# Patient Record
Sex: FEMALE | Race: WHITE | NOT HISPANIC OR LATINO | Employment: OTHER | URBAN - METROPOLITAN AREA
[De-identification: names, ages, dates, MRNs, and addresses within clinical notes are randomized per-mention and may not be internally consistent; named-entity substitution may affect disease eponyms.]

---

## 2018-12-17 ENCOUNTER — OFFICE VISIT (OUTPATIENT)
Dept: FAMILY MEDICINE | Facility: CLINIC | Age: 78
End: 2018-12-17
Payer: MEDICARE

## 2018-12-17 VITALS
DIASTOLIC BLOOD PRESSURE: 74 MMHG | HEART RATE: 74 BPM | BODY MASS INDEX: 23.16 KG/M2 | OXYGEN SATURATION: 99 % | TEMPERATURE: 99 F | WEIGHT: 118 LBS | SYSTOLIC BLOOD PRESSURE: 143 MMHG | HEIGHT: 60 IN

## 2018-12-17 DIAGNOSIS — E11.9 DIABETES MELLITUS WITHOUT COMPLICATION: Primary | ICD-10-CM

## 2018-12-17 DIAGNOSIS — E03.9 HYPOTHYROIDISM (ACQUIRED): ICD-10-CM

## 2018-12-17 DIAGNOSIS — F32.A DEPRESSION, UNSPECIFIED DEPRESSION TYPE: ICD-10-CM

## 2018-12-17 PROCEDURE — 99214 OFFICE O/P EST MOD 30 MIN: CPT | Mod: S$GLB,,, | Performed by: NURSE PRACTITIONER

## 2018-12-17 RX ORDER — METFORMIN HYDROCHLORIDE 500 MG/1
500 TABLET ORAL 2 TIMES DAILY WITH MEALS
COMMUNITY
End: 2018-12-17

## 2018-12-17 RX ORDER — ALPRAZOLAM 0.5 MG/1
0.5 TABLET ORAL 3 TIMES DAILY
COMMUNITY
End: 2018-12-17 | Stop reason: SDUPTHER

## 2018-12-17 RX ORDER — ESTRADIOL 0.1 MG/G
1 CREAM VAGINAL DAILY
Qty: 46 G | Refills: 3 | Status: SHIPPED | OUTPATIENT
Start: 2018-12-17 | End: 2019-03-18 | Stop reason: SDUPTHER

## 2018-12-17 RX ORDER — INSULIN GLARGINE 100 [IU]/ML
10 INJECTION, SOLUTION SUBCUTANEOUS NIGHTLY
Qty: 3 SYRINGE | Refills: 5 | Status: SHIPPED | OUTPATIENT
Start: 2018-12-17 | End: 2019-03-22

## 2018-12-17 RX ORDER — LEVOTHYROXINE SODIUM 75 UG/1
75 TABLET ORAL DAILY
Qty: 30 TABLET | Refills: 5 | Status: SHIPPED | OUTPATIENT
Start: 2018-12-17 | End: 2019-03-19

## 2018-12-17 RX ORDER — GLIMEPIRIDE 2 MG/1
2 TABLET ORAL
COMMUNITY
End: 2018-12-17

## 2018-12-17 RX ORDER — ESTRADIOL 0.1 MG/G
CREAM VAGINAL DAILY
COMMUNITY
End: 2018-12-17 | Stop reason: SDUPTHER

## 2018-12-17 RX ORDER — IMIPRAMINE HYDROCHLORIDE 50 MG/1
50 TABLET, FILM COATED ORAL NIGHTLY
Qty: 30 TABLET | Refills: 5 | Status: SHIPPED | OUTPATIENT
Start: 2018-12-17 | End: 2019-03-18 | Stop reason: SDUPTHER

## 2018-12-17 RX ORDER — IMIPRAMINE HYDROCHLORIDE 50 MG/1
50 TABLET, FILM COATED ORAL NIGHTLY
COMMUNITY
End: 2018-12-17 | Stop reason: SDUPTHER

## 2018-12-17 RX ORDER — ASPIRIN 325 MG
325 TABLET ORAL DAILY
COMMUNITY
End: 2020-11-13

## 2018-12-17 RX ORDER — ATENOLOL 50 MG/1
50 TABLET ORAL DAILY
COMMUNITY
End: 2018-12-17 | Stop reason: SDUPTHER

## 2018-12-17 RX ORDER — ATENOLOL 50 MG/1
50 TABLET ORAL DAILY
Qty: 30 TABLET | Refills: 5 | Status: SHIPPED | OUTPATIENT
Start: 2018-12-17 | End: 2019-03-18

## 2018-12-17 RX ORDER — ALPRAZOLAM 0.5 MG/1
0.5 TABLET ORAL 3 TIMES DAILY
Qty: 90 TABLET | Refills: 2 | Status: SHIPPED | OUTPATIENT
Start: 2018-12-17 | End: 2019-03-18 | Stop reason: SDUPTHER

## 2018-12-17 RX ORDER — LEVOTHYROXINE SODIUM 75 UG/1
75 TABLET ORAL DAILY
COMMUNITY
End: 2018-12-17 | Stop reason: SDUPTHER

## 2018-12-17 NOTE — PROGRESS NOTES
Subjective:       Patient ID: Maliha Jaramillo is a 77 y.o. female.    Chief Complaint: Medication Problem (pt c/o severe symptoms after medication change from Januvia to Glimepiride); Joint Swelling; and Diabetes (blood sugar 271 at 1:54 p.m.)    76 y/o female known to me presents for refills. She was seen by Cardiology for near syncopal events, it appears she may just be orthostatic.  She takes 1/2 atenolol in the morning and the other 1/2 in the afternoon if needed and reports feeling better. She will follow up with Dr. Valdivia in May.  She looks good today. She reports having Hgb A1c 2 weeks ago with a result of 8%. She is intolerant to a lot of medications other than Januvia yet the cost of the medication is too high thus will try Lantus. She has been on xanax for 20 years for anxiety, she reports anxiety still controlled with current regimen.        Review of Systems   Constitutional: Negative for chills, diaphoresis, fever and unexpected weight change.   HENT: Negative for dental problem and trouble swallowing.    Eyes: Negative for visual disturbance.   Respiratory: Negative for shortness of breath and wheezing.    Cardiovascular: Negative for chest pain and palpitations.   Gastrointestinal: Negative for abdominal pain, constipation, diarrhea, nausea and vomiting.   Endocrine: Negative for polydipsia and polyuria.   Genitourinary: Negative for dysuria.   Musculoskeletal: Negative for joint swelling.   Skin: Negative for rash.   Neurological: Negative for syncope and headaches.   Psychiatric/Behavioral: Negative for agitation and confusion.       Objective:      Physical Exam   Constitutional: She is oriented to person, place, and time. She appears well-developed and well-nourished.   HENT:   Head: Normocephalic.   Eyes: Pupils are equal, round, and reactive to light.   Neck: Normal range of motion. Neck supple.   Cardiovascular: Normal rate, regular rhythm and normal heart sounds.   Pulmonary/Chest: Effort  normal and breath sounds normal.   Abdominal: Soft. Bowel sounds are normal.   Musculoskeletal: Normal range of motion.   Neurological: She is alert and oriented to person, place, and time.   Skin: Skin is warm and dry. Capillary refill takes less than 2 seconds.   Psychiatric: She has a normal mood and affect. Judgment and thought content normal.   Vitals reviewed.      Assessment:       1. Diabetes mellitus without complication    2. Depression, unspecified depression type        Plan:       1- xanax filled for 3 months  2- all other medications refilled for 30 days with refills.  3- RTC in 3 months.  4- [facility] for records from Dr. Tellez office signed.  5- start lantus at 10 units every night

## 2018-12-20 ENCOUNTER — NURSE TRIAGE (OUTPATIENT)
Dept: ADMINISTRATIVE | Facility: CLINIC | Age: 78
End: 2018-12-20

## 2018-12-20 NOTE — TELEPHONE ENCOUNTER
Reason for Disposition   Systolic BP < 90 and NOT dizzy, lightheaded or weak    Protocols used: ST LOW BLOOD PRESSURE-A-OH    Pt and Son calling with concerns of low blood pressure reading of 92/79 and Heart Rate of 99.  Pt states as long as she is lying down, no dizziness or lightheadedness.  Blood glucose was 275 and now 153.  Care advice given.

## 2019-01-13 ENCOUNTER — PATIENT MESSAGE (OUTPATIENT)
Dept: FAMILY MEDICINE | Facility: CLINIC | Age: 79
End: 2019-01-13

## 2019-01-14 RX ORDER — AZITHROMYCIN 250 MG/1
TABLET, FILM COATED ORAL
Qty: 6 TABLET | Refills: 0 | Status: SHIPPED | OUTPATIENT
Start: 2019-01-14 | End: 2019-01-19

## 2019-01-16 ENCOUNTER — PATIENT MESSAGE (OUTPATIENT)
Dept: FAMILY MEDICINE | Facility: CLINIC | Age: 79
End: 2019-01-16

## 2019-02-22 ENCOUNTER — TELEPHONE (OUTPATIENT)
Dept: FAMILY MEDICINE | Facility: CLINIC | Age: 79
End: 2019-02-22

## 2019-02-22 NOTE — TELEPHONE ENCOUNTER
Pt scheduled with NP on Monday.      ----- Message from Sussy Fontanez sent at 2/22/2019  3:51 PM CST -----  Contact: patient  Type:  Sooner Apoointment Request    Caller is requesting a sooner appointment.  Caller declined first available appointment listed below.  Caller will not accept being placed on the waitlist and is requesting a message be sent to doctor.    Name of Caller:  patient  When is the first available appointment?  03/12/19  Symptoms:  Mucus cough  Best Call Back Number:  [phone]  Additional Information:  Requesting a call back would like be seen on 02/25/19,patient stated that she just got over pneumonia still have a wheezing cough

## 2019-02-25 ENCOUNTER — OFFICE VISIT (OUTPATIENT)
Dept: FAMILY MEDICINE | Facility: CLINIC | Age: 79
End: 2019-02-25
Payer: MEDICARE

## 2019-02-25 VITALS
WEIGHT: 111.75 LBS | SYSTOLIC BLOOD PRESSURE: 158 MMHG | HEIGHT: 60 IN | HEART RATE: 76 BPM | DIASTOLIC BLOOD PRESSURE: 81 MMHG | RESPIRATION RATE: 18 BRPM | BODY MASS INDEX: 21.94 KG/M2 | OXYGEN SATURATION: 99 % | TEMPERATURE: 98 F

## 2019-02-25 DIAGNOSIS — J18.9 PNEUMONIA OF RIGHT LOWER LOBE DUE TO INFECTIOUS ORGANISM: Primary | ICD-10-CM

## 2019-02-25 DIAGNOSIS — Z12.31 ENCOUNTER FOR SCREENING MAMMOGRAM FOR MALIGNANT NEOPLASM OF BREAST: ICD-10-CM

## 2019-02-25 PROCEDURE — 99213 PR OFFICE/OUTPT VISIT, EST, LEVL III, 20-29 MIN: ICD-10-PCS | Mod: S$GLB,,, | Performed by: NURSE PRACTITIONER

## 2019-02-25 PROCEDURE — 99213 OFFICE O/P EST LOW 20 MIN: CPT | Mod: S$GLB,,, | Performed by: NURSE PRACTITIONER

## 2019-02-25 RX ORDER — DOXYCYCLINE HYCLATE 100 MG
100 TABLET ORAL EVERY 12 HOURS
Qty: 20 TABLET | Refills: 0 | Status: SHIPPED | OUTPATIENT
Start: 2019-02-25 | End: 2019-03-07

## 2019-02-26 NOTE — PROGRESS NOTES
"Chief Complaint  Chief Complaint   Patient presents with    Chest Congestion    Cough       HPI:  Maliha Jaramillo is a 78 y.o. female with medical diagnoses as listed and reviewed within the medical history and problem list that presents for complaints of cough and chest congestion. She denies fever or chills. She denies SOB or chest pain. She states, "I just feel like everything is more work than it needs to be and making me exhausted". Pt is reporting increased fatigue.     PAST MEDICAL HISTORY:  Past Medical History:   Diagnosis Date    Anxiety     Arthritis     Hyperlipidemia     Thyroid disease        PAST SURGICAL HISTORY:  Past Surgical History:   Procedure Laterality Date    APPENDECTOMY      HYSTERECTOMY      TONSILLECTOMY         SOCIAL HISTORY:  Social History     Socioeconomic History    Marital status:      Spouse name: Not on file    Number of children: Not on file    Years of education: Not on file    Highest education level: Not on file   Social Needs    Financial resource strain: Not on file    Food insecurity - worry: Not on file    Food insecurity - inability: Not on file    Transportation needs - medical: Not on file    Transportation needs - non-medical: Not on file   Occupational History    Not on file   Tobacco Use    Smoking status: Former Smoker    Smokeless tobacco: Never Used   Substance and Sexual Activity    Alcohol use: Yes    Drug use: No    Sexual activity: No   Other Topics Concern    Not on file   Social History Narrative    Not on file       FAMILY HISTORY:  History reviewed. No pertinent family history.    ALLERGIES AND MEDICATIONS: updated and reviewed.  Review of patient's allergies indicates:   Allergen Reactions    Ciprofloxacin Rash     Current Outpatient Medications   Medication Sig Dispense Refill    ALPRAZolam (XANAX) 0.5 MG tablet Take 1 tablet (0.5 mg total) by mouth 3 (three) times daily. 90 tablet 2    aspirin 325 MG tablet Take " 325 mg by mouth once daily.      atenolol (TENORMIN) 50 MG tablet Take 1 tablet (50 mg total) by mouth once daily. 30 tablet 5    doxycycline (VIBRA-TABS) 100 MG tablet Take 1 tablet (100 mg total) by mouth every 12 (twelve) hours. for 10 days 20 tablet 0    estradiol (ESTRACE) 0.01 % (0.1 mg/gram) vaginal cream Place 1 g vaginally once daily. 46 g 3    imipramine (TOFRANIL) 50 MG tablet Take 1 tablet (50 mg total) by mouth every evening. 30 tablet 5    insulin (LANTUS SOLOSTAR U-100 INSULIN) glargine 100 units/mL (3mL) SubQ pen Inject 10 Units into the skin every evening. 3 Syringe 5    levothyroxine (SYNTHROID) 75 MCG tablet Take 1 tablet (75 mcg total) by mouth once daily. 30 tablet 5    SITagliptin (JANUVIA) 100 MG Tab Take 100 mg by mouth once daily.       No current facility-administered medications for this visit.          ROS  Review of Systems   Constitutional: Positive for fatigue. Negative for appetite change, chills and fever.   HENT: Positive for congestion, rhinorrhea and sore throat. Negative for postnasal drip.    Respiratory: Positive for cough and wheezing. Negative for chest tightness and shortness of breath.    Cardiovascular: Negative for chest pain and palpitations.   Gastrointestinal: Negative for abdominal distention, abdominal pain, constipation, diarrhea, nausea and vomiting.   Genitourinary: Negative for dysuria.   Musculoskeletal: Negative for myalgias.   Skin: Negative for color change and rash.   Neurological: Positive for weakness. Negative for dizziness and headaches.   Psychiatric/Behavioral: Negative for confusion and sleep disturbance. The patient is not nervous/anxious.            PHYSICAL EXAM  Vitals:    02/25/19 1503   BP: (!) 158/81   BP Location: Right arm   Patient Position: Sitting   Pulse: 76   Resp: 18   Temp: 97.9 °F (36.6 °C)   SpO2: 99%   Weight: 50.7 kg (111 lb 12.4 oz)   Height: 5' (1.524 m)    Body mass index is 21.83 kg/m².  Weight: 50.7 kg (111 lb 12.4 oz)    Height: 5' (152.4 cm)       Physical Exam   Constitutional: She is oriented to person, place, and time. She appears well-developed and well-nourished.   HENT:   Head: Normocephalic.   Right Ear: No drainage, swelling or tenderness. Tympanic membrane is not erythematous and not bulging.   Left Ear: No drainage, swelling or tenderness. Tympanic membrane is not erythematous and not bulging.   Nose: Rhinorrhea present. No mucosal edema. Right sinus exhibits no maxillary sinus tenderness and no frontal sinus tenderness. Left sinus exhibits no maxillary sinus tenderness and no frontal sinus tenderness.   Mouth/Throat: Mucous membranes are normal. No oropharyngeal exudate, posterior oropharyngeal edema or posterior oropharyngeal erythema.   Eyes: Pupils are equal, round, and reactive to light.   Neck: Normal range of motion. Neck supple.   Cardiovascular: Normal rate, regular rhythm, S1 normal and S2 normal.   No murmur heard.  Pulmonary/Chest: Effort normal. No respiratory distress. She has no wheezes. She has rhonchi in the right lower field.   Abdominal: Soft. Normal appearance and bowel sounds are normal. She exhibits no distension. There is no tenderness.   Musculoskeletal: Normal range of motion.   Pt walks with a walker.    Neurological: She is alert and oriented to person, place, and time.   Skin: Skin is warm and dry. Capillary refill takes less than 2 seconds.   Psychiatric: She has a normal mood and affect. Her speech is normal and behavior is normal. Thought content normal. Cognition and memory are normal.   Vitals reviewed.        Health Maintenance       Date Due Completion Date    Lipid Panel 1938 ---    Hemoglobin A1c 1938 ---    Foot Exam 12/27/1948 ---    Eye Exam 12/27/1948 ---    Urine Microalbumin 12/27/1948 ---    TETANUS VACCINE 12/27/1956 ---    DEXA SCAN 12/27/1978 ---    Zoster Vaccine 12/27/1998 ---    Pneumococcal Vaccine (65+ Low/Medium Risk) (1 of 2 - PCV13) 12/27/2003 ---     Influenza Vaccine 08/01/2018 ---               Assessment & Plan    [place] was seen today for chest congestion and cough.    Diagnoses and all orders for this visit:    Pneumonia of right lower lobe due to infectious organism  -     doxycycline (VIBRA-TABS) 100 MG tablet; Take 1 tablet (100 mg total) by mouth every 12 (twelve) hours. for 10 days    Encounter for screening mammogram for malignant neoplasm of breast   -     Mammo Digital Screening Bilateral With CAD; Future    - Increase PO fluid intake.  - Tylenol and Motrin as needed for fever or body aches.   -     Follow-up: Follow-up in about 4 weeks (around 3/25/2019).      Risks, benefits, and side effects were discussed with the patient. All questions were answered to the fullest satisfaction of the patient, and pt verbalized understanding and agreement to treatment plan. Pt was to call with any new or worsening symptoms, or present to the ER.

## 2019-03-08 ENCOUNTER — TELEPHONE (OUTPATIENT)
Dept: FAMILY MEDICINE | Facility: CLINIC | Age: 79
End: 2019-03-08

## 2019-03-08 NOTE — TELEPHONE ENCOUNTER
Attempted to return call to pt, no answer, left message to call office.          ----- Message from Klarissa May sent at 3/8/2019  1:09 PM CST -----  Contact: self  Type:  Patient Returning Call    Who Called:  Patient   Who Left Message for Patient:nurse   Does the patient know what this is regarding?:    Best Call Back Number:[phone] (home)     Additional Information:

## 2019-03-11 ENCOUNTER — HOSPITAL ENCOUNTER (OUTPATIENT)
Dept: RADIOLOGY | Facility: HOSPITAL | Age: 79
Discharge: HOME | End: 2019-03-11
Attending: NURSE PRACTITIONER
Payer: MEDICARE

## 2019-03-11 VITALS — BODY MASS INDEX: 21.4 KG/M2 | HEIGHT: 60 IN | WEIGHT: 109 LBS

## 2019-03-11 DIAGNOSIS — Z12.31 ENCOUNTER FOR SCREENING MAMMOGRAM FOR MALIGNANT NEOPLASM OF BREAST: ICD-10-CM

## 2019-03-11 PROCEDURE — 77067 SCR MAMMO BI INCL CAD: CPT | Mod: TC

## 2019-03-11 PROCEDURE — 77067 MAMMO DIGITAL SCREENING BILAT WITH CAD: ICD-10-PCS | Mod: 26,,, | Performed by: RADIOLOGY

## 2019-03-11 PROCEDURE — 77067 SCR MAMMO BI INCL CAD: CPT | Mod: 26,,, | Performed by: RADIOLOGY

## 2019-03-18 ENCOUNTER — LAB VISIT (OUTPATIENT)
Dept: LAB | Facility: HOSPITAL | Age: 79
End: 2019-03-18
Attending: NURSE PRACTITIONER
Payer: MEDICARE

## 2019-03-18 ENCOUNTER — OFFICE VISIT (OUTPATIENT)
Dept: FAMILY MEDICINE | Facility: CLINIC | Age: 79
End: 2019-03-18
Payer: MEDICARE

## 2019-03-18 VITALS
HEART RATE: 74 BPM | BODY MASS INDEX: 22.38 KG/M2 | DIASTOLIC BLOOD PRESSURE: 76 MMHG | HEIGHT: 60 IN | WEIGHT: 114 LBS | TEMPERATURE: 98 F | SYSTOLIC BLOOD PRESSURE: 155 MMHG

## 2019-03-18 DIAGNOSIS — R53.1 WEAKNESS: ICD-10-CM

## 2019-03-18 DIAGNOSIS — E11.9 DIABETES MELLITUS WITHOUT COMPLICATION: ICD-10-CM

## 2019-03-18 DIAGNOSIS — Z79.899 HIGH RISK MEDICATION USE: ICD-10-CM

## 2019-03-18 DIAGNOSIS — F41.9 ANXIETY: ICD-10-CM

## 2019-03-18 DIAGNOSIS — I10 HYPERTENSION, UNSPECIFIED TYPE: ICD-10-CM

## 2019-03-18 DIAGNOSIS — E03.9 HYPOTHYROIDISM (ACQUIRED): Primary | ICD-10-CM

## 2019-03-18 DIAGNOSIS — E03.9 HYPOTHYROIDISM (ACQUIRED): ICD-10-CM

## 2019-03-18 LAB
ALBUMIN SERPL BCP-MCNC: 3.6 G/DL
ALP SERPL-CCNC: 70 U/L
ALT SERPL W/O P-5'-P-CCNC: 27 U/L
ANION GAP SERPL CALC-SCNC: 12 MMOL/L
AST SERPL-CCNC: 41 U/L
BASOPHILS # BLD AUTO: 0.09 K/UL
BASOPHILS NFR BLD: 1 %
BILIRUB SERPL-MCNC: 0.4 MG/DL
BUN SERPL-MCNC: 18 MG/DL
CALCIUM SERPL-MCNC: 8.8 MG/DL
CHLORIDE SERPL-SCNC: 102 MMOL/L
CHOLEST SERPL-MCNC: 216 MG/DL
CHOLEST/HDLC SERPL: 6.2 {RATIO}
CO2 SERPL-SCNC: 23 MMOL/L
CREAT SERPL-MCNC: 1.2 MG/DL
DIFFERENTIAL METHOD: ABNORMAL
EOSINOPHIL # BLD AUTO: 0.6 K/UL
EOSINOPHIL NFR BLD: 5.9 %
ERYTHROCYTE [DISTWIDTH] IN BLOOD BY AUTOMATED COUNT: 12.3 %
EST. GFR  (AFRICAN AMERICAN): 49.3 ML/MIN/1.73 M^2
EST. GFR  (NON AFRICAN AMERICAN): 42.8 ML/MIN/1.73 M^2
ESTIMATED AVG GLUCOSE: 169 MG/DL
GLUCOSE SERPL-MCNC: 150 MG/DL
HBA1C MFR BLD HPLC: 7.5 %
HCT VFR BLD AUTO: 32.8 %
HDLC SERPL-MCNC: 35 MG/DL
HDLC SERPL: 16.2 %
HGB BLD-MCNC: 10.8 G/DL
IMM GRANULOCYTES # BLD AUTO: 0.03 K/UL
IMM GRANULOCYTES NFR BLD AUTO: 0.3 %
LDLC SERPL CALC-MCNC: 140.8 MG/DL
LYMPHOCYTES # BLD AUTO: 4.6 K/UL
LYMPHOCYTES NFR BLD: 49.6 %
MCH RBC QN AUTO: 32 PG
MCHC RBC AUTO-ENTMCNC: 32.9 G/DL
MCV RBC AUTO: 97 FL
MONOCYTES # BLD AUTO: 0.7 K/UL
MONOCYTES NFR BLD: 7.3 %
NEUTROPHILS # BLD AUTO: 3.3 K/UL
NEUTROPHILS NFR BLD: 35.9 %
NONHDLC SERPL-MCNC: 181 MG/DL
NRBC BLD-RTO: 0 /100 WBC
PLATELET # BLD AUTO: 255 K/UL
PMV BLD AUTO: 10.1 FL
POTASSIUM SERPL-SCNC: 4 MMOL/L
PROT SERPL-MCNC: 7.2 G/DL
RBC # BLD AUTO: 3.38 M/UL
SODIUM SERPL-SCNC: 137 MMOL/L
T4 FREE SERPL-MCNC: 1.27 NG/DL
TRIGL SERPL-MCNC: 201 MG/DL
TSH SERPL DL<=0.005 MIU/L-ACNC: 0.22 UIU/ML
WBC # BLD AUTO: 9.28 K/UL

## 2019-03-18 PROCEDURE — 80061 LIPID PANEL: CPT

## 2019-03-18 PROCEDURE — 36415 COLL VENOUS BLD VENIPUNCTURE: CPT

## 2019-03-18 PROCEDURE — 99214 PR OFFICE/OUTPT VISIT, EST, LEVL IV, 30-39 MIN: ICD-10-PCS | Mod: S$GLB,,, | Performed by: NURSE PRACTITIONER

## 2019-03-18 PROCEDURE — 84439 ASSAY OF FREE THYROXINE: CPT

## 2019-03-18 PROCEDURE — 84443 ASSAY THYROID STIM HORMONE: CPT

## 2019-03-18 PROCEDURE — 99214 OFFICE O/P EST MOD 30 MIN: CPT | Mod: S$GLB,,, | Performed by: NURSE PRACTITIONER

## 2019-03-18 PROCEDURE — 83036 HEMOGLOBIN GLYCOSYLATED A1C: CPT

## 2019-03-18 PROCEDURE — 85025 COMPLETE CBC W/AUTO DIFF WBC: CPT

## 2019-03-18 PROCEDURE — 80053 COMPREHEN METABOLIC PANEL: CPT

## 2019-03-18 RX ORDER — ESTRADIOL 0.1 MG/G
1 CREAM VAGINAL DAILY
Qty: 46 G | Refills: 3 | Status: SHIPPED | OUTPATIENT
Start: 2019-03-18 | End: 2019-11-26

## 2019-03-18 RX ORDER — IMIPRAMINE HYDROCHLORIDE 50 MG/1
50 TABLET, FILM COATED ORAL NIGHTLY
Qty: 30 TABLET | Refills: 5 | Status: SHIPPED | OUTPATIENT
Start: 2019-03-18 | End: 2019-07-03 | Stop reason: SDUPTHER

## 2019-03-18 RX ORDER — ATENOLOL 25 MG/1
25 TABLET ORAL DAILY
Qty: 180 TABLET | Refills: 1 | Status: SHIPPED | OUTPATIENT
Start: 2019-03-18 | End: 2020-03-17

## 2019-03-18 RX ORDER — ALPRAZOLAM 0.5 MG/1
0.5 TABLET ORAL 2 TIMES DAILY PRN
Qty: 60 TABLET | Refills: 2 | Status: SHIPPED | OUTPATIENT
Start: 2019-03-18 | End: 2019-07-03 | Stop reason: SDUPTHER

## 2019-03-18 NOTE — PROGRESS NOTES
Subjective:       Patient ID: Maliha Jaramillo is a 78 y.o. female.    Chief Complaint: Follow-up    77 y/o female with PMH: DM, HTN and hypothyroidism presents for 3 mo follow up.  She reports doing essentially well. She has a taking Januvia paying out of pocket $300.00 or more, intolerant to metformin started on Lantus 12/2018 d/t cost and Hgb A1c > 8.5%, januvia stopped. She is under the care of Dr. Valdivia orthostatic hypotension, significantly improved from 2 years ago with next appt 5/7/19. Although she does express recurrent chest heaviness, near syncopal and period of weakness.  She is somewhat frail walking with a rollator. She lives with her son reporting she stopped driving ot driving 6 months and has accepted it.  She continues to maciel with anxiety reporting essentially controlled with 0.5 mg xanax taken twice daily on most days. It was discussed it is not recommend to take xanax to sleep, it is intended for PRN Anxiety. She is due for labs.     Review of Systems   Constitutional: Negative for chills, diaphoresis, fever and unexpected weight change.   HENT: Negative for dental problem and trouble swallowing.    Eyes: Negative for visual disturbance.   Respiratory: Positive for chest tightness. Negative for shortness of breath and wheezing.         Chest heaviness    Cardiovascular: Negative for chest pain and palpitations.   Gastrointestinal: Negative for abdominal pain, constipation, diarrhea, nausea and vomiting.   Endocrine: Negative for polydipsia and polyuria.   Genitourinary: Negative for dysuria.   Musculoskeletal: Negative for joint swelling.   Skin: Negative for rash.   Neurological: Positive for weakness and light-headedness. Negative for syncope and headaches.        Near syncope couple times a month   Psychiatric/Behavioral: Negative for agitation and confusion.       Objective:      Physical Exam   Constitutional: She is oriented to person, place, and time. She appears well-developed and  well-nourished.   HENT:   Head: Normocephalic.   Eyes: Conjunctivae are normal. Pupils are equal, round, and reactive to light.   Neck: Normal range of motion. Neck supple. No thyromegaly present.   Cardiovascular: Normal rate, regular rhythm and normal heart sounds.   No murmur heard.  Pulmonary/Chest: Effort normal and breath sounds normal. She has no wheezes. She has no rales.   Abdominal: Soft. Bowel sounds are normal. She exhibits no distension. There is no tenderness.   Musculoskeletal: Normal range of motion. She exhibits no edema.   Neurological: She is alert and oriented to person, place, and time.   Skin: Skin is warm and dry. Capillary refill takes less than 2 seconds. No rash noted.   Psychiatric: She has a normal mood and affect. Judgment and thought content normal.   Vitals reviewed.      Assessment:       1. Hypothyroidism (acquired)    2. Diabetes mellitus without complication    3. Weakness    4. Hypertension, unspecified type    5. High risk medication use    6. Anxiety        Plan:       1- fasting labs today  2- xanax #60 printed with 3RF  3- refills to be sent after labs result.  4- note and labs to be faxed to Dr. Valdivia  5- encouraged to call Dr. Valdivia for sooner appt and go to ER as needed.

## 2019-03-19 ENCOUNTER — TELEPHONE (OUTPATIENT)
Dept: FAMILY MEDICINE | Facility: CLINIC | Age: 79
End: 2019-03-19

## 2019-03-19 DIAGNOSIS — E11.69 HYPERLIPIDEMIA ASSOCIATED WITH TYPE 2 DIABETES MELLITUS: Primary | ICD-10-CM

## 2019-03-19 DIAGNOSIS — E78.5 HYPERLIPIDEMIA ASSOCIATED WITH TYPE 2 DIABETES MELLITUS: Primary | ICD-10-CM

## 2019-03-19 DIAGNOSIS — E03.9 HYPOTHYROIDISM (ACQUIRED): ICD-10-CM

## 2019-03-19 RX ORDER — PRAVASTATIN SODIUM 10 MG/1
10 TABLET ORAL DAILY
Qty: 90 TABLET | Refills: 0 | Status: SHIPPED | OUTPATIENT
Start: 2019-03-19 | End: 2019-06-30 | Stop reason: SDUPTHER

## 2019-03-19 RX ORDER — LEVOTHYROXINE SODIUM 50 UG/1
50 TABLET ORAL DAILY
Qty: 90 TABLET | Refills: 0 | Status: SHIPPED | OUTPATIENT
Start: 2019-03-19 | End: 2019-07-09

## 2019-03-22 ENCOUNTER — TELEPHONE (OUTPATIENT)
Dept: FAMILY MEDICINE | Facility: CLINIC | Age: 79
End: 2019-03-22

## 2019-03-22 DIAGNOSIS — E11.9 DIABETES MELLITUS WITHOUT COMPLICATION: Primary | ICD-10-CM

## 2019-03-22 RX ORDER — INSULIN GLARGINE 100 [IU]/ML
15 INJECTION, SOLUTION SUBCUTANEOUS NIGHTLY
Qty: 13.5 ML | Refills: 1 | Status: SHIPPED | OUTPATIENT
Start: 2019-03-22 | End: 2019-07-03

## 2019-03-22 NOTE — TELEPHONE ENCOUNTER
Attempted to contact patient. Patient did not answer, I was unable to leave a voicemail at this time. Patient scheduled on 6/19/19 at 1320.

## 2019-03-22 NOTE — TELEPHONE ENCOUNTER
----- Message from Adela Cortez sent at 3/22/2019  7:12 AM CDT -----  Contact: Fangxinmeigisselle  Message from [facility], System Message sent at 3/20/2019  5:33 PM CDT -----    Appointment Request From: Maliha Jaramillo    With Provider: Gladis Mirza NP [[facility]]    Preferred Date Range: 6/18/2019 - 6/19/2019    Preferred Times: Tuesday Afternoon, Wednesday Afternoon    Reason for visit: 3 month follow up    Comments:  three month follow up

## 2019-03-22 NOTE — TELEPHONE ENCOUNTER
----- Message from Alberto Nice sent at 3/22/2019  8:19 AM CDT -----  Type:  Patient Returning Call    Who Called:  Patient  Who Left Message for Patient:  Yaima  Does the patient know what this is regarding?:  Test results  Best Call Back Number: [phone]   Additional Information:

## 2019-04-26 ENCOUNTER — PATIENT MESSAGE (OUTPATIENT)
Dept: FAMILY MEDICINE | Facility: CLINIC | Age: 79
End: 2019-04-26

## 2019-04-29 ENCOUNTER — TELEPHONE (OUTPATIENT)
Dept: FAMILY MEDICINE | Facility: CLINIC | Age: 79
End: 2019-04-29

## 2019-04-29 DIAGNOSIS — E11.9 DIABETES MELLITUS WITHOUT COMPLICATION: Primary | ICD-10-CM

## 2019-04-29 RX ORDER — GLIPIZIDE 10 MG/1
10 TABLET ORAL
Qty: 60 TABLET | Refills: 2 | Status: SHIPPED | OUTPATIENT
Start: 2019-04-29 | End: 2019-08-03 | Stop reason: SDUPTHER

## 2019-05-06 DIAGNOSIS — F41.9 ANXIETY: ICD-10-CM

## 2019-05-06 RX ORDER — ALPRAZOLAM 0.5 MG/1
0.5 TABLET ORAL 2 TIMES DAILY PRN
Qty: 60 TABLET | Refills: 2 | Status: CANCELLED | OUTPATIENT
Start: 2019-05-06

## 2019-05-06 NOTE — TELEPHONE ENCOUNTER
Please notify Ms. Diaz that on 3/18/19 Janine wrote for #60 and gave 2 refills. She can check with the pharmacy.

## 2019-05-07 ENCOUNTER — TELEPHONE (OUTPATIENT)
Dept: FAMILY MEDICINE | Facility: CLINIC | Age: 79
End: 2019-05-07

## 2019-05-07 NOTE — TELEPHONE ENCOUNTER
++@[phone] Spoke with pt. Informed of NP communication. Verbalized an understanding.     + @[phone] Attempted to call pt. NURYS x1

## 2019-05-07 NOTE — TELEPHONE ENCOUNTER
----- Message from Mary Lopez sent at 3/13/2019  1:35 PM CDT -----  Contact: PT Portal Request  Appointment Request From: Maliha Jaramillo    With Provider: [facility]    Preferred Date Range: 3/12/2019 - 3/15/2019    Preferred Times: Any time    Reason for visit: mammogram    Comments:  I have an order for a mammogram from [facility] for a mammogram.

## 2019-05-07 NOTE — TELEPHONE ENCOUNTER
See other encounter regarding this message.     ----- Message from Denisse Ferguson sent at 5/7/2019 10:51 AM CDT -----  Contact: [phone]  Patient is returning nurse's phone call.  Please call patient back at [phone].

## 2019-06-19 ENCOUNTER — PATIENT OUTREACH (OUTPATIENT)
Dept: ADMINISTRATIVE | Facility: HOSPITAL | Age: 79
End: 2019-06-19

## 2019-06-30 DIAGNOSIS — E78.5 HYPERLIPIDEMIA ASSOCIATED WITH TYPE 2 DIABETES MELLITUS: ICD-10-CM

## 2019-06-30 DIAGNOSIS — E11.69 HYPERLIPIDEMIA ASSOCIATED WITH TYPE 2 DIABETES MELLITUS: ICD-10-CM

## 2019-07-01 RX ORDER — PRAVASTATIN SODIUM 10 MG/1
TABLET ORAL
Qty: 90 TABLET | Refills: 1 | Status: SHIPPED | OUTPATIENT
Start: 2019-07-01 | End: 2020-01-20

## 2019-07-03 ENCOUNTER — LAB VISIT (OUTPATIENT)
Dept: LAB | Facility: HOSPITAL | Age: 79
End: 2019-07-03
Attending: NURSE PRACTITIONER
Payer: MEDICARE

## 2019-07-03 ENCOUNTER — OFFICE VISIT (OUTPATIENT)
Dept: FAMILY MEDICINE | Facility: CLINIC | Age: 79
End: 2019-07-03
Payer: MEDICARE

## 2019-07-03 VITALS
HEIGHT: 60 IN | DIASTOLIC BLOOD PRESSURE: 76 MMHG | WEIGHT: 118 LBS | TEMPERATURE: 98 F | SYSTOLIC BLOOD PRESSURE: 155 MMHG | BODY MASS INDEX: 23.16 KG/M2 | HEART RATE: 71 BPM

## 2019-07-03 DIAGNOSIS — E11.40 NEUROPATHY DUE TO TYPE 2 DIABETES MELLITUS: ICD-10-CM

## 2019-07-03 DIAGNOSIS — I10 HYPERTENSION, UNSPECIFIED TYPE: ICD-10-CM

## 2019-07-03 DIAGNOSIS — E11.9 TYPE 2 DIABETES MELLITUS WITHOUT COMPLICATION, WITHOUT LONG-TERM CURRENT USE OF INSULIN: ICD-10-CM

## 2019-07-03 DIAGNOSIS — E03.9 HYPOTHYROIDISM (ACQUIRED): ICD-10-CM

## 2019-07-03 DIAGNOSIS — Z79.899 HIGH RISK MEDICATION USE: ICD-10-CM

## 2019-07-03 DIAGNOSIS — F41.9 ANXIETY: ICD-10-CM

## 2019-07-03 DIAGNOSIS — Z11.59 ENCOUNTER FOR HEPATITIS C SCREENING TEST FOR LOW RISK PATIENT: ICD-10-CM

## 2019-07-03 DIAGNOSIS — Z78.0 ASYMPTOMATIC MENOPAUSAL STATE: ICD-10-CM

## 2019-07-03 DIAGNOSIS — F32.89 OTHER DEPRESSION: ICD-10-CM

## 2019-07-03 DIAGNOSIS — E11.9 COMPREHENSIVE DIABETIC FOOT EXAMINATION, TYPE 2 DM, ENCOUNTER FOR: ICD-10-CM

## 2019-07-03 DIAGNOSIS — Z78.0 POSTMENOPAUSAL STATE: ICD-10-CM

## 2019-07-03 DIAGNOSIS — E11.9 TYPE 2 DIABETES MELLITUS WITHOUT COMPLICATION, WITHOUT LONG-TERM CURRENT USE OF INSULIN: Primary | ICD-10-CM

## 2019-07-03 LAB
25(OH)D3+25(OH)D2 SERPL-MCNC: 30 NG/ML (ref 30–96)
ANION GAP SERPL CALC-SCNC: 9 MMOL/L (ref 8–16)
BUN SERPL-MCNC: 21 MG/DL (ref 8–23)
CALCIUM SERPL-MCNC: 8.9 MG/DL (ref 8.7–10.5)
CHLORIDE SERPL-SCNC: 104 MMOL/L (ref 95–110)
CO2 SERPL-SCNC: 24 MMOL/L (ref 23–29)
CREAT SERPL-MCNC: 1.3 MG/DL (ref 0.5–1.4)
EST. GFR  (AFRICAN AMERICAN): 44.8 ML/MIN/1.73 M^2
EST. GFR  (NON AFRICAN AMERICAN): 38.8 ML/MIN/1.73 M^2
ESTIMATED AVG GLUCOSE: 169 MG/DL (ref 68–131)
GLUCOSE SERPL-MCNC: 147 MG/DL (ref 70–110)
HBA1C MFR BLD HPLC: 7.5 % (ref 4.5–6.2)
POTASSIUM SERPL-SCNC: 3.8 MMOL/L (ref 3.5–5.1)
SODIUM SERPL-SCNC: 137 MMOL/L (ref 136–145)
T4 FREE SERPL-MCNC: 0.87 NG/DL (ref 0.71–1.51)
TSH SERPL DL<=0.005 MIU/L-ACNC: 24.85 UIU/ML (ref 0.34–5.6)

## 2019-07-03 PROCEDURE — 82306 VITAMIN D 25 HYDROXY: CPT

## 2019-07-03 PROCEDURE — 36415 COLL VENOUS BLD VENIPUNCTURE: CPT

## 2019-07-03 PROCEDURE — 84439 ASSAY OF FREE THYROXINE: CPT

## 2019-07-03 PROCEDURE — 84443 ASSAY THYROID STIM HORMONE: CPT

## 2019-07-03 PROCEDURE — 99213 PR OFFICE/OUTPT VISIT, EST, LEVL III, 20-29 MIN: ICD-10-PCS | Mod: S$GLB,,, | Performed by: NURSE PRACTITIONER

## 2019-07-03 PROCEDURE — 99213 OFFICE O/P EST LOW 20 MIN: CPT | Mod: S$GLB,,, | Performed by: NURSE PRACTITIONER

## 2019-07-03 PROCEDURE — 86803 HEPATITIS C AB TEST: CPT

## 2019-07-03 PROCEDURE — 83036 HEMOGLOBIN GLYCOSYLATED A1C: CPT

## 2019-07-03 PROCEDURE — 80048 BASIC METABOLIC PNL TOTAL CA: CPT

## 2019-07-03 RX ORDER — IMIPRAMINE HYDROCHLORIDE 50 MG/1
50 TABLET, FILM COATED ORAL NIGHTLY
Qty: 90 TABLET | Refills: 1 | Status: SHIPPED | OUTPATIENT
Start: 2019-07-03 | End: 2020-05-21

## 2019-07-03 RX ORDER — PREGABALIN 25 MG/1
25 CAPSULE ORAL 2 TIMES DAILY
Qty: 60 CAPSULE | Refills: 1 | Status: SHIPPED | OUTPATIENT
Start: 2019-07-03 | End: 2019-09-10 | Stop reason: SDUPTHER

## 2019-07-03 RX ORDER — LISINOPRIL 10 MG/1
10 TABLET ORAL DAILY
Qty: 90 TABLET | Refills: 1 | Status: SHIPPED | OUTPATIENT
Start: 2019-07-03 | End: 2019-07-03

## 2019-07-03 RX ORDER — LOSARTAN POTASSIUM 25 MG/1
25 TABLET ORAL DAILY
Qty: 90 TABLET | Refills: 1 | Status: SHIPPED | OUTPATIENT
Start: 2019-07-03 | End: 2019-11-26

## 2019-07-03 RX ORDER — ALPRAZOLAM 0.5 MG/1
0.5 TABLET ORAL 2 TIMES DAILY PRN
Qty: 60 TABLET | Refills: 2 | Status: SHIPPED | OUTPATIENT
Start: 2019-07-03 | End: 2019-10-03 | Stop reason: SDUPTHER

## 2019-07-03 NOTE — PATIENT INSTRUCTIONS
1- start losartan for DM and uncontrolled HTN  2- non fasting labs  3- dexa ordered  4- request vaccine records from obtained about 5 yrs ago from [facility] in [place],  Mary Beth lamb MD  5- start lyrica at low dose for neuropathy-NEW  6- allergies updated cough with ACE  7- Xanax printed for 1 mo 2 RF            Right Bundle Branch Block    Right bundle branch block is a problem in the hearts electrical system. Your heart uses electrical signals to keep pumping normally. Normal heartbeats are generated in the upper right heart chamber called the right atrium. The electrical signals reach the ventricles, the main heart pumping chambers. They travel over specialized wires called bundle branches. There are 2 main bundle branches with one in the right ventricle and the other in the left ventricle. In right bundle branch block, the right bundle branch fails to conduct electricity. The signals from the atria reach the ventricles only through the left bundle branch. Instead of the left and right ventricles squeezing at the same time, the right ventricle squeezes just a little later.  What causes right bundle branch block?  Right bundle branch block can result from a number of conditions, such as:  · Heart disease from high blood pressure  · Chronic obstructive lung disease (COPD)  · Pulmonary embolism  · Cardiomyopathy  · Myocarditis  · Heart attack  · Congenital heart disease  · Surgery or other procedures on the heart  In some case, the cause of right bundle branch block is not known. The heart otherwise appears normal.  What are the symptoms of right bundle branch block?  Right bundle branch block does not cause symptoms on its own. It may make symptoms of other heart conditions worse, such as heart failure.  How is right bundle branch block diagnosed?  Right bundle branch block is diagnosed with an electrocardiogram (ECG). This test looks at the hearts rhythm. The condition is often found during an ECG for  another reason. Your doctor may want to check you for other health conditions. He or she may ask about your medical history and give you a physical exam. You may also have other tests, such as:  · Echocardiogram, to look at your hearts motion and blood flow through the heart  · Testing to check the health and function of your lungs  · Blood tests to look at your overall health  How is right bundle branch block treated?  If you have no symptoms and no other heart conditions, no treatment is recommended. If you have or may have heart disease, your healthcare provider will want to keep track of your heart health. In particular, if you develop right bundle branch block after a heart attack, it increases the risk of death and therefore requires more intensive treatment.  Living with right bundle branch block  Your doctor may give you more instructions about how to manage your overall heart health. You might need to make lifestyle changes. These may include losing weight, quitting smoking, or improving your diet.  Keep track of any heart symptoms you have. See your doctor regularly, even if you dont have any symptoms. Make sure all your healthcare providers know about your right bundle branch block.     When should I call my healthcare provider?  Call your healthcare provider right away if you have any of these:  · New symptoms  · Chest pain  · Fainting  · Shortness of breath   Date Last Reviewed: 5/1/2016  © 6774-3829 [employer]. [address] [place] [address], [place] [address]. All rights reserved. This information is not intended as a substitute for professional medical care. Always follow your healthcare professional's instructions.

## 2019-07-03 NOTE — PROGRESS NOTES
Subjective:       Patient ID: Maliha Jaramillo is a 78 y.o. female.    Chief Complaint: Follow-up (3 mo f/u ) and Rash  presents for routine follow up, son is present reports pt with back pain in bed 4 of 7 days last week.She presents with an EKG with incomplete RBBB requesting education thus provided, denies CP or SOB.   Taking Glipizide 3 mo tolerating well. Loves [employer] yet cost was always an issue while in the [facility] hole has paid up to $600.00 monthly.Screening discussed with pneumovac and shingle vaccines obtained about 5 yrs ago from [facility] in [place],  Mary Beth lamb MD. DM type II with Eye exam- due-Dr. Barbosa recommended. Neuropathy using OTC gtt (unknown name/brand) good pain relief when used but not consistent with using.  Requesting signature while at the  base shopping thus assistance paper signed. She is no longer driving.   Son present    Past Medical History:   Diagnosis Date    Anxiety     Arthritis     Hyperlipidemia     Thyroid disease        Past Surgical History:   Procedure Laterality Date    APPENDECTOMY      BREAST BIOPSY Right 1980's    benign    HYSTERECTOMY  1978    TONSILLECTOMY      TOTAL REDUCTION MAMMOPLASTY Bilateral         Social History     Socioeconomic History    Marital status:      Spouse name: Not on file    Number of children: Not on file    Years of education: Not on file    Highest education level: Not on file   Occupational History    Not on file   Social Needs    Financial resource strain: Not on file    Food insecurity:     Worry: Not on file     Inability: Not on file    Transportation needs:     Medical: Not on file     Non-medical: Not on file   Tobacco Use    Smoking status: Former Smoker    Smokeless tobacco: Never Used   Substance and Sexual Activity    Alcohol use: Yes    Drug use: No    Sexual activity: Never   Lifestyle    Physical activity:     Days per week: Not on file     Minutes per session: Not on file     Stress: Not on file   Relationships    Social connections:     Talks on phone: Not on file     Gets together: Not on file     Attends Yazidi service: Not on file     Active member of club or organization: Not on file     Attends meetings of clubs or organizations: Not on file     Relationship status: Not on file   Other Topics Concern    Not on file   Social History Narrative    Not on file       Family History   Problem Relation Age of Onset    Breast cancer Sister     Stroke Father        Review of patient's allergies indicates:   Allergen Reactions    Ace inhibitors Other (See Comments)     cough    Ciprofloxacin Rash          Current Outpatient Medications:     ALPRAZolam (XANAX) 0.5 MG tablet, Take 1 tablet (0.5 mg total) by mouth 2 (two) times daily as needed for Anxiety., Disp: 60 tablet, Rfl: 2    aspirin 325 MG tablet, Take 325 mg by mouth once daily., Disp: , Rfl:     atenolol (TENORMIN) 25 MG tablet, Take 1 tablet (25 mg total) by mouth once daily., Disp: 180 tablet, Rfl: 1    estradiol (ESTRACE) 0.01 % (0.1 mg/gram) vaginal cream, Place 1 g vaginally once daily., Disp: 46 g, Rfl: 3    glipiZIDE (GLUCOTROL) 10 MG tablet, Take 1 tablet (10 mg total) by mouth 2 (two) times daily before meals., Disp: 60 tablet, Rfl: 2    imipramine (TOFRANIL) 50 MG tablet, Take 1 tablet (50 mg total) by mouth every evening., Disp: 90 tablet, Rfl: 1    levothyroxine (SYNTHROID) 50 MCG tablet, Take 1 tablet (50 mcg total) by mouth once daily., Disp: 90 tablet, Rfl: 0    pravastatin (PRAVACHOL) 10 MG tablet, TAKE 1 TABLET BY MOUTH ONCE DAILY, Disp: 90 tablet, Rfl: 1    losartan (COZAAR) 25 MG tablet, Take 1 tablet (25 mg total) by mouth once daily., Disp: 90 tablet, Rfl: 1    pregabalin (LYRICA) 25 MG capsule, Take 1 capsule (25 mg total) by mouth 2 (two) times daily., Disp: 60 capsule, Rfl: 1    HPI  Review of Systems   Constitutional: Negative for chills, diaphoresis, fever and unexpected weight change.    HENT: Negative for dental problem and trouble swallowing.    Eyes: Negative for visual disturbance.   Respiratory: Negative for shortness of breath and wheezing.    Cardiovascular: Negative for chest pain and palpitations.   Gastrointestinal: Negative for abdominal pain, constipation, diarrhea, nausea and vomiting.   Endocrine: Negative for polydipsia and polyuria.   Genitourinary: Negative for dysuria.   Musculoskeletal: Negative for joint swelling.        Decrease endurance and ability to walk extended lengths.    Skin: Negative for rash.   Neurological: Negative for syncope and headaches.   Psychiatric/Behavioral: Negative for agitation and confusion.       Objective:      Physical Exam   Constitutional: She is oriented to person, place, and time. She appears well-developed and well-nourished.   HENT:   Head: Normocephalic.   Eyes: Pupils are equal, round, and reactive to light. Conjunctivae are normal.   Neck: Normal range of motion. Neck supple. No thyromegaly present.   Cardiovascular: Normal rate, regular rhythm and normal heart sounds.   Pulses:       Dorsalis pedis pulses are 1+ on the right side, and 1+ on the left side.        Posterior tibial pulses are 3+ on the right side, and 3+ on the left side.   Pulmonary/Chest: Effort normal and breath sounds normal. She has no wheezes. She has no rales.   Abdominal: Soft. Bowel sounds are normal. She exhibits no distension. There is no tenderness.   Musculoskeletal: Normal range of motion. She exhibits no edema.        Right foot: There is normal range of motion and no deformity.        Left foot: There is normal range of motion and no deformity.   Feet:   Right Foot:   Protective Sensation: 5 sites tested. 5 sites sensed.   Skin Integrity: Negative for ulcer, blister, skin breakdown, erythema, warmth, callus or dry skin.   Left Foot:   Protective Sensation: 5 sites tested. 5 sites sensed.   Skin Integrity: Negative for ulcer, blister, skin breakdown, erythema,  warmth, callus or dry skin.   Neurological: She is alert and oriented to person, place, and time.   Skin: Skin is warm and dry. Capillary refill takes less than 2 seconds.   Psychiatric: She has a normal mood and affect. Her behavior is normal. Judgment and thought content normal.   Vitals reviewed.      Assessment:       1. Type 2 diabetes mellitus without complication, without long-term current use of insulin    2. Asymptomatic menopausal state    3. Anxiety    4. Postmenopausal state    5. Encounter for hepatitis C screening test for low risk patient    6. Hypothyroidism (acquired)    7. High risk medication use    8. Hypertension, unspecified type    9. Other depression    10. Neuropathy due to type 2 diabetes mellitus    11. Comprehensive diabetic foot examination, type 2 DM, encounter for        Plan:    1- start losartan for DM and uncontrolled HTN  2- non fasting labs  3- dexa ordered  4- request vaccine records from obtained about 5 yrs ago from [facility] in [place],  Mary Beth lamb MD  5- start lyrica at low dose for neuropathy-NEW  6- allergies updated cough with ACE  7- Xanax printed for 1 mo 2 RF      Type 2 diabetes mellitus without complication, without long-term current use of insulin  -     Microalbumin/creatinine urine ratio; Future; Expected date: 12/19/2019  -     Hemoglobin A1c; Future; Expected date: 07/03/2019  -     Basic metabolic panel; Future; Expected date: 07/03/2019  -     Discontinue: lisinopril 10 MG tablet; Take 1 tablet (10 mg total) by mouth once daily.  Dispense: 90 tablet; Refill: 1    Asymptomatic menopausal state    Anxiety  -     ALPRAZolam (XANAX) 0.5 MG tablet; Take 1 tablet (0.5 mg total) by mouth 2 (two) times daily as needed for Anxiety.  Dispense: 60 tablet; Refill: 2  -     imipramine (TOFRANIL) 50 MG tablet; Take 1 tablet (50 mg total) by mouth every evening.  Dispense: 90 tablet; Refill: 1    Postmenopausal state  -     DXA Bone Density Spine And Hip; Future;  Expected date: 12/19/2019    Encounter for hepatitis C screening test for low risk patient  -     Hepatitis C antibody; Future; Expected date: 07/03/2019    Hypothyroidism (acquired)  -     TSH; Future; Expected date: 07/03/2019  -     T4, free; Future; Expected date: 07/03/2019    High risk medication use  -     Vitamin D; Future; Expected date: 07/03/2019  -     Basic metabolic panel; Future; Expected date: 07/03/2019    Hypertension, unspecified type  -     Basic metabolic panel; Future; Expected date: 07/03/2019  -     Discontinue: lisinopril 10 MG tablet; Take 1 tablet (10 mg total) by mouth once daily.  Dispense: 90 tablet; Refill: 1    Other depression  -     imipramine (TOFRANIL) 50 MG tablet; Take 1 tablet (50 mg total) by mouth every evening.  Dispense: 90 tablet; Refill: 1    Neuropathy due to type 2 diabetes mellitus  -     pregabalin (LYRICA) 25 MG capsule; Take 1 capsule (25 mg total) by mouth 2 (two) times daily.  Dispense: 60 capsule; Refill: 1    Comprehensive diabetic foot examination, type 2 DM, encounter for    Other orders  -     losartan (COZAAR) 25 MG tablet; Take 1 tablet (25 mg total) by mouth once daily.  Dispense: 90 tablet; Refill: 1        Risks, benefits, and side effects were discussed with the patient. All questions were answered to the fullest satisfaction of the patient, and pt verbalized understanding and agreement to treatment plan. Pt was to call with any new or worsening symptoms, or present to the ER.

## 2019-07-05 LAB — HCV AB SERPL QL IA: NEGATIVE

## 2019-07-06 ENCOUNTER — PATIENT MESSAGE (OUTPATIENT)
Dept: FAMILY MEDICINE | Facility: CLINIC | Age: 79
End: 2019-07-06

## 2019-07-08 ENCOUNTER — HOSPITAL ENCOUNTER (OUTPATIENT)
Dept: RADIOLOGY | Facility: HOSPITAL | Age: 79
Discharge: HOME | End: 2019-07-08
Attending: NURSE PRACTITIONER
Payer: MEDICARE

## 2019-07-08 ENCOUNTER — TELEPHONE (OUTPATIENT)
Dept: FAMILY MEDICINE | Facility: CLINIC | Age: 79
End: 2019-07-08

## 2019-07-08 ENCOUNTER — PATIENT MESSAGE (OUTPATIENT)
Dept: FAMILY MEDICINE | Facility: CLINIC | Age: 79
End: 2019-07-08

## 2019-07-08 DIAGNOSIS — Z78.0 POSTMENOPAUSAL STATE: ICD-10-CM

## 2019-07-08 DIAGNOSIS — E03.9 HYPOTHYROIDISM (ACQUIRED): Primary | ICD-10-CM

## 2019-07-08 DIAGNOSIS — M81.0 AGE-RELATED OSTEOPOROSIS WITHOUT CURRENT PATHOLOGICAL FRACTURE: ICD-10-CM

## 2019-07-08 PROCEDURE — 77080 DEXA BONE DENSITY SPINE HIP: ICD-10-PCS | Mod: 26,,, | Performed by: RADIOLOGY

## 2019-07-08 PROCEDURE — 77080 DXA BONE DENSITY AXIAL: CPT | Mod: TC

## 2019-07-08 PROCEDURE — 77080 DXA BONE DENSITY AXIAL: CPT | Mod: 26,,, | Performed by: RADIOLOGY

## 2019-07-08 NOTE — TELEPHONE ENCOUNTER
Gpt pt-Please call Ms. Jett as NP noticed after pt left she c/o rash to MA yet we did not discuss. Please ask about it.

## 2019-07-09 RX ORDER — LEVOTHYROXINE SODIUM 75 UG/1
75 TABLET ORAL DAILY
Qty: 90 TABLET | Refills: 0 | Status: SHIPPED | OUTPATIENT
Start: 2019-07-09 | End: 2020-01-02

## 2019-07-09 RX ORDER — ALENDRONATE SODIUM 70 MG/1
70 TABLET ORAL
Qty: 4 TABLET | Refills: 11 | Status: SHIPPED | OUTPATIENT
Start: 2019-07-09 | End: 2021-02-22 | Stop reason: SDUPTHER

## 2019-07-09 NOTE — TELEPHONE ENCOUNTER
Please notify pt her bone scan showed osteoporosis and I have sent fosamax to the pharmacy to be taken weekly, early am, empty stomach with full glass of water, sitting upright for 30 min.    Secondly her thyroid was very low. We did decrease her synthroid 3 mo ago yet please ask if she has been taking it.   Sent higher dose 75 mcg to pharmacy.     Hep c negative     Increase insulin again by 5 units a night.     All other labs normal.

## 2019-07-10 NOTE — TELEPHONE ENCOUNTER
I e-mailed her about the rash, and she is asking about her bone density and if she has a hip fracture?    Thank you

## 2019-08-03 DIAGNOSIS — E11.9 DIABETES MELLITUS WITHOUT COMPLICATION: ICD-10-CM

## 2019-08-04 RX ORDER — GLIPIZIDE 10 MG/1
TABLET ORAL
Qty: 60 TABLET | Refills: 2 | Status: SHIPPED | OUTPATIENT
Start: 2019-08-04 | End: 2019-10-30 | Stop reason: SDUPTHER

## 2019-09-10 ENCOUNTER — PATIENT MESSAGE (OUTPATIENT)
Dept: FAMILY MEDICINE | Facility: CLINIC | Age: 79
End: 2019-09-10

## 2019-09-10 DIAGNOSIS — E11.40 NEUROPATHY DUE TO TYPE 2 DIABETES MELLITUS: ICD-10-CM

## 2019-09-10 RX ORDER — PREGABALIN 25 MG/1
25 CAPSULE ORAL 2 TIMES DAILY
Qty: 60 CAPSULE | Refills: 1 | Status: SHIPPED | OUTPATIENT
Start: 2019-09-10 | End: 2019-12-03 | Stop reason: SDUPTHER

## 2019-09-10 NOTE — TELEPHONE ENCOUNTER
----- Message from Dinorah Yu sent at 9/10/2019  2:50 PM CDT -----    Type:  RX Refill Request    Who Called:  pt  Refill RX Name and Strength:   lyrica   How is the patient currently taking it? (ex. 1XDay):   Twice   A day  Is this a 30 day or 90 day RX:  30  day  Preferred Pharmacy with phone number:    [facility] [id number] [address]  Phone: [phone] Fax: [phone]  Best Call Back Number:[phone]  Additional Information:  Pt is  Out  Of  Her med and  Calling to  Speak to the office about  Filling  Med asa; // pt  Stated    A  Nurse  Was  Suppose  To  Get  back to pt  About    Dose // please call  For details

## 2019-09-19 ENCOUNTER — PATIENT OUTREACH (OUTPATIENT)
Dept: ADMINISTRATIVE | Facility: HOSPITAL | Age: 79
End: 2019-09-19

## 2019-09-19 NOTE — LETTER
September 30, 2019    Maliha Jaramillo  [address] [facility] [place] [address]             [facility]  [address] [facility] [address]  Phone: [phone] Dear [facility] is committed to your overall health and would like to ensure that you are up to date on your recommended test and/or procedures.   Gladis Mirza NP  has found that your chart shows you may be due for the following:     YEARLY DIABETIC EYE EXAM   TETANUS VACCINE   Shingles Vaccine(1 of 2)   Pneumococcal Vaccine (65+ Low/Medium Risk)(1 of 2 - PCV13)   Influenza Vaccine     If you have had any of the above done at another facility, please let us know so that we may obtain copies from that facility.  If you have a copy of these records, please provide a copy for us to scan into your chart.  You are welcome to request that the report be faxed to us at  ([phone]).     Otherwise, please schedule these appointments at your earliest convenience by calling [phone] or going to [email or web address].     If you have an upcoming scheduled appointment for the item above, please disregard this letter.     Sincerely,   Your [facility]   FAVIOLA Loyd L.P.N. Clinical Care Coordinator   [address] [place] [address], [place] [address] [phone]

## 2019-09-30 NOTE — PROGRESS NOTES
"Attempted to outreach patient for pre-visit via "[employer]", no answer after a week. Sending outreach via Mail Out Letter now.    "

## 2019-10-03 ENCOUNTER — OFFICE VISIT (OUTPATIENT)
Dept: FAMILY MEDICINE | Facility: CLINIC | Age: 79
End: 2019-10-03
Payer: MEDICARE

## 2019-10-03 ENCOUNTER — LAB VISIT (OUTPATIENT)
Dept: LAB | Facility: HOSPITAL | Age: 79
End: 2019-10-03
Attending: NURSE PRACTITIONER
Payer: MEDICARE

## 2019-10-03 VITALS
RESPIRATION RATE: 12 BRPM | TEMPERATURE: 98 F | HEART RATE: 77 BPM | SYSTOLIC BLOOD PRESSURE: 143 MMHG | OXYGEN SATURATION: 99 % | HEIGHT: 60 IN | DIASTOLIC BLOOD PRESSURE: 73 MMHG | BODY MASS INDEX: 23.4 KG/M2 | WEIGHT: 119.19 LBS

## 2019-10-03 DIAGNOSIS — E11.9 DIABETES MELLITUS WITHOUT COMPLICATION: ICD-10-CM

## 2019-10-03 DIAGNOSIS — H81.13 BENIGN PAROXYSMAL POSITIONAL VERTIGO DUE TO BILATERAL VESTIBULAR DISORDER: ICD-10-CM

## 2019-10-03 DIAGNOSIS — E11.42 TYPE 2 DIABETES MELLITUS WITH DIABETIC POLYNEUROPATHY, WITHOUT LONG-TERM CURRENT USE OF INSULIN: ICD-10-CM

## 2019-10-03 DIAGNOSIS — Z23 NEED FOR PNEUMOCOCCAL VACCINATION: ICD-10-CM

## 2019-10-03 DIAGNOSIS — E03.9 HYPOTHYROIDISM (ACQUIRED): ICD-10-CM

## 2019-10-03 DIAGNOSIS — E03.9 ACQUIRED HYPOTHYROIDISM: ICD-10-CM

## 2019-10-03 DIAGNOSIS — F41.1 GAD (GENERALIZED ANXIETY DISORDER): ICD-10-CM

## 2019-10-03 DIAGNOSIS — R26.89 NEED FOR ASSISTANCE DUE TO UNSTEADY GAIT: ICD-10-CM

## 2019-10-03 DIAGNOSIS — I10 ESSENTIAL HYPERTENSION: ICD-10-CM

## 2019-10-03 DIAGNOSIS — R53.1 WEAKNESS: ICD-10-CM

## 2019-10-03 DIAGNOSIS — M81.0 AGE-RELATED OSTEOPOROSIS WITHOUT CURRENT PATHOLOGICAL FRACTURE: ICD-10-CM

## 2019-10-03 DIAGNOSIS — Z23 NEED FOR VACCINATION FOR H FLU TYPE B: Primary | ICD-10-CM

## 2019-10-03 DIAGNOSIS — D64.9 ANEMIA, UNSPECIFIED TYPE: ICD-10-CM

## 2019-10-03 DIAGNOSIS — F41.9 ANXIETY: ICD-10-CM

## 2019-10-03 PROBLEM — H81.10 BPPV (BENIGN PAROXYSMAL POSITIONAL VERTIGO): Status: ACTIVE | Noted: 2019-10-03

## 2019-10-03 PROBLEM — E11.49 TYPE 2 DIABETES MELLITUS WITH NEUROLOGIC COMPLICATION, WITHOUT LONG-TERM CURRENT USE OF INSULIN: Status: ACTIVE | Noted: 2019-10-03

## 2019-10-03 LAB
ANION GAP SERPL CALC-SCNC: 7 MMOL/L (ref 8–16)
BASOPHILS # BLD AUTO: 0.12 K/UL (ref 0–0.2)
BASOPHILS NFR BLD: 1 % (ref 0–1.9)
BUN SERPL-MCNC: 19 MG/DL (ref 8–23)
CALCIUM SERPL-MCNC: 8.9 MG/DL (ref 8.7–10.5)
CHLORIDE SERPL-SCNC: 102 MMOL/L (ref 95–110)
CO2 SERPL-SCNC: 25 MMOL/L (ref 23–29)
CREAT SERPL-MCNC: 1.4 MG/DL (ref 0.5–1.4)
DIFFERENTIAL METHOD: ABNORMAL
EOSINOPHIL # BLD AUTO: 0.6 K/UL (ref 0–0.5)
EOSINOPHIL NFR BLD: 4.6 % (ref 0–8)
ERYTHROCYTE [DISTWIDTH] IN BLOOD BY AUTOMATED COUNT: 12.4 % (ref 11.5–14.5)
EST. GFR  (AFRICAN AMERICAN): 40.9 ML/MIN/1.73 M^2
EST. GFR  (NON AFRICAN AMERICAN): 35.5 ML/MIN/1.73 M^2
ESTIMATED AVG GLUCOSE: 180 MG/DL (ref 68–131)
GLUCOSE SERPL-MCNC: 119 MG/DL (ref 70–110)
HBA1C MFR BLD HPLC: 7.9 % (ref 4.5–6.2)
HCT VFR BLD AUTO: 34.7 % (ref 37–48.5)
HGB BLD-MCNC: 11.3 G/DL (ref 12–16)
IMM GRANULOCYTES # BLD AUTO: 0.04 K/UL (ref 0–0.04)
IMM GRANULOCYTES NFR BLD AUTO: 0.3 % (ref 0–0.5)
LYMPHOCYTES # BLD AUTO: 4.9 K/UL (ref 1–4.8)
LYMPHOCYTES NFR BLD: 40.9 % (ref 18–48)
MCH RBC QN AUTO: 31.7 PG (ref 27–31)
MCHC RBC AUTO-ENTMCNC: 32.6 G/DL (ref 32–36)
MCV RBC AUTO: 97 FL (ref 82–98)
MONOCYTES # BLD AUTO: 0.9 K/UL (ref 0.3–1)
MONOCYTES NFR BLD: 7.8 % (ref 4–15)
NEUTROPHILS # BLD AUTO: 5.4 K/UL (ref 1.8–7.7)
NEUTROPHILS NFR BLD: 45.4 % (ref 38–73)
NRBC BLD-RTO: 0 /100 WBC
PLATELET # BLD AUTO: 255 K/UL (ref 150–350)
PMV BLD AUTO: 9.8 FL (ref 9.2–12.9)
POTASSIUM SERPL-SCNC: 4 MMOL/L (ref 3.5–5.1)
RBC # BLD AUTO: 3.57 M/UL (ref 4–5.4)
SODIUM SERPL-SCNC: 134 MMOL/L (ref 136–145)
T4 FREE SERPL-MCNC: 1.33 NG/DL (ref 0.71–1.51)
TSH SERPL DL<=0.005 MIU/L-ACNC: 2.81 UIU/ML (ref 0.34–5.6)
WBC # BLD AUTO: 11.87 K/UL (ref 3.9–12.7)

## 2019-10-03 PROCEDURE — 84439 ASSAY OF FREE THYROXINE: CPT

## 2019-10-03 PROCEDURE — G0008 FLU VACCINE - HIGH DOSE (65+) PRESERVATIVE FREE IM: ICD-10-PCS | Mod: S$GLB,,, | Performed by: NURSE PRACTITIONER

## 2019-10-03 PROCEDURE — 90662 FLU VACCINE - HIGH DOSE (65+) PRESERVATIVE FREE IM: ICD-10-PCS | Mod: S$GLB,,, | Performed by: NURSE PRACTITIONER

## 2019-10-03 PROCEDURE — 90662 IIV NO PRSV INCREASED AG IM: CPT | Mod: S$GLB,,, | Performed by: NURSE PRACTITIONER

## 2019-10-03 PROCEDURE — 99214 OFFICE O/P EST MOD 30 MIN: CPT | Mod: 25,S$GLB,, | Performed by: NURSE PRACTITIONER

## 2019-10-03 PROCEDURE — G0009 ADMIN PNEUMOCOCCAL VACCINE: HCPCS | Mod: S$GLB,,, | Performed by: NURSE PRACTITIONER

## 2019-10-03 PROCEDURE — 90670 PNEUMOCOCCAL CONJUGATE VACCINE 13-VALENT LESS THAN 5YO & GREATER THAN: ICD-10-PCS | Mod: S$GLB,,, | Performed by: NURSE PRACTITIONER

## 2019-10-03 PROCEDURE — G0008 ADMIN INFLUENZA VIRUS VAC: HCPCS | Mod: S$GLB,,, | Performed by: NURSE PRACTITIONER

## 2019-10-03 PROCEDURE — 90670 PCV13 VACCINE IM: CPT | Mod: S$GLB,,, | Performed by: NURSE PRACTITIONER

## 2019-10-03 PROCEDURE — 84443 ASSAY THYROID STIM HORMONE: CPT

## 2019-10-03 PROCEDURE — 83036 HEMOGLOBIN GLYCOSYLATED A1C: CPT

## 2019-10-03 PROCEDURE — 36415 COLL VENOUS BLD VENIPUNCTURE: CPT

## 2019-10-03 PROCEDURE — 85025 COMPLETE CBC W/AUTO DIFF WBC: CPT

## 2019-10-03 PROCEDURE — 99214 PR OFFICE/OUTPT VISIT, EST, LEVL IV, 30-39 MIN: ICD-10-PCS | Mod: 25,S$GLB,, | Performed by: NURSE PRACTITIONER

## 2019-10-03 PROCEDURE — G0009 PNEUMOCOCCAL CONJUGATE VACCINE 13-VALENT LESS THAN 5YO & GREATER THAN: ICD-10-PCS | Mod: S$GLB,,, | Performed by: NURSE PRACTITIONER

## 2019-10-03 PROCEDURE — 80048 BASIC METABOLIC PNL TOTAL CA: CPT

## 2019-10-03 RX ORDER — ALPRAZOLAM 0.5 MG/1
0.5 TABLET ORAL 2 TIMES DAILY PRN
Qty: 60 TABLET | Refills: 2 | Status: SHIPPED | OUTPATIENT
Start: 2019-10-03 | End: 2020-01-02 | Stop reason: SDUPTHER

## 2019-10-03 NOTE — PROGRESS NOTES
Subjective:       Patient ID: Maliha Jaramillo is a 78 y.o. female.    Chief Complaint: Medication Refill (3 month f/u); Other (Patient going to eye dr in [place]; Having issues with coordination); and Immunizations (Flu and Pneumonia in office; Shingles and Td at pharmacy)  doing well except weakness and unsteady gait, son request PT/OT lives in [place]. Reports having to tell her feet what to do. vertgio mostly supine and roll over in bed yet does have some orthostatic thus educated. Admits to taking xanax early am qd yet prn in the afternoon, reports ROSIBEL controlled with current dose. Quantity decreased 12/2018 and doing well.  consistent.     Past Medical History:   Diagnosis Date    Anxiety 1954    Arthritis 2013    Hyperlipidemia 2012    Hypothyroidism (acquired) 2004    Osteoporosis 07/2019       Past Surgical History:   Procedure Laterality Date    APPENDECTOMY  1948    BREAST BIOPSY Right 1980's    benign    HYSTERECTOMY  1978    TONSILLECTOMY  1945    TOTAL REDUCTION MAMMOPLASTY Bilateral 1987        Social History     Socioeconomic History    Marital status:      Spouse name: Not on file    Number of children: Not on file    Years of education: Not on file    Highest education level: Not on file   Occupational History    Not on file   Social Needs    Financial resource strain: Not on file    Food insecurity:     Worry: Not on file     Inability: Not on file    Transportation needs:     Medical: Not on file     Non-medical: Not on file   Tobacco Use    Smoking status: Former Smoker    Smokeless tobacco: Never Used   Substance and Sexual Activity    Alcohol use: Yes     Comment: Couple of drinks per month    Drug use: No    Sexual activity: Not Currently   Lifestyle    Physical activity:     Days per week: Not on file     Minutes per session: Not on file    Stress: Not on file   Relationships    Social connections:     Talks on phone: Not on file     Gets together: Not  on file     Attends Christian service: Not on file     Active member of club or organization: Not on file     Attends meetings of clubs or organizations: Not on file     Relationship status: Not on file   Other Topics Concern    Not on file   Social History Narrative    Not on file       Family History   Problem Relation Age of Onset    Cancer Sister         Adenocarcinoma    Stroke Father        Review of patient's allergies indicates:   Allergen Reactions    Ace inhibitors Other (See Comments)     cough    Ciprofloxacin Rash          Current Outpatient Medications:     alendronate (FOSAMAX) 70 MG tablet, Take 1 tablet (70 mg total) by mouth every 7 days., Disp: 4 tablet, Rfl: 11    ALPRAZolam (XANAX) 0.5 MG tablet, Take 1 tablet (0.5 mg total) by mouth 2 (two) times daily as needed for Anxiety., Disp: 60 tablet, Rfl: 2    aspirin 325 MG tablet, Take 325 mg by mouth once daily., Disp: , Rfl:     atenolol (TENORMIN) 25 MG tablet, Take 1 tablet (25 mg total) by mouth once daily., Disp: 180 tablet, Rfl: 1    estradiol (ESTRACE) 0.01 % (0.1 mg/gram) vaginal cream, Place 1 g vaginally once daily., Disp: 46 g, Rfl: 3    glipiZIDE (GLUCOTROL) 10 MG tablet, TAKE 1 TABLET BY MOUTH TWICE DAILY BEFORE MEAL(S), Disp: 60 tablet, Rfl: 2    imipramine (TOFRANIL) 50 MG tablet, Take 1 tablet (50 mg total) by mouth every evening., Disp: 90 tablet, Rfl: 1    levothyroxine (SYNTHROID) 75 MCG tablet, Take 1 tablet (75 mcg total) by mouth once daily., Disp: 90 tablet, Rfl: 0    losartan (COZAAR) 25 MG tablet, Take 1 tablet (25 mg total) by mouth once daily., Disp: 90 tablet, Rfl: 1    pravastatin (PRAVACHOL) 10 MG tablet, TAKE 1 TABLET BY MOUTH ONCE DAILY, Disp: 90 tablet, Rfl: 1    pregabalin (LYRICA) 25 MG capsule, Take 1 capsule (25 mg total) by mouth 2 (two) times daily., Disp: 60 capsule, Rfl: 1    HPI  Review of Systems   Constitutional: Negative.         Uncoordinated since a child   HENT: Negative.    Eyes:  Negative.    Respiratory: Negative.    Cardiovascular: Negative.    Gastrointestinal: Negative.    Endocrine: Negative.    Genitourinary: Negative.    Musculoskeletal: Negative.    Skin: Negative.    Allergic/Immunologic: Negative.    Neurological: Negative.    Hematological: Negative.    Psychiatric/Behavioral: Negative.        Objective:      Physical Exam   Constitutional: She is oriented to person, place, and time. She appears well-developed and well-nourished.   HENT:   Head: Normocephalic and atraumatic.   Mouth/Throat: Oropharynx is clear and moist.   Eyes: Pupils are equal, round, and reactive to light. Conjunctivae are normal. No scleral icterus.   Neck: Normal range of motion. Neck supple. No thyromegaly present.   Cardiovascular: Normal rate and regular rhythm.   No murmur heard.  Pulmonary/Chest: Effort normal. No respiratory distress.   Abdominal: Soft. Bowel sounds are normal. She exhibits no distension. There is no tenderness.   Musculoskeletal: Normal range of motion. She exhibits no edema.   Neurological: She is alert and oriented to person, place, and time. No sensory deficit. She exhibits normal muscle tone.   Skin: Skin is warm. Capillary refill takes less than 2 seconds. No rash noted.   Psychiatric: She has a normal mood and affect. Her behavior is normal. Judgment and thought content normal.   Nursing note and vitals reviewed.      Assessment:       1. Need for vaccination for H flu type B    2. Anxiety    3. Need for pneumococcal vaccination    4. Hypothyroidism (acquired)    5. Diabetes mellitus without complication    6. Essential hypertension    7. Acquired hypothyroidism    8. Type 2 diabetes mellitus with diabetic polyneuropathy, without long-term current use of insulin    9. ROSIBEL (generalized anxiety disorder)    10. Anemia, unspecified type    11. Age-related osteoporosis without current pathological fracture    12. Weakness    13. Need for assistance due to unsteady gait    14. Benign  paroxysmal positional vertigo due to bilateral vestibular disorder        Plan:     1-  Xanax sent to pharmacy for ROSIBEL  2- labs today  3- flu today   4- pneumonia today   5- refer to PT/OT [facility] [address]  6- pt to have vision exam  7- RTC 6 weeks    Need for vaccination for H flu type B  -     Influenza - High Dose (65+) (PF) (IM)    Anxiety  -     ALPRAZolam (XANAX) 0.5 MG tablet; Take 1 tablet (0.5 mg total) by mouth 2 (two) times daily as needed for Anxiety.  Dispense: 60 tablet; Refill: 2    Need for pneumococcal vaccination  -     (In Office Administered) Pneumococcal Conjugate Vaccine (13 Valent) (IM)    Hypothyroidism (acquired)    Diabetes mellitus without complication    Essential hypertension  -     Hemoglobin A1c; Future; Expected date: 10/03/2019  -     Basic metabolic panel; Future; Expected date: 10/03/2019  -     TSH; Future; Expected date: 10/03/2019  -     T4, free; Future; Expected date: 10/03/2019  -     CBC auto differential; Future; Expected date: 10/03/2019    Acquired hypothyroidism  -     Hemoglobin A1c; Future; Expected date: 10/03/2019  -     Basic metabolic panel; Future; Expected date: 10/03/2019  -     TSH; Future; Expected date: 10/03/2019  -     T4, free; Future; Expected date: 10/03/2019  -     CBC auto differential; Future; Expected date: 10/03/2019    Type 2 diabetes mellitus with diabetic polyneuropathy, without long-term current use of insulin  -     Hemoglobin A1c; Future; Expected date: 10/03/2019  -     Basic metabolic panel; Future; Expected date: 10/03/2019  -     TSH; Future; Expected date: 10/03/2019  -     T4, free; Future; Expected date: 10/03/2019  -     CBC auto differential; Future; Expected date: 10/03/2019    ROSIBEL (generalized anxiety disorder)    Anemia, unspecified type  -     Hemoglobin A1c; Future; Expected date: 10/03/2019  -     Basic metabolic panel; Future; Expected date: 10/03/2019  -     TSH; Future; Expected date: 10/03/2019  -     T4, free; Future;  Expected date: 10/03/2019  -     CBC auto differential; Future; Expected date: 10/03/2019    Age-related osteoporosis without current pathological fracture    Weakness  -     Cancel: Ambulatory Referral to Physical/Occupational Therapy  -     Ambulatory Referral to Physical/Occupational Therapy    Need for assistance due to unsteady gait  -     Cancel: Ambulatory Referral to Physical/Occupational Therapy  -     Ambulatory Referral to Physical/Occupational Therapy    Benign paroxysmal positional vertigo due to bilateral vestibular disorder  -     Ambulatory Referral to Physical/Occupational Therapy        Risks, benefits, and side effects were discussed with the patient. All questions were answered to the fullest satisfaction of the patient, and pt verbalized understanding and agreement to treatment plan. Pt was to call with any new or worsening symptoms, or present to the ER.

## 2019-10-04 ENCOUNTER — NURSE TRIAGE (OUTPATIENT)
Dept: ADMINISTRATIVE | Facility: CLINIC | Age: 79
End: 2019-10-04

## 2019-10-04 NOTE — TELEPHONE ENCOUNTER
Reason for Disposition   Unresponsive, passed out or very weak    Additional Information   Negative: [1] Life-threatening reaction (anaphylaxis) in the past to similar substance (e.g., food, insect bite/sting, chemical, etc.) AND [2] < 2 hours since exposure    Protocols used: FACE SWELLING-A-AH    Patient's left side of the face is severely swollen on the left side. She had the flu shot and pneumonia shot yesterday. Advised her to call 911 because she states she feels really weak and the swelling hurts. She verbalized understanding.

## 2019-10-08 ENCOUNTER — PATIENT MESSAGE (OUTPATIENT)
Dept: CARDIOLOGY | Facility: CLINIC | Age: 79
End: 2019-10-08

## 2019-10-08 NOTE — TELEPHONE ENCOUNTER
Attempted to call patient, left voice mail to return call.  Attempted to call her son, his number is no longer in service.

## 2019-10-09 ENCOUNTER — PATIENT MESSAGE (OUTPATIENT)
Dept: FAMILY MEDICINE | Facility: CLINIC | Age: 79
End: 2019-10-09

## 2019-10-10 NOTE — TELEPHONE ENCOUNTER
Attempted to contact patient to ask how she is doing. It went straight to voicemail. LVM to call back.

## 2019-10-24 ENCOUNTER — DOCUMENTATION ONLY (OUTPATIENT)
Dept: FAMILY MEDICINE | Facility: CLINIC | Age: 79
End: 2019-10-24

## 2019-10-24 NOTE — PROGRESS NOTES
"Referral for PT/OT faxed to [facility].  Confirmation received.    Your fax has been successfully sent to [phone] at [id number].  ------------------------------------------------------------  From: [id number]  ------------------------------------------------------------  10/24/2019 10:40:04 AM Transmission Record          Sent to [id number] with remote ID "[email or web address]"          Result: (0/339;0/0) Success          Page record: 1 - 16          Elapsed time: 04:59 on channel 27  "

## 2019-10-29 ENCOUNTER — PATIENT OUTREACH (OUTPATIENT)
Dept: ADMINISTRATIVE | Facility: HOSPITAL | Age: 79
End: 2019-10-29

## 2019-10-30 ENCOUNTER — DOCUMENTATION ONLY (OUTPATIENT)
Dept: FAMILY MEDICINE | Facility: CLINIC | Age: 79
End: 2019-10-30

## 2019-10-30 DIAGNOSIS — E11.9 DIABETES MELLITUS WITHOUT COMPLICATION: ICD-10-CM

## 2019-10-30 NOTE — PROGRESS NOTES
Called [facility] in [place] to check status of referral.  They stated the patient is scheduled for initial consult on 11/6/19 at 2pm.

## 2019-11-01 RX ORDER — GLIPIZIDE 10 MG/1
TABLET ORAL
Qty: 180 TABLET | Refills: 0 | Status: SHIPPED | OUTPATIENT
Start: 2019-11-01 | End: 2020-02-03

## 2019-11-26 ENCOUNTER — OFFICE VISIT (OUTPATIENT)
Dept: FAMILY MEDICINE | Facility: CLINIC | Age: 79
End: 2019-11-26
Payer: MEDICARE

## 2019-11-26 VITALS
TEMPERATURE: 98 F | RESPIRATION RATE: 15 BRPM | OXYGEN SATURATION: 93 % | DIASTOLIC BLOOD PRESSURE: 66 MMHG | WEIGHT: 116 LBS | HEART RATE: 69 BPM | SYSTOLIC BLOOD PRESSURE: 138 MMHG | BODY MASS INDEX: 22.78 KG/M2 | HEIGHT: 60 IN

## 2019-11-26 DIAGNOSIS — I10 ESSENTIAL HYPERTENSION: Primary | ICD-10-CM

## 2019-11-26 DIAGNOSIS — F41.1 GAD (GENERALIZED ANXIETY DISORDER): ICD-10-CM

## 2019-11-26 DIAGNOSIS — E03.9 ACQUIRED HYPOTHYROIDISM: ICD-10-CM

## 2019-11-26 DIAGNOSIS — E11.42 TYPE 2 DIABETES MELLITUS WITH DIABETIC POLYNEUROPATHY, WITHOUT LONG-TERM CURRENT USE OF INSULIN: ICD-10-CM

## 2019-11-26 PROCEDURE — 1159F MED LIST DOCD IN RCRD: CPT | Mod: S$GLB,,, | Performed by: NURSE PRACTITIONER

## 2019-11-26 PROCEDURE — 1159F PR MEDICATION LIST DOCUMENTED IN MEDICAL RECORD: ICD-10-PCS | Mod: S$GLB,,, | Performed by: NURSE PRACTITIONER

## 2019-11-26 PROCEDURE — 1125F AMNT PAIN NOTED PAIN PRSNT: CPT | Mod: S$GLB,,, | Performed by: NURSE PRACTITIONER

## 2019-11-26 PROCEDURE — 99213 OFFICE O/P EST LOW 20 MIN: CPT | Mod: S$GLB,,, | Performed by: NURSE PRACTITIONER

## 2019-11-26 PROCEDURE — 99213 PR OFFICE/OUTPT VISIT, EST, LEVL III, 20-29 MIN: ICD-10-PCS | Mod: S$GLB,,, | Performed by: NURSE PRACTITIONER

## 2019-11-26 PROCEDURE — 1125F PR PAIN SEVERITY QUANTIFIED, PAIN PRESENT: ICD-10-PCS | Mod: S$GLB,,, | Performed by: NURSE PRACTITIONER

## 2019-11-26 RX ORDER — VALSARTAN 40 MG/1
40 TABLET ORAL DAILY
Qty: 90 TABLET | Refills: 3 | Status: SHIPPED | OUTPATIENT
Start: 2019-11-26 | End: 2021-04-12

## 2019-11-26 RX ORDER — ESTRADIOL 0.1 MG/G
1 CREAM VAGINAL
Qty: 46 G | Refills: 3
Start: 2019-11-28 | End: 2020-08-05

## 2019-11-26 NOTE — PROGRESS NOTES
Subjective:       Patient ID: Maliha Jaramillo is a 78 y.o. female.    Chief Complaint: Follow-up; Diabetic Eye Photo (have an eye appointment ); and Immunizations (shingles and tetanus- pt not sure if she should take them due to having an allergic reaction to the flu and pneumoccoccal shot)        Past Medical History:   Diagnosis Date    Anxiety 1954    Arthritis 2013    Hyperlipidemia 2012    Hypothyroidism (acquired) 2004    Osteoporosis 07/2019       Past Surgical History:   Procedure Laterality Date    APPENDECTOMY  1948    BREAST BIOPSY Right 1980's    benign    HYSTERECTOMY  1978    TONSILLECTOMY  1945    TOTAL REDUCTION MAMMOPLASTY Bilateral 1987        Social History     Socioeconomic History    Marital status:      Spouse name: Not on file    Number of children: Not on file    Years of education: Not on file    Highest education level: Not on file   Occupational History    Not on file   Social Needs    Financial resource strain: Not on file    Food insecurity:     Worry: Not on file     Inability: Not on file    Transportation needs:     Medical: Not on file     Non-medical: Not on file   Tobacco Use    Smoking status: Former Smoker    Smokeless tobacco: Never Used   Substance and Sexual Activity    Alcohol use: Yes     Comment: Couple of drinks per month    Drug use: No    Sexual activity: Not Currently   Lifestyle    Physical activity:     Days per week: Not on file     Minutes per session: Not on file    Stress: Not on file   Relationships    Social connections:     Talks on phone: Not on file     Gets together: Not on file     Attends [employer] service: Not on file     Active member of club or organization: Not on file     Attends meetings of clubs or organizations: Not on file     Relationship status: Not on file   Other Topics Concern    Not on file   Social History Narrative    Not on file       Family History   Problem Relation Age of Onset    Cancer Sister          Adenocarcinoma    Stroke Father        Review of patient's allergies indicates:   Allergen Reactions    Flu vaccine [id number](5 yr up) Anaphylaxis     Patient's left side of the face is severely swollen on the left side then became unresponsive.     Pneumococcal vaccine Anaphylaxis     Patient's left side of the face is severely swollen on the left side then became unresponsive. Had flu shot same day.     Ace inhibitors Other (See Comments)     cough    Ciprofloxacin Rash          Current Outpatient Medications:     alendronate (FOSAMAX) 70 MG tablet, Take 1 tablet (70 mg total) by mouth every 7 days., Disp: 4 tablet, Rfl: 11    ALPRAZolam (XANAX) 0.5 MG tablet, Take 1 tablet (0.5 mg total) by mouth 2 (two) times daily as needed for Anxiety., Disp: 60 tablet, Rfl: 2    aspirin 325 MG tablet, Take 325 mg by mouth once daily., Disp: , Rfl:     atenolol (TENORMIN) 25 MG tablet, Take 1 tablet (25 mg total) by mouth once daily., Disp: 180 tablet, Rfl: 1    [START ON 11/28/2019] estradiol (ESTRACE) 0.01 % (0.1 mg/gram) vaginal cream, Place 1 g vaginally twice a week., Disp: 46 g, Rfl: 3    glipiZIDE (GLUCOTROL) 10 MG tablet, TAKE 1 TABLET BY MOUTH TWICE DAILY BEFORE MEAL(S), Disp: 180 tablet, Rfl: 0    imipramine (TOFRANIL) 50 MG tablet, Take 1 tablet (50 mg total) by mouth every evening., Disp: 90 tablet, Rfl: 1    levothyroxine (SYNTHROID) 75 MCG tablet, Take 1 tablet (75 mcg total) by mouth once daily., Disp: 90 tablet, Rfl: 0    liraglutide 0.6 mg/0.1 mL, 18 mg/3 mL, subq PNIJ (VICTOZA 2-TESFAYE) 0.6 mg/0.1 mL (18 mg/3 mL) PnIj, 0.6 mg sq qd for 1 week then increase weekly by 0.6 mg to max dose of 1.8 mg daily. Call for refill, Disp: 6 mL, Rfl: 0    pravastatin (PRAVACHOL) 10 MG tablet, TAKE 1 TABLET BY MOUTH ONCE DAILY, Disp: 90 tablet, Rfl: 1    pregabalin (LYRICA) 25 MG capsule, Take 1 capsule (25 mg total) by mouth 2 (two) times daily., Disp: 60 capsule, Rfl: 1    valsartan (DIOVAN) 40 MG  tablet, Take 1 tablet (40 mg total) by mouth once daily., Disp: 90 tablet, Rfl: 3    HPI  Review of Systems    Objective:      Physical Exam    Assessment:       1. Essential hypertension    2. Type 2 diabetes mellitus with diabetic polyneuropathy, without long-term current use of insulin    3. Acquired hypothyroidism    4. ROSIBEL (generalized anxiety disorder)        Plan:     1- xanax sent 10/3/19 with 2 RF  2- continue with physical therapy  3- allergies updated by NP  4- stop losartan and start valsartan  5- start victoza if paid by ins.     Essential hypertension  -     valsartan (DIOVAN) 40 MG tablet; Take 1 tablet (40 mg total) by mouth once daily.  Dispense: 90 tablet; Refill: 3    Type 2 diabetes mellitus with diabetic polyneuropathy, without long-term current use of insulin  -     liraglutide 0.6 mg/0.1 mL, 18 mg/3 mL, subq PNIJ (VICTOZA 2-TESFAYE) 0.6 mg/0.1 mL (18 mg/3 mL) PnIj; 0.6 mg sq qd for 1 week then increase weekly by 0.6 mg to max dose of 1.8 mg daily. Call for refill  Dispense: 6 mL; Refill: 0    Acquired hypothyroidism    ROSIBEL (generalized anxiety disorder)    Other orders  -     estradiol (ESTRACE) 0.01 % (0.1 mg/gram) vaginal cream; Place 1 g vaginally twice a week.  Dispense: 46 g; Refill: 3        Risks, benefits, and side effects were discussed with the patient. All questions were answered to the fullest satisfaction of the patient, and pt verbalized understanding and agreement to treatment plan. Pt was to call with any new or worsening symptoms, or present to the ER.

## 2019-12-03 DIAGNOSIS — E11.40 NEUROPATHY DUE TO TYPE 2 DIABETES MELLITUS: ICD-10-CM

## 2019-12-04 RX ORDER — PREGABALIN 25 MG/1
CAPSULE ORAL
Qty: 60 CAPSULE | Refills: 2 | Status: SHIPPED | OUTPATIENT
Start: 2019-12-04 | End: 2019-12-05 | Stop reason: SDUPTHER

## 2019-12-05 ENCOUNTER — TELEPHONE (OUTPATIENT)
Dept: FAMILY MEDICINE | Facility: CLINIC | Age: 79
End: 2019-12-05

## 2019-12-05 DIAGNOSIS — E11.40 NEUROPATHY DUE TO TYPE 2 DIABETES MELLITUS: ICD-10-CM

## 2019-12-05 RX ORDER — PREGABALIN 25 MG/1
25 CAPSULE ORAL 2 TIMES DAILY
Qty: 60 CAPSULE | Refills: 5 | Status: SHIPPED | OUTPATIENT
Start: 2019-12-05 | End: 2020-07-07

## 2019-12-09 ENCOUNTER — PATIENT MESSAGE (OUTPATIENT)
Dept: FAMILY MEDICINE | Facility: CLINIC | Age: 79
End: 2019-12-09

## 2019-12-27 ENCOUNTER — PATIENT MESSAGE (OUTPATIENT)
Dept: FAMILY MEDICINE | Facility: CLINIC | Age: 79
End: 2019-12-27

## 2020-01-02 DIAGNOSIS — E03.9 HYPOTHYROIDISM (ACQUIRED): ICD-10-CM

## 2020-01-02 DIAGNOSIS — F41.9 ANXIETY: ICD-10-CM

## 2020-01-02 RX ORDER — ALPRAZOLAM 0.5 MG/1
0.5 TABLET ORAL 2 TIMES DAILY PRN
Qty: 60 TABLET | Refills: 2 | Status: SHIPPED | OUTPATIENT
Start: 2020-01-02 | End: 2020-05-04

## 2020-01-02 RX ORDER — LEVOTHYROXINE SODIUM 75 UG/1
75 TABLET ORAL
Qty: 90 TABLET | Refills: 1 | Status: SHIPPED | OUTPATIENT
Start: 2020-01-02 | End: 2020-03-17

## 2020-01-16 DIAGNOSIS — E78.5 HYPERLIPIDEMIA ASSOCIATED WITH TYPE 2 DIABETES MELLITUS: ICD-10-CM

## 2020-01-16 DIAGNOSIS — E11.69 HYPERLIPIDEMIA ASSOCIATED WITH TYPE 2 DIABETES MELLITUS: ICD-10-CM

## 2020-01-20 RX ORDER — PRAVASTATIN SODIUM 10 MG/1
TABLET ORAL
Qty: 90 TABLET | Refills: 3 | Status: SHIPPED | OUTPATIENT
Start: 2020-01-20 | End: 2020-05-06

## 2020-02-01 DIAGNOSIS — E11.9 DIABETES MELLITUS WITHOUT COMPLICATION: ICD-10-CM

## 2020-02-03 RX ORDER — GLIPIZIDE 10 MG/1
TABLET ORAL
Qty: 180 TABLET | Refills: 3 | Status: SHIPPED | OUTPATIENT
Start: 2020-02-03 | End: 2021-02-12

## 2020-02-17 ENCOUNTER — PATIENT OUTREACH (OUTPATIENT)
Dept: ADMINISTRATIVE | Facility: HOSPITAL | Age: 80
End: 2020-02-17

## 2020-02-18 ENCOUNTER — PATIENT OUTREACH (OUTPATIENT)
Dept: ADMINISTRATIVE | Facility: HOSPITAL | Age: 80
End: 2020-02-18

## 2020-02-18 NOTE — LETTER
FAX      AUTHORIZATION FOR RELEASE OF   CONFIDENTIAL INFORMATION            Dr. ADDIE DUNAWAY      We are seeing Maliha Jaramillo, date of birth 1940, in the clinic at [facility]. Gladis Mirza NP is the patient's PCP. Maliha Jaramillo has an outstanding lab/procedure at the time we reviewed her chart. In order to help keep her health information updated, she has authorized us to request the following medical record(s):        (  )  MAMMOGRAM                                      (  )  COLONOSCOPY      (  )  PAP SMEAR                                          (  )  OUTSIDE LAB RESULTS     (  )  DEXA SCAN                                          ( X )  DIABETIC EYE EXAM            (  )  DIABETIC FOOT EXAM                        (  )  ENTIRE RECORD     (  )  OUTSIDE IMMUNIZATIONS                 (  )  _______________        Please fax records to [facility]  [phone]     If you have any questions, please contact Gi at [phone].      Gi Mcgrath L.P.N. Clinical Care Coordinator  [address] [place] [address], [place] [address] [phone]

## 2020-03-11 ENCOUNTER — OFFICE VISIT (OUTPATIENT)
Dept: FAMILY MEDICINE | Facility: CLINIC | Age: 80
End: 2020-03-11
Payer: MEDICARE

## 2020-03-11 ENCOUNTER — HOSPITAL ENCOUNTER (EMERGENCY)
Facility: HOSPITAL | Age: 80
Discharge: HOME | End: 2020-03-11
Attending: INTERNAL MEDICINE
Payer: MEDICARE

## 2020-03-11 VITALS
TEMPERATURE: 98 F | SYSTOLIC BLOOD PRESSURE: 113 MMHG | WEIGHT: 115 LBS | DIASTOLIC BLOOD PRESSURE: 76 MMHG | HEIGHT: 60 IN | OXYGEN SATURATION: 98 % | RESPIRATION RATE: 15 BRPM | HEART RATE: 171 BPM | BODY MASS INDEX: 22.58 KG/M2

## 2020-03-11 VITALS
OXYGEN SATURATION: 98 % | HEART RATE: 94 BPM | TEMPERATURE: 99 F | WEIGHT: 111 LBS | HEIGHT: 60 IN | SYSTOLIC BLOOD PRESSURE: 120 MMHG | DIASTOLIC BLOOD PRESSURE: 84 MMHG | RESPIRATION RATE: 11 BRPM | BODY MASS INDEX: 21.79 KG/M2

## 2020-03-11 DIAGNOSIS — I47.10 SUPRAVENTRICULAR TACHYCARDIA: Primary | ICD-10-CM

## 2020-03-11 DIAGNOSIS — R00.0 TACHYCARDIA: Primary | ICD-10-CM

## 2020-03-11 DIAGNOSIS — R00.0 TACHYARRHYTHMIA: ICD-10-CM

## 2020-03-11 LAB
ALBUMIN SERPL BCP-MCNC: 3.3 G/DL (ref 3.5–5.2)
ALP SERPL-CCNC: 81 U/L (ref 55–135)
ALT SERPL W/O P-5'-P-CCNC: 38 U/L (ref 10–44)
ANION GAP SERPL CALC-SCNC: 8 MMOL/L (ref 8–16)
AST SERPL-CCNC: 58 U/L (ref 10–40)
BASOPHILS # BLD AUTO: 0.09 K/UL (ref 0–0.2)
BASOPHILS NFR BLD: 0.8 % (ref 0–1.9)
BILIRUB SERPL-MCNC: 0.4 MG/DL (ref 0.1–1)
BNP SERPL-MCNC: 539 PG/ML (ref 0–99)
BUN SERPL-MCNC: 14 MG/DL (ref 8–23)
CALCIUM SERPL-MCNC: 8.7 MG/DL (ref 8.7–10.5)
CHLORIDE SERPL-SCNC: 102 MMOL/L (ref 95–110)
CO2 SERPL-SCNC: 25 MMOL/L (ref 23–29)
CREAT SERPL-MCNC: 1.4 MG/DL (ref 0.5–1.4)
DIFFERENTIAL METHOD: ABNORMAL
EOSINOPHIL # BLD AUTO: 0.2 K/UL (ref 0–0.5)
EOSINOPHIL NFR BLD: 1.6 % (ref 0–8)
ERYTHROCYTE [DISTWIDTH] IN BLOOD BY AUTOMATED COUNT: 14 % (ref 11.5–14.5)
EST. GFR  (AFRICAN AMERICAN): 40.6 ML/MIN/1.73 M^2
EST. GFR  (NON AFRICAN AMERICAN): 35.3 ML/MIN/1.73 M^2
GLUCOSE SERPL-MCNC: 151 MG/DL (ref 70–110)
HCT VFR BLD AUTO: 32.9 % (ref 37–48.5)
HGB BLD-MCNC: 10.8 G/DL (ref 12–16)
IMM GRANULOCYTES # BLD AUTO: 0.04 K/UL (ref 0–0.04)
IMM GRANULOCYTES NFR BLD AUTO: 0.4 % (ref 0–0.5)
LYMPHOCYTES # BLD AUTO: 3.9 K/UL (ref 1–4.8)
LYMPHOCYTES NFR BLD: 35.5 % (ref 18–48)
MCH RBC QN AUTO: 30.6 PG (ref 27–31)
MCHC RBC AUTO-ENTMCNC: 32.8 G/DL (ref 32–36)
MCV RBC AUTO: 93 FL (ref 82–98)
MONOCYTES # BLD AUTO: 1 K/UL (ref 0.3–1)
MONOCYTES NFR BLD: 9.6 % (ref 4–15)
NEUTROPHILS # BLD AUTO: 5.7 K/UL (ref 1.8–7.7)
NEUTROPHILS NFR BLD: 52.1 % (ref 38–73)
NRBC BLD-RTO: 0 /100 WBC
PLATELET # BLD AUTO: 263 K/UL (ref 150–350)
PMV BLD AUTO: 10.2 FL (ref 9.2–12.9)
POTASSIUM SERPL-SCNC: 4.4 MMOL/L (ref 3.5–5.1)
PROT SERPL-MCNC: 7.1 G/DL (ref 6–8.4)
RBC # BLD AUTO: 3.53 M/UL (ref 4–5.4)
SODIUM SERPL-SCNC: 135 MMOL/L (ref 136–145)
WBC # BLD AUTO: 10.84 K/UL (ref 3.9–12.7)

## 2020-03-11 PROCEDURE — 80053 COMPREHEN METABOLIC PANEL: CPT

## 2020-03-11 PROCEDURE — 63600175 PHARM REV CODE 636 W HCPCS: Performed by: INTERNAL MEDICINE

## 2020-03-11 PROCEDURE — 99284 EMERGENCY DEPT VISIT MOD MDM: CPT | Mod: 25

## 2020-03-11 PROCEDURE — 25000003 PHARM REV CODE 250: Performed by: INTERNAL MEDICINE

## 2020-03-11 PROCEDURE — 96374 THER/PROPH/DIAG INJ IV PUSH: CPT

## 2020-03-11 PROCEDURE — 25000003 PHARM REV CODE 250

## 2020-03-11 PROCEDURE — 63600175 PHARM REV CODE 636 W HCPCS

## 2020-03-11 PROCEDURE — 85025 COMPLETE CBC W/AUTO DIFF WBC: CPT

## 2020-03-11 PROCEDURE — 83880 ASSAY OF NATRIURETIC PEPTIDE: CPT

## 2020-03-11 PROCEDURE — 99214 OFFICE O/P EST MOD 30 MIN: CPT | Mod: S$GLB,,, | Performed by: FAMILY MEDICINE

## 2020-03-11 PROCEDURE — 36415 COLL VENOUS BLD VENIPUNCTURE: CPT

## 2020-03-11 PROCEDURE — 99214 PR OFFICE/OUTPT VISIT, EST, LEVL IV, 30-39 MIN: ICD-10-PCS | Mod: S$GLB,,, | Performed by: FAMILY MEDICINE

## 2020-03-11 PROCEDURE — 93005 ELECTROCARDIOGRAM TRACING: CPT

## 2020-03-11 RX ORDER — ADENOSINE 3 MG/ML
INJECTION, SOLUTION INTRAVENOUS
Status: COMPLETED
Start: 2020-03-11 | End: 2020-03-11

## 2020-03-11 RX ORDER — DILTIAZEM HYDROCHLORIDE 120 MG/1
300 TABLET, FILM COATED ORAL DAILY
COMMUNITY
End: 2020-03-17

## 2020-03-11 RX ORDER — NEBIVOLOL 5 MG/1
10 TABLET ORAL DAILY
Qty: 60 TABLET | Refills: 11 | Status: SHIPPED | OUTPATIENT
Start: 2020-03-11 | End: 2020-04-15

## 2020-03-11 RX ORDER — LABETALOL 100 MG/1
TABLET, FILM COATED ORAL
Status: COMPLETED
Start: 2020-03-11 | End: 2020-03-11

## 2020-03-11 RX ORDER — LABETALOL 100 MG/1
100 TABLET, FILM COATED ORAL
Status: COMPLETED | OUTPATIENT
Start: 2020-03-11 | End: 2020-03-11

## 2020-03-11 RX ORDER — ADENOSINE 3 MG/ML
3 INJECTION, SOLUTION INTRAVENOUS
Status: COMPLETED | OUTPATIENT
Start: 2020-03-11 | End: 2020-03-11

## 2020-03-11 RX ORDER — DILTIAZEM HYDROCHLORIDE 30 MG/1
90 TABLET, FILM COATED ORAL
Status: DISCONTINUED | OUTPATIENT
Start: 2020-03-11 | End: 2020-03-11 | Stop reason: HOSPADM

## 2020-03-11 RX ADMIN — LABETALOL HYDROCHLORIDE 100 MG: 100 TABLET, FILM COATED ORAL at 05:03

## 2020-03-11 RX ADMIN — ADENOSINE 3 MG: 3 INJECTION, SOLUTION INTRAVENOUS at 05:03

## 2020-03-11 RX ADMIN — LABETALOL HCL 100 MG: 100 TABLET, FILM COATED ORAL at 05:03

## 2020-03-11 NOTE — ED NOTES
[place] Ellapresents to ED with afib pt was up stairs at her general dr when this started  Mucous membranes are pink and moist. Skin is warm, dry and intact. Lungs are clear bilaterally, respirations are regular and unlabored. Denies cough, congestion or rhinorrhea. BS active x4, no tenderness with palpitation, abd is soft and not distended. Denies any appetite or activity change. S1S2, capillary refill is < 2 secs.Denies dysuria, difficulty urinating, frequency, numbness, tingling or weakness. Upon arrival heart rate 170's

## 2020-03-11 NOTE — ED PROVIDER NOTES
Encounter Date: 3/11/2020       History     Chief Complaint   Patient presents with    Shortness of Breath     Patient comes to the ED with supraventricular tachycardia.  She was seen by primary care physician noted that she was tachycardic at 1:50 a.m. or above and somewhat tachypneic.  Patient states she is not terribly short of breath and no chest pain.  Arrived in the ED heart rate was 150 and regular.  EKG suggests supraventricular tachycardia.  Atrial flutter was not noted.    Exam was otherwise negative.    She was given 3 mg of adenosine with no response.  She was given 6 mg of adenosine with a significant pause and then resumption of normal sinus rhythm.  She remained in sinus rhythm after this.  She was given labetalol 100 mg since she is already on diltiazem 240.  She was on the diltiazem and did take her dose today        Review of patient's allergies indicates:   Allergen Reactions    Flu vaccine [id number](5 yr up) Anaphylaxis     Patient's left side of the face is severely swollen on the left side then became unresponsive.     Pneumococcal vaccine Anaphylaxis     Patient's left side of the face is severely swollen on the left side then became unresponsive. Had flu shot same day.     Ace inhibitors Other (See Comments)     cough    Ciprofloxacin Rash     Past Medical History:   Diagnosis Date    Anxiety 1954    Arthritis 2013    Hyperlipidemia 2012    Hypothyroidism (acquired) 2004    Osteoporosis 07/2019     Past Surgical History:   Procedure Laterality Date    APPENDECTOMY  1948    BREAST BIOPSY Right 1980's    benign    HYSTERECTOMY  1978    TONSILLECTOMY  1945    TOTAL REDUCTION MAMMOPLASTY Bilateral 1987     Family History   Problem Relation Age of Onset    Cancer Sister         Adenocarcinoma    Stroke Father      Social History     Tobacco Use    Smoking status: Former Smoker    Smokeless tobacco: Never Used   Substance Use Topics    Alcohol use: Yes     Comment: Couple of  drinks per month    Drug use: No     Review of Systems   Constitutional: Negative for fever.   HENT: Negative for sore throat.    Respiratory: Negative for shortness of breath.    Cardiovascular: Negative for chest pain.   Gastrointestinal: Negative for nausea.   Genitourinary: Negative for dysuria.   Musculoskeletal: Negative for back pain.   Skin: Negative for rash.   Neurological: Negative for weakness.   Hematological: Does not bruise/bleed easily.   All other systems reviewed and are negative.      Physical Exam     Initial Vitals [03/11/20 1648]   BP Pulse Resp Temp SpO2   (P) 120/84 (!) 160 20 -- --      MAP       --         Physical Exam    Nursing note and vitals reviewed.  Constitutional: Vital signs are normal. She appears well-developed and well-nourished. She is active and cooperative.   HENT:   Head: Normocephalic and atraumatic.   Eyes: Conjunctivae and lids are normal. Lids are everted and swept, no foreign bodies found.   Neck: Trachea normal, normal range of motion and full passive range of motion without pain. Neck supple.   Cardiovascular: Normal rate, regular rhythm, S1 normal, S2 normal, normal heart sounds, intact distal pulses and normal pulses.  No extrasystoles are present.    Abdominal: Soft. Normal appearance and bowel sounds are normal.   Musculoskeletal: Normal range of motion.   Neurological: She is alert. She has normal reflexes. GCS eye subscore is 4. GCS verbal subscore is 5. GCS motor subscore is 6.   Skin: Skin is warm, dry and intact. Capillary refill takes less than 2 seconds.   Psychiatric: She has a normal mood and affect. Her speech is normal and behavior is normal. Cognition and memory are normal.         ED Course   Procedures  Labs Reviewed   CBC W/ AUTO DIFFERENTIAL - Abnormal; Notable for the following components:       Result Value    RBC 3.53 (*)     Hemoglobin 10.8 (*)     Hematocrit 32.9 (*)     All other components within normal limits   COMPREHENSIVE METABOLIC  PANEL   B-TYPE NATRIURETIC PEPTIDE     EKG Readings: (Independently Interpreted)   Rhythm: Supraventricular Tachycardia (SVT). Ectopy: No Ectopy. Conduction: Normal. ST Segments: Normal ST Segments. T Waves: Normal. Axis: Normal. Clinical Impression: Supraventricular Tachycardia (SVT) Other Impression: Post conversion EKG shows normal sinus rhythm with right bundle-branch block and low voltage but normal otherwise       Imaging Results    None          Medical Decision Making:   Clinical Tests:   Lab Tests: Ordered and Reviewed  Medical Tests: Ordered and Reviewed  ED Management:  Patient comes in with supraventricular tachycardia with a heart rate of 150.  Patient was given initially 3 mg of adenosine with no response and then 6 mg of adenosine with conversion to normal sinus rhythm.  Patient felt the difference and felt more comfortable.  Laboratory studies were otherwise unremarkable.  She is started on Bystolic 5 mg to continue with the diltiazem 240.  Follow-up with both her cardiologist and primary care physician                                 Clinical Impression:       ICD-10-CM ICD-9-CM   1. Supraventricular tachycardia I47.1 427.89         Disposition:   Disposition: Discharged  Condition: Stable     ED Disposition Condition    Discharge Stable        ED Prescriptions     Medication Sig Dispense Start Date End Date Auth. Provider    nebivoloL (BYSTOLIC) 5 MG Tab Take 2 tablets (10 mg total) by mouth once daily. 60 tablet 3/11/2020 3/11/2021 Mitch Wing MD        Follow-up Information     Follow up With Specialties Details Why Contact Info    Ike Valdivia MD Cardiovascular Disease, Cardiology   [address] [place] [id number]  [phone]                                       Mitch Wing MD  03/11/20 0558

## 2020-03-11 NOTE — PROGRESS NOTES
[facility] - Clinic Note    Subjective      Ms. Jaramillo is a 79 y.o. female who presents to clinic for follow up.    She presents today with the following symptoms  Weak  Lethargic  Symptoms have been going on for one week  Knee pain  Is on Diltiazem for A-fib? Patient is not sure.   Shortness of breath  Fever subjective for the last three days.     PM [place] has a past medical history of Anxiety (1954), Arthritis (2013), Hyperlipidemia (2012), Hypothyroidism (acquired) (2004), and Osteoporosis (07/2019).   PSXH [place] has a past surgical history that includes Hysterectomy (1978); Breast biopsy (Right, 1980's); Total Reduction Mammoplasty (Bilateral, 1987); Appendectomy (1948); and Tonsillectomy (1945).    Virginia's family history includes Cancer in her sister; Stroke in her father.    [place]ia reports that she has quit smoking. She has never used smokeless tobacco. She reports that she drinks alcohol. She reports that she does not use drugs.   [facility] [place] is allergic to flu vaccine [id number](5 yr up); pneumococcal vaccine; ace inhibitors; and ciprofloxacin.   [facility] [place] has a current medication list which includes the following prescription(s): alendronate, alprazolam, aspirin, estradiol, glipizide, imipramine, liraglutide 0.6 mg/0.1 ml (18 mg/3 ml) subq pnij, pravastatin, pregabalin, valsartan, carvedilol, diltiazem, levothyroxine, nebivolol, and sulfamethoxazole-trimethoprim 800-160mg.     Review of Systems   Constitutional: Positive for fatigue and fever.   Respiratory: Positive for shortness of breath.    Cardiovascular: Positive for palpitations. Negative for chest pain.     ROS otherwise negative  Objective     /76 (BP Location: Right arm, Patient Position: Sitting, BP Method: Medium (Automatic))   Pulse (!) 171   Temp 97.5 °F (36.4 °C) (Oral)   Resp 15   Ht 5' (1.524 m)   Wt 52.2 kg (115 lb)   SpO2 98%   BMI 22.46 kg/m²     Physical Exam   Constitutional: She appears  well-developed and well-nourished. No distress.   Cardiovascular: Intact distal pulses. Exam reveals no friction rub.   Tachycardia, irregular rhythm, no murmur   Pulmonary/Chest: Effort normal and breath sounds normal. She has no wheezes. She has no rales.   Neurological: She is alert.   Skin: Skin is warm and dry. She is not diaphoretic.   Vitals reviewed.     Assessment/Plan     1. Tachycardia         Discussed tachycardia with patient. Given uncertain history and significance of tachycardia, will send to the ER for further work up and evaluation.   Discussed with ER attending.         Future Appointments   Date Time Provider Department Center   4/28/2020 10:00 AM [facility], LABORATORY [facility] LAB [facility]   5/1/2020  3:00 PM Gladis Mirza NP [facility]       Cassy Carballo MD  Family Medicine  [facility]  [phone]

## 2020-03-11 NOTE — ED NOTES
Pt came into the ER today from the clinic upstairs having tachycardia . When pt arrived to room six she was running in the 170s.. Pt was put on monitor pt was given adenosine and PO labetolol. Pt is Normal sinus rhythm at this time 1748. Son at her bedside went over dc paperwork and new RX . Son and pt verbalized understanding of medication and following up with DR. Valdivia. They have a visit planned for tomorrow.

## 2020-03-11 NOTE — ED NOTES
Bed: Exam 06  Expected date: 3/11/20  Expected time:   Means of arrival:   Comments:  Clinic patient

## 2020-03-17 ENCOUNTER — OFFICE VISIT (OUTPATIENT)
Dept: FAMILY MEDICINE | Facility: CLINIC | Age: 80
End: 2020-03-17
Payer: MEDICARE

## 2020-03-17 VITALS
WEIGHT: 112 LBS | BODY MASS INDEX: 21.99 KG/M2 | DIASTOLIC BLOOD PRESSURE: 57 MMHG | OXYGEN SATURATION: 93 % | TEMPERATURE: 98 F | RESPIRATION RATE: 14 BRPM | HEART RATE: 64 BPM | SYSTOLIC BLOOD PRESSURE: 115 MMHG | HEIGHT: 60 IN

## 2020-03-17 DIAGNOSIS — E03.9 HYPOTHYROIDISM (ACQUIRED): ICD-10-CM

## 2020-03-17 DIAGNOSIS — F41.1 GAD (GENERALIZED ANXIETY DISORDER): ICD-10-CM

## 2020-03-17 DIAGNOSIS — E03.9 ACQUIRED HYPOTHYROIDISM: ICD-10-CM

## 2020-03-17 DIAGNOSIS — E11.42 TYPE 2 DIABETES MELLITUS WITH DIABETIC POLYNEUROPATHY, WITHOUT LONG-TERM CURRENT USE OF INSULIN: ICD-10-CM

## 2020-03-17 DIAGNOSIS — Z79.899 HIGH RISK MEDICATION USE: ICD-10-CM

## 2020-03-17 DIAGNOSIS — I47.10 PAROXYSMAL SVT (SUPRAVENTRICULAR TACHYCARDIA): ICD-10-CM

## 2020-03-17 DIAGNOSIS — R30.0 DYSURIA: ICD-10-CM

## 2020-03-17 DIAGNOSIS — I10 ESSENTIAL HYPERTENSION: Primary | ICD-10-CM

## 2020-03-17 PROCEDURE — 99214 OFFICE O/P EST MOD 30 MIN: CPT | Mod: 25,S$GLB,, | Performed by: NURSE PRACTITIONER

## 2020-03-17 PROCEDURE — 81002 URINALYSIS NONAUTO W/O SCOPE: CPT | Mod: S$GLB,,, | Performed by: NURSE PRACTITIONER

## 2020-03-17 PROCEDURE — 81002 POCT URINE DIPSTICK WITHOUT MICROSCOPE: ICD-10-PCS | Mod: S$GLB,,, | Performed by: NURSE PRACTITIONER

## 2020-03-17 PROCEDURE — 99214 PR OFFICE/OUTPT VISIT, EST, LEVL IV, 30-39 MIN: ICD-10-PCS | Mod: 25,S$GLB,, | Performed by: NURSE PRACTITIONER

## 2020-03-17 RX ORDER — SULFAMETHOXAZOLE AND TRIMETHOPRIM 800; 160 MG/1; MG/1
1 TABLET ORAL 2 TIMES DAILY
Qty: 6 TABLET | Refills: 0 | Status: SHIPPED | OUTPATIENT
Start: 2020-03-17 | End: 2020-05-06

## 2020-03-17 RX ORDER — DILTIAZEM HYDROCHLORIDE 300 MG/1
300 CAPSULE, COATED, EXTENDED RELEASE ORAL DAILY
COMMUNITY
End: 2021-08-02 | Stop reason: SDUPTHER

## 2020-03-17 RX ORDER — CARVEDILOL 6.25 MG/1
6.25 TABLET ORAL 2 TIMES DAILY WITH MEALS
Qty: 60 TABLET | Refills: 0 | Status: SHIPPED | OUTPATIENT
Start: 2020-03-17 | End: 2020-04-15 | Stop reason: SDUPTHER

## 2020-03-17 RX ORDER — LEVOTHYROXINE SODIUM 100 UG/1
100 TABLET ORAL
COMMUNITY
End: 2020-05-06

## 2020-03-17 NOTE — PROGRESS NOTES
Subjective:       Patient ID: Maliha Jaramillo is a 79 y.o. female.    Chief Complaint: Follow-up (Pt c/o bladder infection, swollen knees, and numbness to feet.)    Hospital follow up for SVT thus cardizem increased to 300 mg and bistolic to 10 mg by ER MD. She can not afford bystolic, bought only 1 weeks worth of medication thus this NP chagned to coreg 6.125mg and will send this note to Dr. Valdivia.  She was encouraged to monitor her home blood pressure and heart rate and her report any side effects of lethargy or vertigo.    She continues to take Xanax twice daily as needed for her generalized anxiety she reports dose effective,  reviewed with no suspicion for abuse or misuse.    She complains of dysuria x2 weeks, burning with urination and frequency denies fever or chills    Past Medical History:   Diagnosis Date    Anxiety 1954    Arthritis 2013    Hyperlipidemia 2012    Hypothyroidism (acquired) 2004    Osteoporosis 07/2019       Past Surgical History:   Procedure Laterality Date    APPENDECTOMY  1948    BREAST BIOPSY Right 1980's    benign    HYSTERECTOMY  1978    TONSILLECTOMY  1945    TOTAL REDUCTION MAMMOPLASTY Bilateral 1987        Social History     Socioeconomic History    Marital status:      Spouse name: Not on file    Number of children: Not on file    Years of education: Not on file    Highest education level: Not on file   Occupational History    Not on file   Social Needs    Financial resource strain: Not on file    Food insecurity:     Worry: Not on file     Inability: Not on file    Transportation needs:     Medical: Not on file     Non-medical: Not on file   Tobacco Use    Smoking status: Former Smoker    Smokeless tobacco: Never Used   Substance and Sexual Activity    Alcohol use: Yes     Comment: Couple of drinks per month    Drug use: No    Sexual activity: Not Currently   Lifestyle    Physical activity:     Days per week: Not on file     Minutes per  session: Not on file    Stress: Not on file   Relationships    Social connections:     Talks on phone: Not on file     Gets together: Not on file     Attends Congregational service: Not on file     Active member of club or organization: Not on file     Attends meetings of clubs or organizations: Not on file     Relationship status: Not on file   Other Topics Concern    Not on file   Social History Narrative    Not on file       Family History   Problem Relation Age of Onset    Cancer Sister         Adenocarcinoma    Stroke Father        Review of patient's allergies indicates:   Allergen Reactions    Flu vaccine [id number](5 yr up) Anaphylaxis     Patient's left side of the face is severely swollen on the left side then became unresponsive.     Pneumococcal vaccine Anaphylaxis     Patient's left side of the face is severely swollen on the left side then became unresponsive. Had flu shot same day.     Ace inhibitors Other (See Comments)     cough    Ciprofloxacin Rash          Current Outpatient Medications:     alendronate (FOSAMAX) 70 MG tablet, Take 1 tablet (70 mg total) by mouth every 7 days., Disp: 4 tablet, Rfl: 11    ALPRAZolam (XANAX) 0.5 MG tablet, Take 1 tablet (0.5 mg total) by mouth 2 (two) times daily as needed for Anxiety., Disp: 60 tablet, Rfl: 2    aspirin 325 MG tablet, Take 325 mg by mouth once daily., Disp: , Rfl:     diltiaZEM (CARDIZEM CD) 300 MG 24 hr capsule, Take 300 mg by mouth once daily., Disp: , Rfl:     estradiol (ESTRACE) 0.01 % (0.1 mg/gram) vaginal cream, Place 1 g vaginally twice a week., Disp: 46 g, Rfl: 3    glipiZIDE (GLUCOTROL) 10 MG tablet, TAKE 1 TABLET BY MOUTH TWICE DAILY BEFORE MEALS, Disp: 180 tablet, Rfl: 3    imipramine (TOFRANIL) 50 MG tablet, Take 1 tablet (50 mg total) by mouth every evening., Disp: 90 tablet, Rfl: 1    levothyroxine (SYNTHROID) 100 MCG tablet, Take 100 mcg by mouth before breakfast., Disp: , Rfl:     liraglutide 0.6 mg/0.1 mL, 18 mg/3 mL,  subq PNIJ (VICTOZA 2-TESFAYE) 0.6 mg/0.1 mL (18 mg/3 mL) PnIj, 0.6 mg sq qd for 1 week then increase weekly by 0.6 mg to max dose of 1.8 mg daily. Call for refill, Disp: 6 mL, Rfl: 0    nebivoloL (BYSTOLIC) 5 MG Tab, Take 2 tablets (10 mg total) by mouth once daily., Disp: 60 tablet, Rfl: 11    pravastatin (PRAVACHOL) 10 MG tablet, TAKE 1 TABLET BY MOUTH ONCE DAILY, Disp: 90 tablet, Rfl: 3    pregabalin (LYRICA) 25 MG capsule, Take 1 capsule (25 mg total) by mouth 2 (two) times daily., Disp: 60 capsule, Rfl: 5    valsartan (DIOVAN) 40 MG tablet, Take 1 tablet (40 mg total) by mouth once daily., Disp: 90 tablet, Rfl: 3    carvediloL (COREG) 6.25 MG tablet, Take 1 tablet (6.25 mg total) by mouth 2 (two) times daily with meals. Can not afford bystolic, Disp: 60 tablet, Rfl: 0    sulfamethoxazole-trimethoprim 800-160mg (BACTRIM DS) 800-160 mg Tab, Take 1 tablet by mouth 2 (two) times daily., Disp: 6 tablet, Rfl: 0    HPI  Review of Systems   Constitutional: Negative.    HENT: Negative.    Eyes: Negative.    Respiratory: Negative.    Cardiovascular: Negative.    Gastrointestinal: Negative.    Endocrine: Negative.    Genitourinary: Positive for dysuria.   Musculoskeletal: Negative.    Skin: Negative.    Allergic/Immunologic: Negative.    Neurological: Negative.    Hematological: Negative.    Psychiatric/Behavioral: Negative.        Objective:      Physical Exam   Constitutional: She is oriented to person, place, and time. She appears well-developed and well-nourished.   HENT:   Head: Normocephalic and atraumatic.   Mouth/Throat: Oropharynx is clear and moist.   Eyes: Pupils are equal, round, and reactive to light. Conjunctivae are normal. No scleral icterus.   Neck: Normal range of motion. Neck supple. No thyromegaly present.   Cardiovascular: Normal rate, regular rhythm and normal heart sounds.   No murmur heard.  Pulmonary/Chest: Effort normal and breath sounds normal. No respiratory distress. She has no wheezes. She  has no rales.   Abdominal: Soft. Bowel sounds are normal. She exhibits no distension. There is no tenderness.   Musculoskeletal: Normal range of motion. She exhibits no edema.   Neurological: She is alert and oriented to person, place, and time. No sensory deficit. She exhibits normal muscle tone. Coordination abnormal.   slow cautious gait   Skin: Skin is warm and dry. Capillary refill takes less than 2 seconds. No rash noted.   Psychiatric: She has a normal mood and affect. Her behavior is normal. Judgment and thought content normal.   Nursing note and vitals reviewed.      Assessment:       1. Essential hypertension    2. Paroxysmal SVT (supraventricular tachycardia)    3. Dysuria    4. Type 2 diabetes mellitus with diabetic polyneuropathy, without long-term current use of insulin    5. Acquired hypothyroidism    6. ROSIBEL (generalized anxiety disorder)    7. High risk medication use    8. Hypothyroidism (acquired)        Plan:     1.  Fax this note to Dr. mcneil  2.  Fasting labs prior to next appointment   3.  Bactrim for 3 days until urine culture results     ----------------------------------------------  It was brought to my attention the urine dipstick was done yet their was not enough urine for a culture.    Essential hypertension  -     Hemoglobin A1c; Future; Expected date: 03/17/2020  -     Lipid panel; Future; Expected date: 03/17/2020  -     CBC auto differential; Future; Expected date: 03/17/2020  -     Comprehensive metabolic panel; Future; Expected date: 03/17/2020  -     TSH; Future; Expected date: 03/17/2020  -     T4, free; Future; Expected date: 03/17/2020  -     Vitamin D; Future; Expected date: 03/17/2020    Paroxysmal SVT (supraventricular tachycardia)  -     Hemoglobin A1c; Future; Expected date: 03/17/2020  -     Lipid panel; Future; Expected date: 03/17/2020  -     CBC auto differential; Future; Expected date: 03/17/2020  -     Comprehensive metabolic panel; Future; Expected date:  03/17/2020  -     TSH; Future; Expected date: 03/17/2020  -     T4, free; Future; Expected date: 03/17/2020  -     Vitamin D; Future; Expected date: 03/17/2020    Dysuria  -     POCT urine dipstick without microscope  -     Urine culture  -     sulfamethoxazole-trimethoprim 800-160mg (BACTRIM DS) 800-160 mg Tab; Take 1 tablet by mouth 2 (two) times daily.  Dispense: 6 tablet; Refill: 0    Type 2 diabetes mellitus with diabetic polyneuropathy, without long-term current use of insulin  -     Hemoglobin A1c; Future; Expected date: 03/17/2020  -     Lipid panel; Future; Expected date: 03/17/2020  -     CBC auto differential; Future; Expected date: 03/17/2020  -     Comprehensive metabolic panel; Future; Expected date: 03/17/2020  -     TSH; Future; Expected date: 03/17/2020  -     T4, free; Future; Expected date: 03/17/2020  -     Vitamin D; Future; Expected date: 03/17/2020    Acquired hypothyroidism  -     Hemoglobin A1c; Future; Expected date: 03/17/2020  -     Lipid panel; Future; Expected date: 03/17/2020  -     CBC auto differential; Future; Expected date: 03/17/2020  -     Comprehensive metabolic panel; Future; Expected date: 03/17/2020  -     TSH; Future; Expected date: 03/17/2020  -     T4, free; Future; Expected date: 03/17/2020  -     Vitamin D; Future; Expected date: 03/17/2020    ROSIBEL (generalized anxiety disorder)    High risk medication use  -     Hemoglobin A1c; Future; Expected date: 03/17/2020  -     Lipid panel; Future; Expected date: 03/17/2020  -     CBC auto differential; Future; Expected date: 03/17/2020  -     Comprehensive metabolic panel; Future; Expected date: 03/17/2020  -     TSH; Future; Expected date: 03/17/2020  -     T4, free; Future; Expected date: 03/17/2020  -     Vitamin D; Future; Expected date: 03/17/2020    Hypothyroidism (acquired)    Other orders  -     carvediloL (COREG) 6.25 MG tablet; Take 1 tablet (6.25 mg total) by mouth 2 (two) times daily with meals. Can not afford  bystolic  Dispense: 60 tablet; Refill: 0        Risks, benefits, and side effects were discussed with the patient. All questions were answered to the fullest satisfaction of the patient, and pt verbalized understanding and agreement to treatment plan. Pt was to call with any new or worsening symptoms, or present to the ER.

## 2020-03-19 LAB
BILIRUB SERPL-MCNC: NEGATIVE MG/DL
BLOOD URINE, POC: ABNORMAL
COLOR, POC UA: ABNORMAL
GLUCOSE UR QL STRIP: NEGATIVE
KETONES UR QL STRIP: NEGATIVE
LEUKOCYTE ESTERASE URINE, POC: POSITIVE
NITRITE, POC UA: ABNORMAL
PH, POC UA: ABNORMAL
PROTEIN, POC: NEGATIVE
SPECIFIC GRAVITY, POC UA: 6
UROBILINOGEN, POC UA: NORMAL

## 2020-04-15 RX ORDER — CARVEDILOL 6.25 MG/1
6.25 TABLET ORAL 2 TIMES DAILY WITH MEALS
Qty: 180 TABLET | Refills: 1 | Status: SHIPPED | OUTPATIENT
Start: 2020-04-15 | End: 2021-07-12

## 2020-04-15 RX ORDER — CARVEDILOL 6.25 MG/1
6.25 TABLET ORAL 2 TIMES DAILY WITH MEALS
Qty: 60 TABLET | Refills: 0 | Status: SHIPPED | OUTPATIENT
Start: 2020-04-15 | End: 2020-04-15 | Stop reason: SDUPTHER

## 2020-04-15 RX ORDER — CARVEDILOL 6.25 MG/1
6.25 TABLET ORAL 2 TIMES DAILY WITH MEALS
Qty: 180 TABLET | Refills: 3 | OUTPATIENT
Start: 2020-04-15

## 2020-04-15 NOTE — TELEPHONE ENCOUNTER
Request received for Coreg refill. Our MAR indicates patient is also taking Bystolic, these are both beta blockers. Please telephone patient and ask which she is taking? ty

## 2020-04-23 ENCOUNTER — PATIENT MESSAGE (OUTPATIENT)
Dept: FAMILY MEDICINE | Facility: CLINIC | Age: 80
End: 2020-04-23

## 2020-04-30 ENCOUNTER — LAB VISIT (OUTPATIENT)
Dept: LAB | Facility: HOSPITAL | Age: 80
End: 2020-04-30
Attending: NURSE PRACTITIONER
Payer: MEDICARE

## 2020-04-30 DIAGNOSIS — E03.9 ACQUIRED HYPOTHYROIDISM: ICD-10-CM

## 2020-04-30 DIAGNOSIS — I10 ESSENTIAL HYPERTENSION: ICD-10-CM

## 2020-04-30 DIAGNOSIS — E55.9 VITAMIN D DEFICIENCY: Primary | ICD-10-CM

## 2020-04-30 DIAGNOSIS — E11.42 TYPE 2 DIABETES MELLITUS WITH DIABETIC POLYNEUROPATHY, WITHOUT LONG-TERM CURRENT USE OF INSULIN: ICD-10-CM

## 2020-04-30 DIAGNOSIS — I47.10 PAROXYSMAL SVT (SUPRAVENTRICULAR TACHYCARDIA): ICD-10-CM

## 2020-04-30 DIAGNOSIS — Z79.899 HIGH RISK MEDICATION USE: ICD-10-CM

## 2020-04-30 LAB
25(OH)D3+25(OH)D2 SERPL-MCNC: 26 NG/ML (ref 30–96)
ALBUMIN SERPL BCP-MCNC: 3.8 G/DL (ref 3.5–5.2)
ALP SERPL-CCNC: 120 U/L (ref 55–135)
ALT SERPL W/O P-5'-P-CCNC: 38 U/L (ref 10–44)
ANION GAP SERPL CALC-SCNC: 10 MMOL/L (ref 8–16)
AST SERPL-CCNC: 51 U/L (ref 10–40)
BASOPHILS # BLD AUTO: 0.08 K/UL (ref 0–0.2)
BASOPHILS NFR BLD: 0.9 % (ref 0–1.9)
BILIRUB SERPL-MCNC: 0.7 MG/DL (ref 0.1–1)
BUN SERPL-MCNC: 16 MG/DL (ref 8–23)
CALCIUM SERPL-MCNC: 8.7 MG/DL (ref 8.7–10.5)
CHLORIDE SERPL-SCNC: 102 MMOL/L (ref 95–110)
CHOLEST SERPL-MCNC: 232 MG/DL (ref 120–199)
CHOLEST/HDLC SERPL: 6.1 {RATIO} (ref 2–5)
CO2 SERPL-SCNC: 22 MMOL/L (ref 23–29)
CREAT SERPL-MCNC: 1.3 MG/DL (ref 0.5–1.4)
DIFFERENTIAL METHOD: ABNORMAL
EOSINOPHIL # BLD AUTO: 0.3 K/UL (ref 0–0.5)
EOSINOPHIL NFR BLD: 3 % (ref 0–8)
ERYTHROCYTE [DISTWIDTH] IN BLOOD BY AUTOMATED COUNT: 12.6 % (ref 11.5–14.5)
EST. GFR  (AFRICAN AMERICAN): 44.5 ML/MIN/1.73 M^2
EST. GFR  (NON AFRICAN AMERICAN): 38.6 ML/MIN/1.73 M^2
ESTIMATED AVG GLUCOSE: 200 MG/DL (ref 68–131)
GLUCOSE SERPL-MCNC: 160 MG/DL (ref 70–110)
HBA1C MFR BLD HPLC: 8.6 % (ref 4.5–6.2)
HCT VFR BLD AUTO: 32.7 % (ref 37–48.5)
HDLC SERPL-MCNC: 38 MG/DL (ref 40–75)
HDLC SERPL: 16.4 % (ref 20–50)
HGB BLD-MCNC: 10.6 G/DL (ref 12–16)
IMM GRANULOCYTES # BLD AUTO: 0.03 K/UL (ref 0–0.04)
IMM GRANULOCYTES NFR BLD AUTO: 0.4 % (ref 0–0.5)
LDLC SERPL CALC-MCNC: 160.4 MG/DL (ref 63–159)
LYMPHOCYTES # BLD AUTO: 3.5 K/UL (ref 1–4.8)
LYMPHOCYTES NFR BLD: 41.3 % (ref 18–48)
MCH RBC QN AUTO: 31.1 PG (ref 27–31)
MCHC RBC AUTO-ENTMCNC: 32.4 G/DL (ref 32–36)
MCV RBC AUTO: 96 FL (ref 82–98)
MONOCYTES # BLD AUTO: 0.6 K/UL (ref 0.3–1)
MONOCYTES NFR BLD: 7.5 % (ref 4–15)
NEUTROPHILS # BLD AUTO: 4 K/UL (ref 1.8–7.7)
NEUTROPHILS NFR BLD: 46.9 % (ref 38–73)
NONHDLC SERPL-MCNC: 194 MG/DL
NRBC BLD-RTO: 0 /100 WBC
PLATELET # BLD AUTO: 232 K/UL (ref 150–350)
PMV BLD AUTO: 10.5 FL (ref 9.2–12.9)
POTASSIUM SERPL-SCNC: 4.1 MMOL/L (ref 3.5–5.1)
PROT SERPL-MCNC: 7.6 G/DL (ref 6–8.4)
RBC # BLD AUTO: 3.41 M/UL (ref 4–5.4)
SODIUM SERPL-SCNC: 134 MMOL/L (ref 136–145)
T4 FREE SERPL-MCNC: 1.76 NG/DL (ref 0.71–1.51)
TRIGL SERPL-MCNC: 168 MG/DL (ref 30–150)
TSH SERPL DL<=0.005 MIU/L-ACNC: 0.15 UIU/ML (ref 0.34–5.6)
WBC # BLD AUTO: 8.55 K/UL (ref 3.9–12.7)

## 2020-04-30 PROCEDURE — 84443 ASSAY THYROID STIM HORMONE: CPT

## 2020-04-30 PROCEDURE — 80061 LIPID PANEL: CPT

## 2020-04-30 PROCEDURE — 84439 ASSAY OF FREE THYROXINE: CPT

## 2020-04-30 PROCEDURE — 85025 COMPLETE CBC W/AUTO DIFF WBC: CPT

## 2020-04-30 PROCEDURE — 82306 VITAMIN D 25 HYDROXY: CPT

## 2020-04-30 PROCEDURE — 36415 COLL VENOUS BLD VENIPUNCTURE: CPT

## 2020-04-30 PROCEDURE — 80053 COMPREHEN METABOLIC PANEL: CPT

## 2020-04-30 PROCEDURE — 83036 HEMOGLOBIN GLYCOSYLATED A1C: CPT

## 2020-05-02 DIAGNOSIS — F41.9 ANXIETY: ICD-10-CM

## 2020-05-04 RX ORDER — ALPRAZOLAM 0.5 MG/1
TABLET ORAL
Qty: 60 TABLET | Refills: 0 | Status: SHIPPED | OUTPATIENT
Start: 2020-05-04 | End: 2020-05-21

## 2020-05-05 ENCOUNTER — PATIENT MESSAGE (OUTPATIENT)
Dept: ADMINISTRATIVE | Facility: HOSPITAL | Age: 80
End: 2020-05-05

## 2020-05-06 ENCOUNTER — TELEPHONE (OUTPATIENT)
Dept: FAMILY MEDICINE | Facility: CLINIC | Age: 80
End: 2020-05-06

## 2020-05-06 DIAGNOSIS — E78.2 MIXED HYPERLIPIDEMIA: ICD-10-CM

## 2020-05-06 DIAGNOSIS — E03.9 ACQUIRED HYPOTHYROIDISM: Primary | ICD-10-CM

## 2020-05-06 RX ORDER — LEVOTHYROXINE SODIUM 88 UG/1
88 TABLET ORAL
Qty: 90 TABLET | Refills: 0 | Status: SHIPPED | OUTPATIENT
Start: 2020-05-06 | End: 2020-11-13

## 2020-05-06 RX ORDER — ASPIRIN 325 MG
50000 TABLET, DELAYED RELEASE (ENTERIC COATED) ORAL WEEKLY
Qty: 12 CAPSULE | Refills: 1 | COMMUNITY
Start: 2020-05-06 | End: 2020-05-14 | Stop reason: SDUPTHER

## 2020-05-06 RX ORDER — ATORVASTATIN CALCIUM 40 MG/1
40 TABLET, FILM COATED ORAL DAILY
Qty: 90 TABLET | Refills: 3 | Status: SHIPPED | OUTPATIENT
Start: 2020-05-06 | End: 2021-07-12 | Stop reason: SDUPTHER

## 2020-05-13 ENCOUNTER — OFFICE VISIT (OUTPATIENT)
Dept: FAMILY MEDICINE | Facility: CLINIC | Age: 80
End: 2020-05-13
Payer: MEDICARE

## 2020-05-13 VITALS
HEIGHT: 60 IN | TEMPERATURE: 98 F | RESPIRATION RATE: 18 BRPM | BODY MASS INDEX: 22.31 KG/M2 | HEART RATE: 83 BPM | SYSTOLIC BLOOD PRESSURE: 136 MMHG | DIASTOLIC BLOOD PRESSURE: 79 MMHG | OXYGEN SATURATION: 98 % | WEIGHT: 113.63 LBS

## 2020-05-13 DIAGNOSIS — E03.9 ACQUIRED HYPOTHYROIDISM: ICD-10-CM

## 2020-05-13 DIAGNOSIS — R13.10 DYSPHAGIA, UNSPECIFIED TYPE: ICD-10-CM

## 2020-05-13 DIAGNOSIS — E55.9 VITAMIN D DEFICIENCY: ICD-10-CM

## 2020-05-13 DIAGNOSIS — I47.10 PAROXYSMAL SVT (SUPRAVENTRICULAR TACHYCARDIA): ICD-10-CM

## 2020-05-13 DIAGNOSIS — R39.9 UTI SYMPTOMS: Primary | ICD-10-CM

## 2020-05-13 DIAGNOSIS — F41.1 GAD (GENERALIZED ANXIETY DISORDER): ICD-10-CM

## 2020-05-13 DIAGNOSIS — R53.1 GENERALIZED WEAKNESS: ICD-10-CM

## 2020-05-13 LAB
BILIRUB SERPL-MCNC: ABNORMAL MG/DL
BLOOD URINE, POC: ABNORMAL
COLOR, POC UA: ABNORMAL
GLUCOSE UR QL STRIP: 100
KETONES UR QL STRIP: ABNORMAL
LEUKOCYTE ESTERASE URINE, POC: ABNORMAL
NITRITE, POC UA: POSITIVE
PH, POC UA: 5
PROTEIN, POC: ABNORMAL
SPECIFIC GRAVITY, POC UA: 1.02
UROBILINOGEN, POC UA: NORMAL

## 2020-05-13 PROCEDURE — 99214 PR OFFICE/OUTPT VISIT, EST, LEVL IV, 30-39 MIN: ICD-10-PCS | Mod: 25,S$GLB,, | Performed by: NURSE PRACTITIONER

## 2020-05-13 PROCEDURE — 87186 SC STD MICRODIL/AGAR DIL: CPT

## 2020-05-13 PROCEDURE — 87086 URINE CULTURE/COLONY COUNT: CPT

## 2020-05-13 PROCEDURE — 81002 URINALYSIS NONAUTO W/O SCOPE: CPT | Mod: S$GLB,,, | Performed by: NURSE PRACTITIONER

## 2020-05-13 PROCEDURE — 87088 URINE BACTERIA CULTURE: CPT

## 2020-05-13 PROCEDURE — 81002 POCT URINE DIPSTICK WITHOUT MICROSCOPE: ICD-10-PCS | Mod: S$GLB,,, | Performed by: NURSE PRACTITIONER

## 2020-05-13 PROCEDURE — 87077 CULTURE AEROBIC IDENTIFY: CPT

## 2020-05-13 PROCEDURE — 99214 OFFICE O/P EST MOD 30 MIN: CPT | Mod: 25,S$GLB,, | Performed by: NURSE PRACTITIONER

## 2020-05-13 RX ORDER — ASPIRIN 325 MG
50000 TABLET, DELAYED RELEASE (ENTERIC COATED) ORAL WEEKLY
Qty: 12 CAPSULE | Refills: 1 | Status: SHIPPED | OUTPATIENT
Start: 2020-05-13 | End: 2020-05-14

## 2020-05-13 RX ORDER — LEVOFLOXACIN 500 MG/1
500 TABLET, FILM COATED ORAL DAILY
Qty: 7 TABLET | Refills: 0 | Status: ON HOLD | OUTPATIENT
Start: 2020-05-13 | End: 2020-06-25

## 2020-05-13 RX ORDER — HYDRALAZINE HYDROCHLORIDE 25 MG/1
TABLET, FILM COATED ORAL
COMMUNITY
Start: 2020-04-20 | End: 2021-07-12

## 2020-05-13 NOTE — PROGRESS NOTES
Subjective:       Patient ID: Maliha Jaramillo is a 79 y.o. female.    Chief Complaint: Follow-up and Cystitis    The patient is an 79-year-old female that presents with her son with complaints of weakness requesting [facility] to start in June.  She has an upcoming appt with Joby ESCALANTE- 5/28/2020 and Dr. Valdivia 5/27/2020. She c/o UTI symptoms -burning, frequency, urgency wearing adult briefs x 3 weeks.  On 03/17 patient was treated for 3 days for UTI, culture to be done today. C/o itchy red rash on back  Right knee pain worsened after gardening. Has a home monitor with 150 HR x 1 event yet- in general 85 BPM. On 3/11/2020 she was seen at [facility] ER for SVT.     Dysphagia, throwing up medications and esophageal spasms x 6 weeks-refer to [facility] for evaluation for dilation. Labs discussed with multiple abnormalities. A1c worsened with pt agreeable to weekly GLP-1 yet dietary changes discussed. She admits to not taking her statin, values have all increased. Kidney function is consistent and her anemia is stable. Synthroid was lowered to 88 mcg and Vit D3 was ordered. NP corrected MAR. Weight is consistent and VSS.       She continues to take Xanax twice daily as needed for her generalized anxiety she reports dose effective,  reviewed with no suspicion for abuse or misuse.    Past Medical History:   Diagnosis Date    Anxiety 1954    Arthritis 2013    Hyperlipidemia 2012    Hypothyroidism (acquired) 2004    Osteoporosis 07/2019       Past Surgical History:   Procedure Laterality Date    APPENDECTOMY  1948    BREAST BIOPSY Right 1980's    benign    HYSTERECTOMY  1978    TONSILLECTOMY  1945    TOTAL REDUCTION MAMMOPLASTY Bilateral 1987        Social History     Socioeconomic History    Marital status:      Spouse name: Not on file    Number of children: Not on file    Years of education: Not on file    Highest education level: Not on file   Occupational History    Not on file   Social  Needs    Financial resource strain: Not on file    Food insecurity:     Worry: Not on file     Inability: Not on file    Transportation needs:     Medical: Not on file     Non-medical: Not on file   Tobacco Use    Smoking status: Former Smoker    Smokeless tobacco: Never Used   Substance and Sexual Activity    Alcohol use: Yes     Comment: Couple of drinks per month    Drug use: No    Sexual activity: Not Currently   Lifestyle    Physical activity:     Days per week: Not on file     Minutes per session: Not on file    Stress: Not on file   Relationships    Social connections:     Talks on phone: Not on file     Gets together: Not on file     Attends Muslim service: Not on file     Active member of club or organization: Not on file     Attends meetings of clubs or organizations: Not on file     Relationship status: Not on file   Other Topics Concern    Not on file   Social History Narrative    Not on file       Family History   Problem Relation Age of Onset    Cancer Sister         Adenocarcinoma    Stroke Father        Review of patient's allergies indicates:   Allergen Reactions    Flu vaccine [id number](5 yr up) Anaphylaxis     Patient's left side of the face is severely swollen on the left side then became unresponsive.     Pneumococcal vaccine Anaphylaxis     Patient's left side of the face is severely swollen on the left side then became unresponsive. Had flu shot same day.     Ace inhibitors Other (See Comments)     cough          Current Outpatient Medications:     alendronate (FOSAMAX) 70 MG tablet, Take 1 tablet (70 mg total) by mouth every 7 days., Disp: 4 tablet, Rfl: 11    ALPRAZolam (XANAX) 0.5 MG tablet, Take 1 tablet by mouth twice daily as needed for anxiety, Disp: 60 tablet, Rfl: 0    aspirin 325 MG tablet, Take 325 mg by mouth once daily., Disp: , Rfl:     atorvastatin (LIPITOR) 40 MG tablet, Take 1 tablet (40 mg total) by mouth once daily., Disp: 90 tablet, Rfl: 3     "carvediloL (COREG) 6.25 MG tablet, Take 1 tablet (6.25 mg total) by mouth 2 (two) times daily with meals. Can not afford bystolic, Disp: 180 tablet, Rfl: 1    cholecalciferol, vitamin D3, 1,250 mcg (50,000 unit) capsule, Take 1 capsule (50,000 Units total) by mouth once a week., Disp: 12 capsule, Rfl: 1    diltiaZEM (CARDIZEM CD) 300 MG 24 hr capsule, Take 300 mg by mouth once daily., Disp: , Rfl:     estradiol (ESTRACE) 0.01 % (0.1 mg/gram) vaginal cream, Place 1 g vaginally twice a week., Disp: 46 g, Rfl: 3    glipiZIDE (GLUCOTROL) 10 MG tablet, TAKE 1 TABLET BY MOUTH TWICE DAILY BEFORE MEALS, Disp: 180 tablet, Rfl: 3    hydrALAZINE (APRESOLINE) 25 MG tablet, , Disp: , Rfl:     imipramine (TOFRANIL) 50 MG tablet, Take 1 tablet (50 mg total) by mouth every evening., Disp: 90 tablet, Rfl: 1    levoFLOXacin (LEVAQUIN) 500 MG tablet, Take 1 tablet (500 mg total) by mouth once daily., Disp: 7 tablet, Rfl: 0    levothyroxine (SYNTHROID) 88 MCG tablet, Take 1 tablet (88 mcg total) by mouth before breakfast., Disp: 90 tablet, Rfl: 0    pregabalin (LYRICA) 25 MG capsule, Take 1 capsule (25 mg total) by mouth 2 (two) times daily., Disp: 60 capsule, Rfl: 5    semaglutide (OZEMPIC) 1 mg/dose (2 mg/1.5 mL) PnIj, Inject 0.75 mLs into the skin once a week., Disp: 6 Syringe, Rfl: 3    valsartan (DIOVAN) 40 MG tablet, Take 1 tablet (40 mg total) by mouth once daily., Disp: 90 tablet, Rfl: 3    HPI  Review of Systems   Constitutional: Negative.    HENT: Negative.    Eyes: Negative.    Respiratory: Negative.    Cardiovascular: Negative.    Gastrointestinal: Negative.         "Can't swallow and I throw up my pills"   Endocrine: Negative.    Genitourinary: Positive for frequency, pelvic pain and urgency.   Musculoskeletal: Negative.    Skin: Positive for rash.   Allergic/Immunologic: Negative.    Neurological: Positive for weakness.        Unsteady gait   Hematological: Negative.    Psychiatric/Behavioral: Negative.      "   Objective:      Physical Exam   Constitutional: She is oriented to person, place, and time. She appears well-developed and well-nourished.   HENT:   Head: Normocephalic.   Eyes: Pupils are equal, round, and reactive to light. Conjunctivae are normal.   Neck: Normal range of motion. Neck supple. No thyromegaly present.   Cardiovascular: Normal rate, regular rhythm and normal heart sounds.   No murmur heard.  Pulmonary/Chest: Effort normal and breath sounds normal. She has no wheezes. She has no rales.   Abdominal: Soft. Bowel sounds are normal. She exhibits no distension. There is tenderness.   Musculoskeletal: Normal range of motion. She exhibits no edema or deformity.   Neurological: She is alert and oriented to person, place, and time. Coordination abnormal.   Skin: Skin is warm and dry. Capillary refill takes less than 2 seconds. No rash noted.   Trace papular colorless rash felt on bilateral shoulders.   Psychiatric: She has a normal mood and affect. Her behavior is normal. Judgment and thought content normal.   Vitals reviewed.      Assessment:       1. UTI symptoms    2. Dysphagia, unspecified type    3. Vitamin D deficiency    4. Paroxysmal SVT (supraventricular tachycardia)    5. ROSIBEL (generalized anxiety disorder)    6. Acquired hypothyroidism    7. Uncontrolled type 2 DM with peripheral circulatory disorder    8. Generalized weakness        Plan:       1.  Refer to general surgery for evaluation of esophageal stricture  2.  UA and urine culture, Levaquin x7 days for presumed UTI  3.  [facility] to [facility] per patient preference to evaluate and treat  4.  This note to be faxed to Dr. mcneil and Dr. Hemphill in addition to all labs  5. RTC 3 mo for routine follow up.    UTI symptoms  -     POCT URINE DIPSTICK WITHOUT MICROSCOPE  -     Urine culture  -     levoFLOXacin (LEVAQUIN) 500 MG tablet; Take 1 tablet (500 mg total) by mouth once daily.  Dispense: 7 tablet; Refill: 0    Dysphagia, unspecified  type  -     Ambulatory referral/consult to General Surgery; Future; Expected date: 05/20/2020    Vitamin D deficiency  -     Discontinue: cholecalciferol, vitamin D3, 1,250 mcg (50,000 unit) capsule; Take 1 capsule (50,000 Units total) by mouth once a week.  Dispense: 12 capsule; Refill: 1  -     cholecalciferol, vitamin D3, 1,250 mcg (50,000 unit) capsule; Take 1 capsule (50,000 Units total) by mouth once a week.  Dispense: 12 capsule; Refill: 1    Paroxysmal SVT (supraventricular tachycardia)  -     Ambulatory referral/consult to Physical/Occupational Therapy; Future; Expected date: 05/21/2020    ROSIBEL (generalized anxiety disorder)  -     Ambulatory referral/consult to Physical/Occupational Therapy; Future; Expected date: 05/21/2020    Acquired hypothyroidism    Uncontrolled type 2 DM with peripheral circulatory disorder    Generalized weakness  -     Ambulatory referral/consult to Physical/Occupational Therapy; Future; Expected date: 05/21/2020        Risks, benefits, and side effects were discussed with the patient. All questions were answered to the fullest satisfaction of the patient, and pt verbalized understanding and agreement to treatment plan. Pt was to call with any new or worsening symptoms, or present to the ER.

## 2020-05-14 ENCOUNTER — TELEPHONE (OUTPATIENT)
Dept: FAMILY MEDICINE | Facility: CLINIC | Age: 80
End: 2020-05-14

## 2020-05-14 RX ORDER — ASPIRIN 325 MG
50000 TABLET, DELAYED RELEASE (ENTERIC COATED) ORAL WEEKLY
Qty: 12 CAPSULE | Refills: 1 | Status: SHIPPED | OUTPATIENT
Start: 2020-05-14 | End: 2020-11-10

## 2020-05-14 NOTE — TELEPHONE ENCOUNTER
Please fax last office note and all 4/30/2020 labs to both Dr. Valdivia (cardiologist) and Dr. Hemphill (Hem/Onc) both at [facility].    Also, please fax PT referral to [facility] on [address] per pt request facility.   ty

## 2020-05-15 NOTE — TELEPHONE ENCOUNTER
"Faxed referral---   Joby  Confirmation  5/15/2020 8:26:43 AM Transmission Record  Sent to [id number] with remote ID ""  Result: (0/339;0/0) Success  Page record: 1 - 28    [facility]  Confirmation  5/15/2020 8:30:07 AM Transmission Record  Sent to [id number] with remote ID "[id number]"  Result: (0/339;0/0) Success  Page record: 1 - 29  Elapsed time: 08:37 on channel 5    Dr Payment  Confirmation  5/15/2020 8:28:30 AM Transmission Record  Sent to [id number] with remote ID "[id number]"  Result: (0/339;0/0) Success  Page record: 1 - 28    "

## 2020-05-16 LAB — BACTERIA UR CULT: ABNORMAL

## 2020-05-18 ENCOUNTER — PATIENT MESSAGE (OUTPATIENT)
Dept: FAMILY MEDICINE | Facility: CLINIC | Age: 80
End: 2020-05-18

## 2020-05-20 ENCOUNTER — PATIENT MESSAGE (OUTPATIENT)
Dept: ADMINISTRATIVE | Facility: HOSPITAL | Age: 80
End: 2020-05-20

## 2020-05-20 ENCOUNTER — PATIENT MESSAGE (OUTPATIENT)
Dept: FAMILY MEDICINE | Facility: CLINIC | Age: 80
End: 2020-05-20

## 2020-05-20 DIAGNOSIS — F32.89 OTHER DEPRESSION: ICD-10-CM

## 2020-05-20 DIAGNOSIS — E11.9 DIABETES MELLITUS WITHOUT COMPLICATION: ICD-10-CM

## 2020-05-20 DIAGNOSIS — F41.9 ANXIETY: ICD-10-CM

## 2020-05-21 RX ORDER — IMIPRAMINE HYDROCHLORIDE 50 MG/1
TABLET, FILM COATED ORAL
Qty: 90 TABLET | Refills: 0 | Status: SHIPPED | OUTPATIENT
Start: 2020-05-21 | End: 2020-09-08

## 2020-05-21 RX ORDER — ALPRAZOLAM 0.5 MG/1
TABLET ORAL
Qty: 60 TABLET | Refills: 2 | Status: SHIPPED | OUTPATIENT
Start: 2020-05-21 | End: 2020-08-17 | Stop reason: SDUPTHER

## 2020-05-31 ENCOUNTER — PATIENT OUTREACH (OUTPATIENT)
Dept: ADMINISTRATIVE | Facility: OTHER | Age: 80
End: 2020-05-31

## 2020-06-03 ENCOUNTER — OFFICE VISIT (OUTPATIENT)
Dept: SURGERY | Facility: CLINIC | Age: 80
End: 2020-06-03
Payer: MEDICARE

## 2020-06-03 VITALS
HEIGHT: 60 IN | WEIGHT: 114 LBS | BODY MASS INDEX: 22.38 KG/M2 | RESPIRATION RATE: 16 BRPM | TEMPERATURE: 98 F | DIASTOLIC BLOOD PRESSURE: 68 MMHG | OXYGEN SATURATION: 97 % | HEART RATE: 79 BPM | SYSTOLIC BLOOD PRESSURE: 121 MMHG

## 2020-06-03 DIAGNOSIS — R13.10 DYSPHAGIA, UNSPECIFIED TYPE: ICD-10-CM

## 2020-06-03 PROCEDURE — 99205 PR OFFICE/OUTPT VISIT, NEW, LEVL V, 60-74 MIN: ICD-10-PCS | Mod: S$GLB,,, | Performed by: SURGERY

## 2020-06-03 PROCEDURE — 99205 OFFICE O/P NEW HI 60 MIN: CPT | Mod: S$GLB,,, | Performed by: SURGERY

## 2020-06-03 RX ORDER — SODIUM CHLORIDE, SODIUM LACTATE, POTASSIUM CHLORIDE, CALCIUM CHLORIDE 600; 310; 30; 20 MG/100ML; MG/100ML; MG/100ML; MG/100ML
INJECTION, SOLUTION INTRAVENOUS CONTINUOUS
Status: CANCELLED | OUTPATIENT
Start: 2020-06-03

## 2020-06-03 RX ORDER — LIDOCAINE HYDROCHLORIDE 10 MG/ML
1 INJECTION, SOLUTION EPIDURAL; INFILTRATION; INTRACAUDAL; PERINEURAL ONCE
Status: CANCELLED | OUTPATIENT
Start: 2020-06-03 | End: 2020-06-03

## 2020-06-03 NOTE — LETTER
Deanna 3, 2020      Gladis Mirza, NP  [address]  [address]  [facility] [place] [address]           [facility]  [address] [place] [address]  Phone: [phone]  Fax: [phone]          Patient: Maliha Jaramillo   MR Number: [id number]   YOB: 1940   Date of Visit: 6/3/2020       Dear Gladis Mirza:    Thank you for referring Maliha Jaramillo to me for evaluation. Attached you will find relevant portions of my assessment and plan of care.    If you have questions, please do not hesitate to call me. I look forward to following Maliha Jaramillo along with you.    Sincerely,    Ruben Adame MD    Enclosure  CC:  No Recipients    If you would like to receive this communication electronically, please contact [email or web address] or [phone] to request more information on [employer] access.    For providers and/or their staff who would like to refer a patient to [facility], please contact us through our one-stop-shop provider referral line, [facility], at [phone].    If you feel you have received this communication in error or would no longer like to receive these types of communications, please e-mail [email or web address]

## 2020-06-03 NOTE — PROGRESS NOTES
Subjective:       Patient ID: Maliha Jaramillo     Chief Complaint:  Other (referral for dysphagia)      HPI:  Ms. Jaramillo presents today as a consult from [facility].  She is sent in consultation for esophageal dysphagia.  She has been experiencing difficulty swallowing pills for several months.  The exact duration of symptoms cannot be determined.  No nausea.  No abdominal or chest pain.  No hematemesis.  No fever or chills.  No diarrhea or constipation.  No weight loss.  No aggravating factors.  No triggering issues.  No other associated symptoms.  No alleviating factors.      Allergies & Meds:  Review of patient's allergies indicates:   Allergen Reactions    Flu vaccine [id number](5 yr up) Anaphylaxis     Patient's left side of the face is severely swollen on the left side then became unresponsive.     Pneumococcal vaccine Anaphylaxis     Patient's left side of the face is severely swollen on the left side then became unresponsive. Had flu shot same day.     Ace inhibitors Other (See Comments)     cough       Current Outpatient Medications   Medication Sig Dispense Refill    ALPRAZolam (XANAX) 0.5 MG tablet Take 1 tablet by mouth twice daily as needed for anxiety 60 tablet 2    aspirin 325 MG tablet Take 325 mg by mouth once daily.      atorvastatin (LIPITOR) 40 MG tablet Take 1 tablet (40 mg total) by mouth once daily. 90 tablet 3    carvediloL (COREG) 6.25 MG tablet Take 1 tablet (6.25 mg total) by mouth 2 (two) times daily with meals. Can not afford bystolic 180 tablet 1    cholecalciferol, vitamin D3, 1,250 mcg (50,000 unit) capsule Take 1 capsule (50,000 Units total) by mouth once a week. 12 capsule 1    diltiaZEM (CARDIZEM CD) 300 MG 24 hr capsule Take 300 mg by mouth once daily.      estradiol (ESTRACE) 0.01 % (0.1 mg/gram) vaginal cream Place 1 g vaginally twice a week. 46 g 3    glipiZIDE (GLUCOTROL) 10 MG tablet TAKE 1 TABLET BY MOUTH TWICE DAILY BEFORE MEALS 180  tablet 3    hydrALAZINE (APRESOLINE) 25 MG tablet       imipramine (TOFRANIL) 50 MG tablet TAKE 1 TABLET BY MOUTH ONCE DAILY IN THE EVENING 90 tablet 0    levothyroxine (SYNTHROID) 88 MCG tablet Take 1 tablet (88 mcg total) by mouth before breakfast. 90 tablet 0    pregabalin (LYRICA) 25 MG capsule Take 1 capsule (25 mg total) by mouth 2 (two) times daily. 60 capsule 5    valsartan (DIOVAN) 40 MG tablet Take 1 tablet (40 mg total) by mouth once daily. 90 tablet 3    alendronate (FOSAMAX) 70 MG tablet Take 1 tablet (70 mg total) by mouth every 7 days. (Patient not taking: Reported on 6/3/2020) 4 tablet 11    levoFLOXacin (LEVAQUIN) 500 MG tablet Take 1 tablet (500 mg total) by mouth once daily. (Patient not taking: Reported on 6/3/2020) 7 tablet 0    semaglutide (OZEMPIC) 1 mg/dose (2 mg/1.5 mL) PnIj Inject 0.75 mLs into the skin once a week. (Patient not taking: Reported on 6/3/2020) 6 Syringe 3     No current facility-administered medications for this visit.        PMFSHx:  Past Medical History:   Diagnosis Date    Anxiety 1954    Arthritis 2013    Hyperlipidemia 2012    Hypothyroidism (acquired) 2004    Osteoporosis 07/2019       Past Surgical History:   Procedure Laterality Date    APPENDECTOMY  1948    BREAST BIOPSY Right 1980's    benign    HYSTERECTOMY  1978    TONSILLECTOMY  1945    TOTAL REDUCTION MAMMOPLASTY Bilateral 1987       Family History   Problem Relation Age of Onset    Cancer Sister         Adenocarcinoma    Stroke Father        Social History     Tobacco Use    Smoking status: Former Smoker    Smokeless tobacco: Never Used   Substance Use Topics    Alcohol use: Yes     Comment: Couple of drinks per month    Drug use: No       Review of Systems   Constitutional: Negative for appetite change, chills, fatigue, fever and unexpected weight change.   HENT: Negative for congestion, dental problem, ear pain, mouth sores, postnasal drip, rhinorrhea, sore throat, tinnitus and voice  change.    Eyes: Negative for photophobia, pain, discharge and visual disturbance.   Respiratory: Negative for cough, chest tightness, shortness of breath and wheezing.    Cardiovascular: Negative for chest pain, palpitations and leg swelling.   Gastrointestinal: Negative for abdominal pain, blood in stool, constipation, diarrhea, nausea and vomiting.   Endocrine: Negative for cold intolerance, heat intolerance, polydipsia, polyphagia and polyuria.   Genitourinary: Negative for difficulty urinating, dysuria, flank pain, frequency, hematuria and urgency.   Musculoskeletal: Negative for arthralgias, joint swelling and myalgias.   Skin: Negative for color change and rash.   Allergic/Immunologic: Negative for immunocompromised state.   Neurological: Negative for dizziness, tremors, seizures, syncope, speech difficulty, weakness, numbness and headaches.   Hematological: Negative for adenopathy. Does not bruise/bleed easily.   Psychiatric/Behavioral: Negative for agitation, confusion, hallucinations, self-injury and suicidal ideas. The patient is not nervous/anxious.             Physical Exam   Constitutional: She is oriented to person, place, and time. She appears well-developed and well-nourished. She is active.  Non-toxic appearance. No distress.   Body mass index is 22.26 kg/m².     HENT:   Head: Normocephalic and atraumatic.   Right Ear: Hearing and external ear normal. No drainage or tenderness.   Left Ear: Hearing and external ear normal. No drainage or tenderness.   Nose: Nose normal. No rhinorrhea. No epistaxis.   Mouth/Throat: Uvula is midline, oropharynx is clear and moist and mucous membranes are normal. Mucous membranes are not pale, not dry and not cyanotic. No oropharyngeal exudate.   Eyes: Pupils are equal, round, and reactive to light. Conjunctivae and lids are normal. Right eye exhibits no discharge and no exudate. Left eye exhibits no discharge and no exudate. Right conjunctiva is not injected. Right  eye exhibits no nystagmus. Left eye exhibits no nystagmus.   Neck: Trachea normal, full passive range of motion without pain and phonation normal. No JVD present. Carotid bruit is not present. No tracheal deviation present. No thyroid mass and no thyromegaly present.   Cardiovascular: Normal rate, regular rhythm, S1 normal, S2 normal, normal heart sounds and intact distal pulses. PMI is not displaced. Exam reveals no gallop and no friction rub.   No murmur heard.  Pulmonary/Chest: Effort normal and breath sounds normal. No accessory muscle usage. No respiratory distress. She exhibits no mass, no tenderness and no crepitus. Right breast exhibits no inverted nipple, no mass, no nipple discharge, no skin change and no tenderness. Left breast exhibits no inverted nipple, no mass, no nipple discharge, no skin change and no tenderness. Breasts are symmetrical.   Abdominal: Soft. Normal appearance and bowel sounds are normal. She exhibits no distension, no fluid wave, no abdominal bruit and no mass. There is no hepatosplenomegaly. There is no tenderness. There is no rebound, no guarding and negative Beard's sign. No hernia. Hernia confirmed negative in the right inguinal area and confirmed negative in the left inguinal area.   Genitourinary: Rectum normal and vagina normal. There is no rash or lesion on the right labia. There is no rash or lesion on the left labia. Right adnexum displays no mass. Left adnexum displays no mass. No vaginal discharge found.   Musculoskeletal:        Cervical back: Normal.        Thoracic back: Normal.        Lumbar back: Normal.        Right upper arm: Normal.        Left upper arm: Normal.        Right forearm: Normal.        Left forearm: Normal.        Right hand: Normal.        Left hand: Normal.        Right upper leg: Normal.        Left upper leg: Normal.        Right lower leg: Normal.        Left lower leg: Normal.        Right foot: Normal.        Left foot: Normal.    Lymphadenopathy:        Head (right side): No submental, no submandibular and no posterior auricular adenopathy present.        Head (left side): No submental, no submandibular and no posterior auricular adenopathy present.     She has no cervical adenopathy.     She has no axillary adenopathy.        Right: No inguinal and no supraclavicular adenopathy present.        Left: No inguinal and no supraclavicular adenopathy present.   Neurological: She is alert and oriented to person, place, and time. She has normal strength. No cranial nerve deficit or sensory deficit. Coordination and gait normal. GCS eye subscore is 4. GCS verbal subscore is 5. GCS motor subscore is 6.   Skin: Skin is warm, dry and intact. No rash noted. No cyanosis. Nails show no clubbing.   Psychiatric: She has a normal mood and affect. Her speech is normal. Judgment and thought content normal. Her mood appears not anxious. Her affect is not inappropriate. She is not actively hallucinating. She does not exhibit a depressed mood.           Medical Records Review:  Lab results reviewed from 4/30/2020.  CBC showed no evidence of leukocytosis or thrombocytopenia, mild stable normochromic normocytic anemia was noted and unchanged compared with a CBC from 1 year ago.  Electrolytes revealed a mild hyponatremia and hypocarbia, otherwise all electrolytes were in the range of normal.  BUN and creatinine showed no evidence of renal dysfunction.  Glucose was mildly elevated.  Liver profile showed no evidence of hepatocellular disease or biliary obstruction.  TSH and free T4 indicates she is mildly hyperthyroid.  Vitamin-D level shows evidence of mild deficiency.  Hemoglobin A1c was elevated consistent diabetes.  Lipid profile showed a mild mixed hyperlipidemia.    EKG reviewed from 3/11/2020.  Twelve lead tracing showed normal sinus rhythm with a right bundle branch block.    Assessment:       Dysphagia, esophageal phase.  Differential diagnosis includes but  is not limited to esophageal motility disorder, esophageal stricture, esophageal stenosis, esophageal neoplasm, hiatal hernia, gastroesophageal reflux disease, esophageal spasm, esophageal diverticulum, etc. Diagnostic options include but are not limited to second opinion, observation, empiric therapy, radiologic studies, manometry, endoscopy, etc.  Multiple comorbid issues ( hypertension, hyperlipidemia, diabetes, hypothyroidism ) increase the complexity of required perioperative evaluation & management, risks of complications, and likely will increase the difficulty and complexity of postoperative care, etc.  These issues must be factored in to preoperative evaluation and perioperative management.    1. Dysphagia, unspecified type          Plan:     Dysphagia, unspecified type  -     Ambulatory referral/consult to General Surgery  -     Case Request Operating Room: ESOPHAGOGASTRODUODENOSCOPY (EGD)  -     COVID-19 Routine Screening; Future; Expected date: 06/23/2020        Follow up in about 1 month (around 7/3/2020) for routine post-endosocpy visit.    Counseling/Medical Decision Making:  Maliha Jaramillo was counseled on the results of the evaluation thus far and the differential diagnosis as well as the diagnostic and therapeutic options.  Risks, benefits, possible complications, details of, and indications for each option were also discussed.  Diagnoses discussed included but were not limited to: esophagitis, gastritis, ulcer disease, neoplastic disease, GERD, hiatal hernia, angiodysplasias, etc.  Options discussed included but were not limited to: radiologic studies, empiric medical management, observation, second opinion, PillCam, EGD.  Possible complications of EGD discussed included but were not limited to: bleeding, injury to internal organs, infections, endocarditis, incomplete exam, failure to diagnose a cancer or other serious conditions, need for emergency surgery, need for additional operations or  procedures.  Entire conversation held in laymans terms.  All unfamiliar terms defined.  Questions were solicited and answered.  I read the entire consent form to her verbatim.  A copy of the consent form was provided for her review outside the office / hospital prior to the procedure.  [facility] publications pertaining to endoscopy, GERD, and ulcer disease were provided.  At the conclusion of the conversation she voiced complete understanding of all that we discussed, satisfaction that all questions were fully answered, and that she felt fully informed and completely capable of making an informed decision.  She requests and desires to proceed with an EGD.    Total face-to-face encounter time was 60 minutes, 40 minutes spent counseling as outlined/summarized above.    The above order for ambulatory referral/consult to General surgery is in the plan section of my note incorrectly.  This is the order from another provider that precipitated this office visit/encounter.  Currently there is an Epic software error that automatically pulls the referral diagnosis from the requesting provider into the plan section of this encounter note.  This order should be disregarded as part of this note/encounter.    Maliha Jaramillo was instructed that the best way to communicate with providers, clinic, and hospital is throughn the [facility] Epic Patient Portal.  This will expedite care, communication, and permit functions such as reviewing results and scheduling appointments.  This will also help prevent any communication issues that might otherwise occur.  She was given instructions on how to get registered for portal access and how to log on to the portal.  I strongly encouraged her to obtain portal access.  She voiced understanding.

## 2020-06-03 NOTE — H&P
[facility] - General Surgery  General Surgery  H&P      Patient Name: Maliha Jaramillo  MRN: [id number]     Chief Complaint:  I am here to be scoped and have my esophagus stretched      HPI:  Ms. Jaramillo presents today to proceed with EGD and esophageal dilation.  She was initially seen in Surgery Clinic on 6/3/2020 as a consult from Gladis ACOSTA.  She is sent in consultation for esophageal dysphagia.  She has been experiencing difficulty swallowing pills for several months.  The exact duration of symptoms cannot be determined.  No nausea.  No abdominal or chest pain.  No hematemesis.  No fever or chills.  No diarrhea or constipation.  No weight loss.  No aggravating factors.  No triggering issues.  No other associated symptoms.  No alleviating factors.      Allergies & Meds:     Allergen Reactions    Flu vaccine [id number](5 yr up) Anaphylaxis     Patient's left side of the face is severely swollen on the left side then became unresponsive.     Pneumococcal vaccine Anaphylaxis     Patient's left side of the face is severely swollen on the left side then became unresponsive. Had flu shot same day.     Ace inhibitors Other (See Comments)     cough       Current Outpatient Medications   Medication Sig Dispense Refill    ALPRAZolam (XANAX) 0.5 MG tablet Take 1 tablet by mouth twice daily as needed for anxiety 60 tablet 2    aspirin 325 MG tablet Take 325 mg by mouth once daily.      atorvastatin (LIPITOR) 40 MG tablet Take 1 tablet (40 mg total) by mouth once daily. 90 tablet 3    carvediloL (COREG) 6.25 MG tablet Take 1 tablet (6.25 mg total) by mouth 2 (two) times daily with meals. Can not afford bystolic 180 tablet 1    cholecalciferol, vitamin D3, 1,250 mcg (50,000 unit) capsule Take 1 capsule (50,000 Units total) by mouth once a week. 12 capsule 1    diltiaZEM (CARDIZEM CD) 300 MG 24 hr capsule Take 300 mg by mouth once daily.      estradiol (ESTRACE)  0.01 % (0.1 mg/gram) vaginal cream Place 1 g vaginally twice a week. 46 g 3    glipiZIDE (GLUCOTROL) 10 MG tablet TAKE 1 TABLET BY MOUTH TWICE DAILY BEFORE MEALS 180 tablet 3    hydrALAZINE (APRESOLINE) 25 MG tablet       imipramine (TOFRANIL) 50 MG tablet TAKE 1 TABLET BY MOUTH ONCE DAILY IN THE EVENING 90 tablet 0    levothyroxine (SYNTHROID) 88 MCG tablet Take 1 tablet (88 mcg total) by mouth before breakfast. 90 tablet 0    pregabalin (LYRICA) 25 MG capsule Take 1 capsule (25 mg total) by mouth 2 (two) times daily. 60 capsule 5    valsartan (DIOVAN) 40 MG tablet Take 1 tablet (40 mg total) by mouth once daily. 90 tablet 3    alendronate (FOSAMAX) 70 MG tablet Take 1 tablet (70 mg total) by mouth every 7 days. (Patient not taking: Reported on 6/3/2020) 4 tablet 11    levoFLOXacin (LEVAQUIN) 500 MG tablet Take 1 tablet (500 mg total) by mouth once daily. (Patient not taking: Reported on 6/3/2020) 7 tablet 0    semaglutide (OZEMPIC) 1 mg/dose (2 mg/1.5 mL) PnIj Inject 0.75 mLs into the skin once a week. (Patient not taking: Reported on 6/3/2020) 6 Syringe 3         PMFSHx:  Past Medical History:   Diagnosis Date    Anxiety 1954    Arthritis 2013    Hyperlipidemia 2012    Hypothyroidism (acquired) 2004    Osteoporosis 07/2019       Past Surgical History:   Procedure Laterality Date    APPENDECTOMY  1948    BREAST BIOPSY Right 1980's    benign    HYSTERECTOMY  1978    TONSILLECTOMY  1945    TOTAL REDUCTION MAMMOPLASTY Bilateral 1987       Family History   Problem Relation Age of Onset    Cancer Sister         Adenocarcinoma    Stroke Father        Social History     Tobacco Use    Smoking status: Former Smoker    Smokeless tobacco: Never Used   Substance Use Topics    Alcohol use: Yes     Comment: Couple of drinks per month    Drug use: No       Review of Systems   Constitutional: Negative for appetite change, chills, fatigue, fever and unexpected weight change.   HENT: Negative for  congestion, dental problem, ear pain, mouth sores, postnasal drip, rhinorrhea, sore throat, tinnitus and voice change.    Eyes: Negative for photophobia, pain, discharge and visual disturbance.   Respiratory: Negative for cough, chest tightness, shortness of breath and wheezing.    Cardiovascular: Negative for chest pain, palpitations and leg swelling.   Gastrointestinal: Negative for abdominal pain, blood in stool, constipation, diarrhea, nausea and vomiting.   Endocrine: Negative for cold intolerance, heat intolerance, polydipsia, polyphagia and polyuria.   Genitourinary: Negative for difficulty urinating, dysuria, flank pain, frequency, hematuria and urgency.   Musculoskeletal: Negative for arthralgias, joint swelling and myalgias.   Skin: Negative for color change and rash.   Allergic/Immunologic: Negative for immunocompromised state.   Neurological: Negative for dizziness, tremors, seizures, syncope, speech difficulty, weakness, numbness and headaches.   Hematological: Negative for adenopathy. Does not bruise/bleed easily.   Psychiatric/Behavioral: Negative for agitation, confusion, hallucinations, self-injury and suicidal ideas. The patient is not nervous/anxious.             Physical Exam   Constitutional: She is oriented to person, place, and time. She appears well-developed and well-nourished. She is active.  Non-toxic appearance. No distress. Body mass index is 22.26 kg/m².  HENT: Head: Normocephalic and atraumatic.   Right Ear: Hearing and external ear normal. No drainage or tenderness.   Left Ear: Hearing and external ear normal. No drainage or tenderness.   Nose: Nose normal. No rhinorrhea. No epistaxis.   Mouth/Throat: Uvula is midline, oropharynx is clear and moist and mucous membranes are normal. Mucous membranes are not pale, not dry and not cyanotic. No oropharyngeal exudate.   Eyes: Pupils are equal, round, and reactive to light. Conjunctivae and lids are normal. Right eye exhibits no discharge  and no exudate. Left eye exhibits no discharge and no exudate. Right conjunctiva is not injected. Right eye exhibits no nystagmus. Left eye exhibits no nystagmus.   Neck: Trachea normal, full passive range of motion without pain and phonation normal. No JVD present. Carotid bruit is not present. No tracheal deviation present. No thyroid mass and no thyromegaly present.   Cardiovascular: Normal rate, regular rhythm, S1 normal, S2 normal, normal heart sounds and intact distal pulses. PMI is not displaced. Exam reveals no gallop and no friction rub.   No murmur heard.  Pulmonary/Chest: Effort normal and breath sounds normal. No accessory muscle usage. No respiratory distress. She exhibits no mass, no tenderness and no crepitus. Right breast exhibits no inverted nipple, no mass, no nipple discharge, no skin change and no tenderness. Left breast exhibits no inverted nipple, no mass, no nipple discharge, no skin change and no tenderness. Breasts are symmetrical.   Abdominal: Soft. Normal appearance and bowel sounds are normal. She exhibits no distension, no fluid wave, no abdominal bruit and no mass. There is no hepatosplenomegaly. There is no tenderness. There is no rebound, no guarding and negative Beard's sign. No hernia. Hernia confirmed negative in the right inguinal area and confirmed negative in the left inguinal area.   Genitourinary: Rectum normal and vagina normal. There is no rash or lesion on the right labia. There is no rash or lesion on the left labia. Right adnexum displays no mass. Left adnexum displays no mass. No vaginal discharge found.   Musculoskeletal:        Cervical back: Normal.        Thoracic back: Normal.        Lumbar back: Normal.        Right upper arm: Normal.        Left upper arm: Normal.        Right forearm: Normal.        Left forearm: Normal.        Right hand: Normal.        Left hand: Normal.        Right upper leg: Normal.        Left upper leg: Normal.        Right lower leg:  Normal.        Left lower leg: Normal.        Right foot: Normal.        Left foot: Normal.   Lymphadenopathy:        Head (right side): No submental, no submandibular and no posterior auricular adenopathy present.        Head (left side): No submental, no submandibular and no posterior auricular adenopathy present.     She has no cervical adenopathy.     She has no axillary adenopathy.        Right: No inguinal and no supraclavicular adenopathy present.        Left: No inguinal and no supraclavicular adenopathy present.   Neurological: She is alert and oriented to person, place, and time. She has normal strength. No cranial nerve deficit or sensory deficit. Coordination and gait normal. GCS eye subscore is 4. GCS verbal subscore is 5. GCS motor subscore is 6.   Skin: Skin is warm, dry and intact. No rash noted. No cyanosis. Nails show no clubbing.   Psychiatric: She has a normal mood and affect. Her speech is normal. Judgment and thought content normal. Her mood appears not anxious. Her affect is not inappropriate. She is not actively hallucinating. She does not exhibit a depressed mood.           Medical Records Review:  Lab results reviewed from 4/30/2020.  CBC showed no evidence of leukocytosis or thrombocytopenia, mild stable normochromic normocytic anemia was noted and unchanged compared with a CBC from 1 year ago.  Electrolytes revealed a mild hyponatremia and hypocarbia, otherwise all electrolytes were in the range of normal.  BUN and creatinine showed no evidence of renal dysfunction.  Glucose was mildly elevated.  Liver profile showed no evidence of hepatocellular disease or biliary obstruction.  TSH and free T4 indicates she is mildly hyperthyroid.  Vitamin-D level shows evidence of mild deficiency.  Hemoglobin A1c was elevated consistent diabetes.  Lipid profile showed a mild mixed hyperlipidemia.    EKG reviewed from 3/11/2020.  Twelve lead tracing showed normal sinus rhythm with a right bundle branch  block.    Assessment:       Dysphagia, esophageal phase.  Differential diagnosis includes but is not limited to esophageal motility disorder, esophageal stricture, esophageal stenosis, esophageal neoplasm, hiatal hernia, gastroesophageal reflux disease, esophageal spasm, esophageal diverticulum, etc. Diagnostic options include but are not limited to second opinion, observation, empiric therapy, radiologic studies, manometry, endoscopy, etc.  Multiple comorbid issues ( hypertension, hyperlipidemia, diabetes, hypothyroidism ) increase the complexity of required perioperative evaluation & management, risks of complications, and likely will increase the difficulty and complexity of postoperative care, etc.  These issues must be factored in to preoperative evaluation and perioperative management.    1. Dysphagia, unspecified type          Plan:     Dysphagia, unspecified type  -     Ambulatory referral/consult to General Surgery  -     Case Request Operating Room: ESOPHAGOGASTRODUODENOSCOPY (EGD)  -     COVID-19 Routine Screening; Future; Expected date: 06/23/2020        Follow up around 7/3/2020 for routine post endoscopy visit    Counseling/Medical Decision Making:  Maliha Jaramillo was counseled on the results of the evaluation thus far and the differential diagnosis as well as the diagnostic and therapeutic options.  Risks, benefits, possible complications, details of, and indications for each option were also discussed.  Diagnoses discussed included but were not limited to: esophagitis, gastritis, ulcer disease, neoplastic disease, GERD, hiatal hernia, angiodysplasias, etc.  Options discussed included but were not limited to: radiologic studies, empiric medical management, observation, second opinion, PillCam, EGD.  Possible complications of EGD discussed included but were not limited to: bleeding, injury to internal organs, infections, endocarditis, incomplete exam, failure to diagnose a cancer or other serious  conditions, need for emergency surgery, need for additional operations or procedures.  Entire conversation held in laymans terms.  All unfamiliar terms defined.  Questions were solicited and answered.  I read the entire consent form to her verbatim.  A copy of the consent form was provided for her review outside the office / hospital prior to the procedure.  [employer] publications pertaining to endoscopy, GERD, and ulcer disease were provided.  At the conclusion of the conversation she voiced complete understanding of all that we discussed, satisfaction that all questions were fully answered, and that she felt fully informed and completely capable of making an informed decision.  She requests and desires to proceed with an EGD.

## 2020-06-04 LAB
LEFT EYE DM RETINOPATHY: NEGATIVE
RIGHT EYE DM RETINOPATHY: NEGATIVE

## 2020-06-08 ENCOUNTER — PATIENT MESSAGE (OUTPATIENT)
Dept: FAMILY MEDICINE | Facility: CLINIC | Age: 80
End: 2020-06-08

## 2020-06-08 DIAGNOSIS — E11.42 TYPE 2 DIABETES MELLITUS WITH DIABETIC POLYNEUROPATHY, WITHOUT LONG-TERM CURRENT USE OF INSULIN: Primary | ICD-10-CM

## 2020-06-23 ENCOUNTER — LAB VISIT (OUTPATIENT)
Dept: FAMILY MEDICINE | Facility: CLINIC | Age: 80
End: 2020-06-23
Payer: MEDICARE

## 2020-06-23 DIAGNOSIS — R13.10 DYSPHAGIA, UNSPECIFIED TYPE: ICD-10-CM

## 2020-06-23 PROCEDURE — U0003 INFECTIOUS AGENT DETECTION BY NUCLEIC ACID (DNA OR RNA); SEVERE ACUTE RESPIRATORY SYNDROME CORONAVIRUS 2 (SARS-COV-2) (CORONAVIRUS DISEASE [COVID-19]), AMPLIFIED PROBE TECHNIQUE, MAKING USE OF HIGH THROUGHPUT TECHNOLOGIES AS DESCRIBED BY CMS-2020-01-R: HCPCS

## 2020-06-24 ENCOUNTER — ANESTHESIA EVENT (OUTPATIENT)
Dept: SURGERY | Facility: HOSPITAL | Age: 80
End: 2020-06-24
Payer: MEDICARE

## 2020-06-24 ENCOUNTER — HOSPITAL ENCOUNTER (OUTPATIENT)
Dept: PREADMISSION TESTING | Facility: HOSPITAL | Age: 80
Discharge: HOME | End: 2020-06-24
Attending: SURGERY
Payer: MEDICARE

## 2020-06-24 PROCEDURE — 99900103 DSU ONLY-NO CHARGE-INITIAL HR (STAT)

## 2020-06-24 NOTE — H&P
[facility] - General Surgery  General Surgery  H&P  6/25/2020    Patient Name: Maliha Jaramillo  MRN: [id number]     Chief Complaint:  I am here to be scoped and have my esophagus stretched      HPI:  Ms. Jaramillo presents today to proceed with EGD and esophageal dilation.  She was initially seen in Surgery Clinic on 6/3/2020 as a consult from [facility].  She is sent in consultation for esophageal dysphagia.  She has been experiencing difficulty swallowing pills for several months.  The exact duration of symptoms cannot be determined.  No nausea.  No abdominal or chest pain.  No hematemesis.  No fever or chills.  No diarrhea or constipation.  No weight loss.  No aggravating factors.  No triggering issues.  No other associated symptoms.  No alleviating factors.      Allergies & Meds:     Allergen Reactions    Flu vaccine [id number](5 yr up) Anaphylaxis     Patient's left side of the face is severely swollen on the left side then became unresponsive.     Pneumococcal vaccine Anaphylaxis     Patient's left side of the face is severely swollen on the left side then became unresponsive. Had flu shot same day.     Ace inhibitors Other (See Comments)     cough       Current Outpatient Medications   Medication Sig Dispense Refill    ALPRAZolam (XANAX) 0.5 MG tablet Take 1 tablet by mouth twice daily as needed for anxiety 60 tablet 2    aspirin 325 MG tablet Take 325 mg by mouth once daily.      atorvastatin (LIPITOR) 40 MG tablet Take 1 tablet (40 mg total) by mouth once daily. 90 tablet 3    carvediloL (COREG) 6.25 MG tablet Take 1 tablet (6.25 mg total) by mouth 2 (two) times daily with meals. Can not afford bystolic 180 tablet 1    cholecalciferol, vitamin D3, 1,250 mcg (50,000 unit) capsule Take 1 capsule (50,000 Units total) by mouth once a week. 12 capsule 1    diltiaZEM (CARDIZEM CD) 300 MG 24 hr capsule Take 300 mg by mouth once daily.      estradiol  (ESTRACE) 0.01 % (0.1 mg/gram) vaginal cream Place 1 g vaginally twice a week. 46 g 3    glipiZIDE (GLUCOTROL) 10 MG tablet TAKE 1 TABLET BY MOUTH TWICE DAILY BEFORE MEALS 180 tablet 3    hydrALAZINE (APRESOLINE) 25 MG tablet       imipramine (TOFRANIL) 50 MG tablet TAKE 1 TABLET BY MOUTH ONCE DAILY IN THE EVENING 90 tablet 0    levothyroxine (SYNTHROID) 88 MCG tablet Take 1 tablet (88 mcg total) by mouth before breakfast. 90 tablet 0    pregabalin (LYRICA) 25 MG capsule Take 1 capsule (25 mg total) by mouth 2 (two) times daily. 60 capsule 5    valsartan (DIOVAN) 40 MG tablet Take 1 tablet (40 mg total) by mouth once daily. 90 tablet 3    alendronate (FOSAMAX) 70 MG tablet Take 1 tablet (70 mg total) by mouth every 7 days. (Patient not taking: Reported on 6/3/2020) 4 tablet 11    levoFLOXacin (LEVAQUIN) 500 MG tablet Take 1 tablet (500 mg total) by mouth once daily. (Patient not taking: Reported on 6/3/2020) 7 tablet 0    semaglutide (OZEMPIC) 1 mg/dose (2 mg/1.5 mL) PnIj Inject 0.75 mLs into the skin once a week. (Patient not taking: Reported on 6/3/2020) 6 Syringe 3         PMFSHx:  Past Medical History:   Diagnosis Date    Anxiety 1954    Arthritis 2013    Hyperlipidemia 2012    Hypothyroidism (acquired) 2004    Osteoporosis 07/2019       Past Surgical History:   Procedure Laterality Date    APPENDECTOMY  1948    BREAST BIOPSY Right 1980's    benign    HYSTERECTOMY  1978    TONSILLECTOMY  1945    TOTAL REDUCTION MAMMOPLASTY Bilateral 1987       Family History   Problem Relation Age of Onset    Cancer Sister         Adenocarcinoma    Stroke Father        Social History     Tobacco Use    Smoking status: Former Smoker    Smokeless tobacco: Never Used   Substance Use Topics    Alcohol use: Yes     Comment: Couple of drinks per month    Drug use: No       Review of Systems   Constitutional: Negative for appetite change, chills, fatigue, fever and unexpected weight change.   HENT: Negative for  congestion, dental problem, ear pain, mouth sores, postnasal drip, rhinorrhea, sore throat, tinnitus and voice change.    Eyes: Negative for photophobia, pain, discharge and visual disturbance.   Respiratory: Negative for cough, chest tightness, shortness of breath and wheezing.    Cardiovascular: Negative for chest pain, palpitations and leg swelling.   Gastrointestinal: Negative for abdominal pain, blood in stool, constipation, diarrhea, nausea and vomiting.   Endocrine: Negative for cold intolerance, heat intolerance, polydipsia, polyphagia and polyuria.   Genitourinary: Negative for difficulty urinating, dysuria, flank pain, frequency, hematuria and urgency.   Musculoskeletal: Negative for arthralgias, joint swelling and myalgias.   Skin: Negative for color change and rash.   Allergic/Immunologic: Negative for immunocompromised state.   Neurological: Negative for dizziness, tremors, seizures, syncope, speech difficulty, weakness, numbness and headaches.   Hematological: Negative for adenopathy. Does not bruise/bleed easily.   Psychiatric/Behavioral: Negative for agitation, confusion, hallucinations, self-injury and suicidal ideas. The patient is not nervous/anxious.             Physical Exam   Constitutional: She is oriented to person, place, and time. She appears well-developed and well-nourished. She is active.  Non-toxic appearance. No distress. Body mass index is 22.26 kg/m².  HENT: Head: Normocephalic and atraumatic.   Right Ear: Hearing and external ear normal. No drainage or tenderness.   Left Ear: Hearing and external ear normal. No drainage or tenderness.   Nose: Nose normal. No rhinorrhea. No epistaxis.   Mouth/Throat: Uvula is midline, oropharynx is clear and moist and mucous membranes are normal. Mucous membranes are not pale, not dry and not cyanotic. No oropharyngeal exudate.   Eyes: Pupils are equal, round, and reactive to light. Conjunctivae and lids are normal. Right eye exhibits no discharge  and no exudate. Left eye exhibits no discharge and no exudate. Right conjunctiva is not injected. Right eye exhibits no nystagmus. Left eye exhibits no nystagmus.   Neck: Trachea normal, full passive range of motion without pain and phonation normal. No JVD present. Carotid bruit is not present. No tracheal deviation present. No thyroid mass and no thyromegaly present.   Cardiovascular: Normal rate, regular rhythm, S1 normal, S2 normal, normal heart sounds and intact distal pulses. PMI is not displaced. Exam reveals no gallop and no friction rub.   No murmur heard.  Pulmonary/Chest: Effort normal and breath sounds normal. No accessory muscle usage. No respiratory distress. She exhibits no mass, no tenderness and no crepitus. Right breast exhibits no inverted nipple, no mass, no nipple discharge, no skin change and no tenderness. Left breast exhibits no inverted nipple, no mass, no nipple discharge, no skin change and no tenderness. Breasts are symmetrical.   Abdominal: Soft. Normal appearance and bowel sounds are normal. She exhibits no distension, no fluid wave, no abdominal bruit and no mass. There is no hepatosplenomegaly. There is no tenderness. There is no rebound, no guarding and negative Beard's sign. No hernia. Hernia confirmed negative in the right inguinal area and confirmed negative in the left inguinal area.   Genitourinary: Rectum normal and vagina normal. There is no rash or lesion on the right labia. There is no rash or lesion on the left labia. Right adnexum displays no mass. Left adnexum displays no mass. No vaginal discharge found.   Musculoskeletal:        Cervical back: Normal.        Thoracic back: Normal.        Lumbar back: Normal.        Right upper arm: Normal.        Left upper arm: Normal.        Right forearm: Normal.        Left forearm: Normal.        Right hand: Normal.        Left hand: Normal.        Right upper leg: Normal.        Left upper leg: Normal.        Right lower leg:  Normal.        Left lower leg: Normal.        Right foot: Normal.        Left foot: Normal.   Lymphadenopathy:        Head (right side): No submental, no submandibular and no posterior auricular adenopathy present.        Head (left side): No submental, no submandibular and no posterior auricular adenopathy present.     She has no cervical adenopathy.     She has no axillary adenopathy.        Right: No inguinal and no supraclavicular adenopathy present.        Left: No inguinal and no supraclavicular adenopathy present.   Neurological: She is alert and oriented to person, place, and time. She has normal strength. No cranial nerve deficit or sensory deficit. Coordination and gait normal. GCS eye subscore is 4. GCS verbal subscore is 5. GCS motor subscore is 6.   Skin: Skin is warm, dry and intact. No rash noted. No cyanosis. Nails show no clubbing.   Psychiatric: She has a normal mood and affect. Her speech is normal. Judgment and thought content normal. Her mood appears not anxious. Her affect is not inappropriate. She is not actively hallucinating. She does not exhibit a depressed mood.           Medical Records Review:  COVID-19 routine screening from 6/23/2020 still pending.  COVID-19 rapid screening from this morning negative.    Lab results reviewed from 4/30/2020.  CBC showed no evidence of leukocytosis or thrombocytopenia, mild stable normochromic normocytic anemia was noted and unchanged compared with a CBC from 1 year ago.  Electrolytes revealed a mild hyponatremia and hypocarbia, otherwise all electrolytes were in the range of normal.  BUN and creatinine showed no evidence of renal dysfunction.  Glucose was mildly elevated.  Liver profile showed no evidence of hepatocellular disease or biliary obstruction.  TSH and free T4 indicates she is mildly hyperthyroid.  Vitamin-D level shows evidence of mild deficiency.  Hemoglobin A1c was elevated consistent diabetes.  Lipid profile showed a mild mixed  hyperlipidemia.    EKG reviewed from 3/11/2020.  Twelve lead tracing showed normal sinus rhythm with a right bundle branch block.    Assessment:       Dysphagia, esophageal phase.  Differential diagnosis includes but is not limited to esophageal motility disorder, esophageal stricture, esophageal stenosis, esophageal neoplasm, hiatal hernia, gastroesophageal reflux disease, esophageal spasm, esophageal diverticulum, etc. Diagnostic options include but are not limited to second opinion, observation, empiric therapy, radiologic studies, manometry, endoscopy, etc.  Multiple comorbid issues ( hypertension, hyperlipidemia, diabetes, hypothyroidism ) increase the complexity of required perioperative evaluation & management, risks of complications, and likely will increase the difficulty and complexity of postoperative care, etc.  These issues must be factored in to preoperative evaluation and perioperative management.    1. Dysphagia, unspecified type          Plan:     Dysphagia, unspecified type  -     Case Request Operating Room: ESOPHAGOGASTRODUODENOSCOPY (EGD)      Follow up around 7/3/2020 for routine post endoscopy visit    Counseling/Medical Decision Making:  Maliha Jaramillo was counseled on the results of the evaluation thus far and the differential diagnosis as well as the diagnostic and therapeutic options.  Risks, benefits, possible complications, details of, and indications for each option were also discussed.  Diagnoses discussed included but were not limited to: esophagitis, gastritis, ulcer disease, neoplastic disease, GERD, hiatal hernia, angiodysplasias, etc.  Options discussed included but were not limited to: radiologic studies, empiric medical management, observation, second opinion, PillCam, EGD.  Possible complications of EGD discussed included but were not limited to: bleeding, injury to internal organs, infections, endocarditis, incomplete exam, failure to diagnose a cancer or other serious  conditions, need for emergency surgery, need for additional operations or procedures.  Entire conversation held in laymans terms.  All unfamiliar terms defined.  Questions were solicited and answered.  I read the entire consent form to her verbatim.  A copy of the consent form was provided for her review outside the office / hospital prior to the procedure.  [employer] publications pertaining to endoscopy, GERD, and ulcer disease were provided.  At the conclusion of the conversation she voiced complete understanding of all that we discussed, satisfaction that all questions were fully answered, and that she felt fully informed and completely capable of making an informed decision.  She requests and desires to proceed with an EGD.    No evident contraindication to proceeding with planned endoscopy.

## 2020-06-24 NOTE — PRE-PROCEDURE INSTRUCTIONS
0910- ARRIVED FOR PAT. ALERT AND ORIENTED. AMBULATORY WITH A WALKER.  PROCEDURE INSTRUCTIONS GIVEN TO PT AND VERBALIZED UNDERSTANDING. DR HUBER TO SPEAK TO PT. ANESTHESIA CONSENT SIGNED. INSTRUCTED PT TO TAKE BP MEDICATION MORNING OF PROCEDURE WITH A SIP OF WATER. NOTHING TO EAT OR DRINK AFTER MIDNIGHT.  0929- PAT COMPLETED. PT ASSISTED TO CAR.

## 2020-06-24 NOTE — ANESTHESIA PREPROCEDURE EVALUATION
06/24/2020  Maliha Jaramillo is a 79 y.o., female.    Anesthesia Evaluation    I have reviewed the Patient Summary Reports.    I have reviewed the Nursing Notes.    I have reviewed the Medications.     Review of Systems  Anesthesia Hx:  No problems with previous Anesthesia  Neg history of prior surgery. Denies Family Hx of Anesthesia complications.   Denies Personal Hx of Anesthesia complications.   Social:  Non-Smoker    Hematology/Oncology:  Hematology Normal   Oncology Normal     EENT/Dental:EENT/Dental Normal   Cardiovascular:   Hypertension, well controlled Dysrhythmias atrial fibrillation    Pulmonary:  Pulmonary Normal    Renal/:  Renal/ Normal     Hepatic/GI:  Hepatic/GI Normal    Musculoskeletal:  Musculoskeletal Normal    Neurological:   TIA, CVA    Endocrine:   Diabetes, well controlled, type 2 Hypothyroidism    Dermatological:  Skin Normal    Psych:   Psychiatric History anxiety          Physical Exam  General:  Well nourished    Airway/Jaw/Neck:  Airway Findings: Mouth Opening: Normal Tongue: Normal  General Airway Assessment: Adult  Mallampati: II  TM Distance: < 4 cm        Eyes/Ears/Nose:  EYES/EARS/NOSE FINDINGS: Normal   Dental:  DENTAL FINDINGS: Normal   Chest/Lungs:  Chest/Lungs Clear    Heart/Vascular:  Heart Findings: Normal Heart murmur: negative Vascular Findings: Normal    Abdomen:  Abdomen Findings: Normal    Musculoskeletal:  Musculoskeletal Findings: Normal   Skin:  Skin Findings: Normal    Mental Status:  Mental Status Findings: Normal        Anesthesia Plan  Type of Anesthesia, risks & benefits discussed:  Anesthesia Type:  general  Patient's Preference:   Intra-op Monitoring Plan: standard ASA monitors  Intra-op Monitoring Plan Comments:   Post Op Pain Control Plan:   Post Op Pain Control Plan Comments:   Induction:   IV  Beta Blocker:  Patient is not currently on a  Beta-Blocker (No further documentation required).       Informed Consent: Patient understands risks and agrees with Anesthesia plan.  Questions answered. Anesthesia consent signed with patient.  ASA Score: 3     Day of Surgery Review of History & Physical: I have interviewed and examined the patient. I have reviewed the patient's H&P dated:    H&P update referred to the provider.         Ready For Surgery From Anesthesia Perspective.

## 2020-06-25 ENCOUNTER — HOSPITAL ENCOUNTER (OUTPATIENT)
Facility: HOSPITAL | Age: 80
Discharge: HOME | End: 2020-06-25
Attending: SURGERY | Admitting: SURGERY
Payer: MEDICARE

## 2020-06-25 ENCOUNTER — ANESTHESIA (OUTPATIENT)
Dept: SURGERY | Facility: HOSPITAL | Age: 80
End: 2020-06-25
Payer: MEDICARE

## 2020-06-25 VITALS
TEMPERATURE: 98 F | SYSTOLIC BLOOD PRESSURE: 108 MMHG | OXYGEN SATURATION: 98 % | BODY MASS INDEX: 22.58 KG/M2 | HEIGHT: 60 IN | RESPIRATION RATE: 12 BRPM | DIASTOLIC BLOOD PRESSURE: 85 MMHG | WEIGHT: 115 LBS | HEART RATE: 78 BPM

## 2020-06-25 DIAGNOSIS — R13.10 DYSPHAGIA, UNSPECIFIED TYPE: Primary | ICD-10-CM

## 2020-06-25 PROBLEM — K29.50 CHRONIC GASTRITIS: Status: ACTIVE | Noted: 2020-06-25

## 2020-06-25 PROBLEM — K44.9 HIATAL HERNIA: Status: ACTIVE | Noted: 2020-06-25

## 2020-06-25 PROBLEM — K25.3 ACUTE PREPYLORIC ULCER: Status: ACTIVE | Noted: 2020-06-25

## 2020-06-25 PROBLEM — K21.00 GERD WITH ESOPHAGITIS: Status: ACTIVE | Noted: 2020-06-25

## 2020-06-25 PROBLEM — K22.2 ESOPHAGEAL STRICTURE: Status: ACTIVE | Noted: 2020-06-25

## 2020-06-25 LAB
POCT GLUCOSE: 122 MG/DL (ref 70–110)
POCT GLUCOSE: 183 MG/DL (ref 70–110)
SARS-COV-2 RDRP RESP QL NAA+PROBE: NEGATIVE
SARS-COV-2 RNA RESP QL NAA+PROBE: NOT DETECTED

## 2020-06-25 PROCEDURE — 88305 TISSUE EXAM BY PATHOLOGIST: CPT | Mod: 26,,, | Performed by: PATHOLOGY

## 2020-06-25 PROCEDURE — D9220A PRA ANESTHESIA: Mod: ANES,,, | Performed by: ANESTHESIOLOGY

## 2020-06-25 PROCEDURE — U0002 COVID-19 LAB TEST NON-CDC: HCPCS

## 2020-06-25 PROCEDURE — D9220A PRA ANESTHESIA: ICD-10-PCS | Mod: ANES,,, | Performed by: ANESTHESIOLOGY

## 2020-06-25 PROCEDURE — 43249 ESOPH EGD DILATION <30 MM: CPT | Mod: ,,, | Performed by: SURGERY

## 2020-06-25 PROCEDURE — 43249 ESOPH EGD DILATION <30 MM: CPT | Performed by: SURGERY

## 2020-06-25 PROCEDURE — 43239 EGD BIOPSY SINGLE/MULTIPLE: CPT | Performed by: SURGERY

## 2020-06-25 PROCEDURE — 43249 PR EGD, FLEX, W/BALL DILATION, < 30MM: ICD-10-PCS | Mod: ,,, | Performed by: SURGERY

## 2020-06-25 PROCEDURE — C1726 CATH, BAL DIL, NON-VASCULAR: HCPCS | Performed by: SURGERY

## 2020-06-25 PROCEDURE — 88305 TISSUE EXAM BY PATHOLOGIST: CPT | Mod: 59 | Performed by: PATHOLOGY

## 2020-06-25 PROCEDURE — 63600175 PHARM REV CODE 636 W HCPCS: Performed by: NURSE ANESTHETIST, CERTIFIED REGISTERED

## 2020-06-25 PROCEDURE — 25000003 PHARM REV CODE 250: Performed by: NURSE ANESTHETIST, CERTIFIED REGISTERED

## 2020-06-25 PROCEDURE — 43239 PR EGD, FLEX, W/BIOPSY, SGL/MULTI: ICD-10-PCS | Mod: 59,,, | Performed by: SURGERY

## 2020-06-25 PROCEDURE — 88305 TISSUE EXAM BY PATHOLOGIST: ICD-10-PCS | Mod: 26,,, | Performed by: PATHOLOGY

## 2020-06-25 PROCEDURE — 37000009 HC ANESTHESIA EA ADD 15 MINS: Performed by: SURGERY

## 2020-06-25 PROCEDURE — D9220A PRA ANESTHESIA: ICD-10-PCS | Mod: CRNA,,, | Performed by: NURSE ANESTHETIST, CERTIFIED REGISTERED

## 2020-06-25 PROCEDURE — D9220A PRA ANESTHESIA: Mod: CRNA,,, | Performed by: NURSE ANESTHETIST, CERTIFIED REGISTERED

## 2020-06-25 PROCEDURE — 27201423 OPTIME MED/SURG SUP & DEVICES STERILE SUPPLY: Performed by: SURGERY

## 2020-06-25 PROCEDURE — 43239 EGD BIOPSY SINGLE/MULTIPLE: CPT | Mod: 59,,, | Performed by: SURGERY

## 2020-06-25 PROCEDURE — 37000008 HC ANESTHESIA 1ST 15 MINUTES: Performed by: SURGERY

## 2020-06-25 RX ORDER — LIDOCAINE HYDROCHLORIDE 20 MG/ML
INJECTION, SOLUTION EPIDURAL; INFILTRATION; INTRACAUDAL; PERINEURAL
Status: DISCONTINUED | OUTPATIENT
Start: 2020-06-25 | End: 2020-06-25

## 2020-06-25 RX ORDER — LIDOCAINE HYDROCHLORIDE 10 MG/ML
1 INJECTION, SOLUTION EPIDURAL; INFILTRATION; INTRACAUDAL; PERINEURAL ONCE
Status: CANCELLED | OUTPATIENT
Start: 2020-06-25 | End: 2020-06-25

## 2020-06-25 RX ORDER — SODIUM CHLORIDE, SODIUM LACTATE, POTASSIUM CHLORIDE, CALCIUM CHLORIDE 600; 310; 30; 20 MG/100ML; MG/100ML; MG/100ML; MG/100ML
INJECTION, SOLUTION INTRAVENOUS CONTINUOUS
Status: CANCELLED | OUTPATIENT
Start: 2020-06-25

## 2020-06-25 RX ORDER — PROPOFOL 10 MG/ML
VIAL (ML) INTRAVENOUS
Status: DISCONTINUED | OUTPATIENT
Start: 2020-06-25 | End: 2020-06-25

## 2020-06-25 RX ORDER — SODIUM CHLORIDE, SODIUM LACTATE, POTASSIUM CHLORIDE, CALCIUM CHLORIDE 600; 310; 30; 20 MG/100ML; MG/100ML; MG/100ML; MG/100ML
INJECTION, SOLUTION INTRAVENOUS CONTINUOUS PRN
Status: DISCONTINUED | OUTPATIENT
Start: 2020-06-25 | End: 2020-06-25

## 2020-06-25 RX ORDER — SODIUM CHLORIDE, SODIUM LACTATE, POTASSIUM CHLORIDE, CALCIUM CHLORIDE 600; 310; 30; 20 MG/100ML; MG/100ML; MG/100ML; MG/100ML
125 INJECTION, SOLUTION INTRAVENOUS CONTINUOUS
Status: DISCONTINUED | OUTPATIENT
Start: 2020-06-25 | End: 2020-06-25 | Stop reason: HOSPADM

## 2020-06-25 RX ORDER — PANTOPRAZOLE SODIUM 40 MG/1
40 TABLET, DELAYED RELEASE ORAL DAILY
Qty: 30 TABLET | Refills: 11 | Status: SHIPPED | OUTPATIENT
Start: 2020-06-25 | End: 2021-08-25

## 2020-06-25 RX ORDER — ONDANSETRON 2 MG/ML
4 INJECTION INTRAMUSCULAR; INTRAVENOUS DAILY PRN
Status: DISCONTINUED | OUTPATIENT
Start: 2020-06-25 | End: 2020-06-25 | Stop reason: HOSPADM

## 2020-06-25 RX ADMIN — PROPOFOL 20 MG: 10 INJECTION, EMULSION INTRAVENOUS at 11:06

## 2020-06-25 RX ADMIN — LIDOCAINE HYDROCHLORIDE 40 MG: 20 INJECTION, SOLUTION EPIDURAL; INFILTRATION; INTRACAUDAL; PERINEURAL at 11:06

## 2020-06-25 RX ADMIN — SODIUM CHLORIDE, POTASSIUM CHLORIDE, SODIUM LACTATE AND CALCIUM CHLORIDE: 600; 310; 30; 20 INJECTION, SOLUTION INTRAVENOUS at 11:06

## 2020-06-25 NOTE — TRANSFER OF CARE
Anesthesia Transfer of Care Note    Patient: Maliha Jaramillo    Procedure(s) Performed: Procedure(s) (LRB):  ESOPHAGOGASTRODUODENOSCOPY (EGD) (N/A)    Patient location: PACU    Anesthesia Type: general    Transport from OR: Transported from OR on room air with adequate spontaneous ventilation    Post pain: adequate analgesia    Post assessment: no apparent anesthetic complications and tolerated procedure well    Post vital signs: stable    Level of consciousness: awake, alert and oriented    Nausea/Vomiting: no nausea/vomiting    Complications: none    Transfer of care protocol was followed      Last vitals:   Visit Vitals  /64 (BP Location: Right arm, Patient Position: Lying)   Pulse 83   Resp 16   Ht 5' (1.524 m)   Wt 52.2 kg (115 lb)   SpO2 100%   Breastfeeding No   BMI 22.46 kg/m²

## 2020-06-25 NOTE — DISCHARGE INSTRUCTIONS
[facility]   [phone]  [facility]      [phone]    Managing nausea    Some people have an upset stomach after surgery. This is often because of anesthesia, pain, or pain medicine, or the stress of surgery. These tips will help you handle nausea and eat healthy foods as you get better. If you were on a special food plan before surgery, ask your healthcare provider if you should follow it while you get better. These tips may help:  · Do not push yourself to eat. Your body will tell you when to eat and how much.  · Start off with clear liquids and soup. They are easier to digest.  · Next try semi-solid foods, such as mashed potatoes, applesauce, and gelatin, as you feel ready.  · Slowly move to solid foods. Dont eat fatty, rich, or spicy foods at first.  · Do not force yourself to have 3 large meals a day. Instead eat smaller amounts more often.  · Take pain medicines with a small amount of solid food, such as crackers or toast, to avoid nausea.

## 2020-06-25 NOTE — ANESTHESIA POSTPROCEDURE EVALUATION
Anesthesia Post Evaluation    Patient: Maliha Jaramillo    Procedure(s) Performed: Procedure(s) (LRB):  ESOPHAGOGASTRODUODENOSCOPY (EGD) (N/A)    Final Anesthesia Type: general    Patient location during evaluation: PACU  Patient participation: Yes- Able to Participate  Level of consciousness: awake and awake and alert  Post-procedure vital signs: reviewed and stable  Pain management: adequate  Airway patency: patent    PONV status at discharge: No PONV  Anesthetic complications: no      Cardiovascular status: blood pressure returned to baseline  Respiratory status: unassisted and spontaneous ventilation  Hydration status: euvolemic  Follow-up not needed.          Vitals Value Taken Time   /85 06/25/20 1231   Temp 36.6 °C (97.8 °F) 06/25/20 1237   Pulse 78 06/25/20 1237   Resp 12 06/25/20 1237   SpO2 98 % 06/25/20 1237         Event Time   Out of Recovery 12:30:00         Pain/Jason Score: Jason Score: 10 (6/25/2020 12:20 PM)  Modified Jason Score: 19 (6/25/2020 12:50 PM)

## 2020-06-25 NOTE — DISCHARGE SUMMARY
[facility]  Discharge Note  Short Stay    Procedure(s) (LRB):  ESOPHAGOGASTRODUODENOSCOPY (EGD) (N/A)    OUTCOME: Patient tolerated treatment/procedure well without complication and is now ready for discharge.    DISPOSITION: Home or Self Care    FINAL DIAGNOSIS:  Dysphagia.  Acute pre pyloric ulcer.  Chronic gastritis.  GERD with esophagitis.  Hiatal hernia.  Distal esophageal stricture.    FOLLOWUP: In clinic    DISCHARGE INSTRUCTIONS:    Discharge Procedure Orders   Diet diabetic     No driving until:     Order Specific Question Answer Comments   Date: 6/26/2020

## 2020-06-25 NOTE — PLAN OF CARE
Patient brought to pre op by wheelchair, CBG arm band not scaning had to manual put in. Family in waiting area has no cell phone..

## 2020-06-25 NOTE — [employer] PATIENT INSTRUCTIONS
Discharge Summary/Instructions after an Endoscopic Procedure  Patient Name: Maliha Jaramillo  Patient MRN: [id number]  Patient YOB: 1940 Thursday, June 25, 2020  Ruben Adame MD  RESTRICTIONS:  During your procedure today, you received medications for sedation.  These   medications may affect your judgment, balance and coordination.  Therefore,   for 24 hours, you have the following restrictions:   - DO NOT drive a car, operate machinery, make legal/financial decisions,   sign important papers or drink alcohol.    ACTIVITY:  Today: no heavy lifting, straining or running due to procedural   sedation/anesthesia.  The following day: return to full activity including work.  DIET:  Eat and drink normally unless instructed otherwise.     TREATMENT FOR COMMON SIDE EFFECTS:  - Mild abdominal pain, nausea, belching, bloating or excessive gas:  rest,   eat lightly and use a heating pad.  - Sore Throat: treat with throat lozenges and/or gargle with warm salt   water.  - Because air was used during the procedure, expelling large amounts of air   from your rectum or belching is normal.  - If a bowel prep was taken, you may not have a bowel movement for 1-3 days.    This is normal.  SYMPTOMS TO WATCH FOR AND REPORT TO YOUR PHYSICIAN:  1. Abdominal pain or bloating, other than gas cramps.  2. Chest pain.  3. Back pain.  4. Signs of infection such as: chills or fever occurring within 24 hours   after the procedure.  5. Rectal bleeding, which would show as bright red, maroon, or black stools.   (A tablespoon of blood from the rectum is not serious, especially if   hemorrhoids are present.)  6. Vomiting.  7. Weakness or dizziness.  GO DIRECTLY TO THE NEAREST EMERGENCY ROOM IF YOU HAVE ANY OF THE FOLLOWING:      Difficulty breathing              Chills and/or fever over 101 F   Persistent vomiting and/or vomiting blood   Severe abdominal pain   Severe chest pain   Black, tarry stools   Bleeding- more than one  tablespoon   Any other symptom or condition that you feel may need urgent attention  Your doctor recommends these additional instructions:  If any biopsies were taken, your doctors clinic will contact you in 1 to 2   weeks with any results.  - Discharge patient to home.   - Resume previous diet.   - Continue present medications.   - Begin Protonix 40 mg daily  - Await pathology results.   - Treat Helicobacter if present  - Further recommendations will depend on clinical course.  Persistent   symptoms will necessitate esophageal manometry to evaluate for achalasia.  For questions, problems or results please call your physician - Ruben Adame MD at Work:  [phone].  [facility] EMERGENCY ROOM PHONE NUMBER: [phone]  IF A COMPLICATION OR EMERGENCY SITUATION ARISES AND YOU ARE UNABLE TO REACH   YOUR PHYSICIAN - GO DIRECTLY TO THE EMERGENCY ROOM.  MD Ruben Bowie MD  6/25/2020 4:30:49 PM  This report has been verified and signed electronically.  [employer]

## 2020-06-25 NOTE — PLAN OF CARE
Has met unit/department guidelines for discharge from each phase of the post procedure continuum. Leaving floor per w/c. Awake and alert. Resp even and unlabored room air. No distress noted. All personal belongings returned to pt.

## 2020-06-30 LAB
FINAL PATHOLOGIC DIAGNOSIS: NORMAL
GROSS: NORMAL

## 2020-07-09 ENCOUNTER — PATIENT OUTREACH (OUTPATIENT)
Dept: ADMINISTRATIVE | Facility: OTHER | Age: 80
End: 2020-07-09

## 2020-07-09 ENCOUNTER — TELEPHONE (OUTPATIENT)
Dept: FAMILY MEDICINE | Facility: CLINIC | Age: 80
End: 2020-07-09

## 2020-07-09 DIAGNOSIS — R39.9 UTI SYMPTOMS: Primary | ICD-10-CM

## 2020-07-09 RX ORDER — CIPROFLOXACIN 500 MG/1
500 TABLET ORAL EVERY 12 HOURS
Qty: 14 TABLET | Refills: 0 | Status: SHIPPED | OUTPATIENT
Start: 2020-07-09 | End: 2020-07-16

## 2020-07-09 NOTE — PROGRESS NOTES
Requested updates within Care Everywhere.  Patient's chart was reviewed for overdue MARIA T topics.  Immunizations reconciled.

## 2020-07-09 NOTE — TELEPHONE ENCOUNTER
----- Message from Jillian Trammell LPN sent at 7/9/2020  2:34 PM CDT -----  Regarding: FW: Patient Advice  Contact: patient    ----- Message -----  From: Bridget Beckford  Sent: 7/9/2020   1:17 PM CDT  To: Hermes Griffith  Subject: Patient Advice                                   Type: Needs Medical Advice  Who Called:  Patient  Symptoms (please be specific):  bladder infection.  Feeling of having to urinate frequently & when she goes she states it is terrible.  Put depends on b/c she has already gone through a pair of pants  How long has patient had these symptoms:  started this am  Pharmacy name and phone #:  [address] [phone] (Phone)   Best Call Back Number: [phone] requesting a call back  Additional Information:   Patient requesting something be called in for her.

## 2020-07-10 ENCOUNTER — OFFICE VISIT (OUTPATIENT)
Dept: SURGERY | Facility: CLINIC | Age: 80
End: 2020-07-10
Payer: MEDICARE

## 2020-07-10 VITALS
TEMPERATURE: 99 F | HEART RATE: 83 BPM | OXYGEN SATURATION: 97 % | BODY MASS INDEX: 22.38 KG/M2 | DIASTOLIC BLOOD PRESSURE: 76 MMHG | SYSTOLIC BLOOD PRESSURE: 122 MMHG | WEIGHT: 114 LBS | HEIGHT: 60 IN | RESPIRATION RATE: 13 BRPM

## 2020-07-10 DIAGNOSIS — K25.3 ACUTE PREPYLORIC ULCER: Primary | ICD-10-CM

## 2020-07-10 DIAGNOSIS — K29.30 CHRONIC SUPERFICIAL GASTRITIS WITHOUT BLEEDING: ICD-10-CM

## 2020-07-10 PROBLEM — K22.2 ESOPHAGEAL STRICTURE: Status: RESOLVED | Noted: 2020-06-25 | Resolved: 2020-07-10

## 2020-07-10 PROBLEM — R13.10 DYSPHAGIA: Status: RESOLVED | Noted: 2020-06-25 | Resolved: 2020-07-10

## 2020-07-10 PROCEDURE — 99213 OFFICE O/P EST LOW 20 MIN: CPT | Mod: S$GLB,,, | Performed by: SURGERY

## 2020-07-10 PROCEDURE — 99213 PR OFFICE/OUTPT VISIT, EST, LEVL III, 20-29 MIN: ICD-10-PCS | Mod: S$GLB,,, | Performed by: SURGERY

## 2020-07-10 NOTE — PROGRESS NOTES
"Subjective:       Patient ID: Maliha Jaramillo is a 79 y.o. female.    Chief Complaint: Follow-up (EGD 6/25/20)      HPI:  Ms. Jaramillo presents today following a recent outpatient EGD.  She presents today with no complaints.  No nausea, vomiting, fevers, chills, abdominal pain, diarrhea, constipation, melena, hematochezia, hematemesis, etc.  Appetite is excellent.  Weight is stable.  Activity tolerance is normal.  Overall she feels "great".  EGD revealed evidence of possible achalasia, gastritis, and a gastric ulcer.  Her distal esophagus was dilated with a balloon.  Symptoms of dysphagia she was experiencing prior to the endoscopy have resolved.  She is taking the Protonix that was prescribed following the EGD.      Allergies & Meds:  Review of patient's allergies indicates:   Allergen Reactions    Flu vaccine [id number](5 yr up) Anaphylaxis     Patient's left side of the face is severely swollen on the left side then became unresponsive.     Pneumococcal vaccine Anaphylaxis     Patient's left side of the face is severely swollen on the left side then became unresponsive. Had flu shot same day.     Ace inhibitors Other (See Comments)     cough       Current Outpatient Medications   Medication Sig Dispense Refill    ALPRAZolam (XANAX) 0.5 MG tablet Take 1 tablet by mouth twice daily as needed for anxiety 60 tablet 2    aspirin 325 MG tablet Take 325 mg by mouth once daily.      atorvastatin (LIPITOR) 40 MG tablet Take 1 tablet (40 mg total) by mouth once daily. 90 tablet 3    carvediloL (COREG) 6.25 MG tablet Take 1 tablet (6.25 mg total) by mouth 2 (two) times daily with meals. Can not afford bystolic 180 tablet 1    cholecalciferol, vitamin D3, 1,250 mcg (50,000 unit) capsule Take 1 capsule (50,000 Units total) by mouth once a week. 12 capsule 1    ciprofloxacin HCl (CIPRO) 500 MG tablet Take 1 tablet (500 mg total) by mouth every 12 (twelve) hours. for 7 days 14 tablet 0    diltiaZEM (CARDIZEM CD) " 300 MG 24 hr capsule Take 300 mg by mouth once daily.      dulaglutide (TRULICITY) 1.5 mg/0.5 mL PnIj Inject 1.5 mg into the skin every 7 days. 4 Syringe 5    estradiol (ESTRACE) 0.01 % (0.1 mg/gram) vaginal cream Place 1 g vaginally twice a week. 46 g 3    glipiZIDE (GLUCOTROL) 10 MG tablet TAKE 1 TABLET BY MOUTH TWICE DAILY BEFORE MEALS 180 tablet 3    hydrALAZINE (APRESOLINE) 25 MG tablet       imipramine (TOFRANIL) 50 MG tablet TAKE 1 TABLET BY MOUTH ONCE DAILY IN THE EVENING 90 tablet 0    levothyroxine (SYNTHROID) 88 MCG tablet Take 1 tablet (88 mcg total) by mouth before breakfast. 90 tablet 0    pantoprazole (PROTONIX) 40 MG tablet Take 1 tablet (40 mg total) by mouth once daily. Take in the morning before breakfast.  Wait 30 minutes before eating or drinking anything 30 tablet 11    pregabalin (LYRICA) 25 MG capsule Take 1 capsule by mouth twice daily 60 capsule 5    valsartan (DIOVAN) 40 MG tablet Take 1 tablet (40 mg total) by mouth once daily. 90 tablet 3    alendronate (FOSAMAX) 70 MG tablet Take 1 tablet (70 mg total) by mouth every 7 days. 4 tablet 11     No current facility-administered medications for this visit.        PMFSHx:    Past Medical History:   Diagnosis Date    Anxiety 1954    Arthritis 2013    Atrial fibrillation     Diabetes mellitus     Hyperlipidemia 2012    Hypertension     Hypothyroidism (acquired) 2004    Osteoporosis 07/2019    Tachycardia        Past Surgical History:   Procedure Laterality Date    APPENDECTOMY  1948    BREAST BIOPSY Right 1980's    benign    ESOPHAGOGASTRODUODENOSCOPY N/A 6/25/2020    Procedure: ESOPHAGOGASTRODUODENOSCOPY (EGD);  Surgeon: Ruben Adame MD;  Location: [facility];  Service: General;  Laterality: N/A;    HYSTERECTOMY  1978    TONSILLECTOMY  1945    TOTAL REDUCTION MAMMOPLASTY Bilateral 1987       Family History   Problem Relation Age of Onset    Cancer Sister         Adenocarcinoma    Stroke Father        Social History      Tobacco Use    Smoking status: Former Smoker    Smokeless tobacco: Never Used   Substance Use Topics    Alcohol use: Yes     Comment: Couple of drinks per month    Drug use: No         Review of Systems   Constitutional: Negative for appetite change, chills, fatigue, fever and unexpected weight change.   HENT: Negative for congestion, dental problem, ear pain, mouth sores, postnasal drip, rhinorrhea, sore throat, tinnitus, trouble swallowing and voice change.    Eyes: Negative for photophobia, pain, discharge and visual disturbance.   Respiratory: Negative for cough, chest tightness, shortness of breath and wheezing.    Cardiovascular: Negative for chest pain, palpitations and leg swelling.   Gastrointestinal: Negative for abdominal pain, blood in stool, constipation, diarrhea, nausea and vomiting.   Endocrine: Negative for cold intolerance, heat intolerance, polydipsia, polyphagia and polyuria.   Genitourinary: Negative for difficulty urinating, dysuria, flank pain, frequency, hematuria and urgency.   Musculoskeletal: Negative for arthralgias, joint swelling and myalgias.   Skin: Negative for color change and rash.   Allergic/Immunologic: Negative for immunocompromised state.   Neurological: Negative for dizziness, tremors, seizures, syncope, speech difficulty, weakness, numbness and headaches.   Hematological: Negative for adenopathy. Does not bruise/bleed easily.   Psychiatric/Behavioral: Negative for agitation, confusion, hallucinations, self-injury and suicidal ideas. The patient is not nervous/anxious.        Objective:      Physical Exam  Constitutional:       General: She is not in acute distress.     Appearance: Normal appearance. She is well-developed. She is not ill-appearing or toxic-appearing.      Comments: Body mass index is 22.26 kg/m².   HENT:      Head: Normocephalic and atraumatic.      Right Ear: Hearing and external ear normal.      Left Ear: Hearing and external ear normal.      Nose:  Nose normal.   Eyes:      General: Lids are normal. No scleral icterus.     Conjunctiva/sclera: Conjunctivae normal.   Neck:      Musculoskeletal: Normal range of motion and neck supple.      Thyroid: No thyroid mass or thyromegaly.      Vascular: No carotid bruit or JVD.      Trachea: Trachea and phonation normal.   Cardiovascular:      Rate and Rhythm: Normal rate and regular rhythm.      Chest Wall: PMI is not displaced.      Heart sounds: Normal heart sounds. No murmur. No gallop.    Pulmonary:      Effort: Pulmonary effort is normal. No tachypnea, accessory muscle usage or respiratory distress.      Breath sounds: Normal breath sounds.   Abdominal:      General: Bowel sounds are normal.      Palpations: Abdomen is soft.      Tenderness: There is no abdominal tenderness.      Hernia: No hernia is present.   Lymphadenopathy:      Cervical: No cervical adenopathy.      Upper Body:      Right upper body: No supraclavicular adenopathy.      Left upper body: No supraclavicular adenopathy.   Skin:     General: Skin is warm and dry.      Findings: No rash.      Nails: There is no clubbing.     Neurological:      Mental Status: She is alert and oriented to person, place, and time. She is not disoriented.      GCS: GCS eye subscore is 4. GCS verbal subscore is 5. GCS motor subscore is 6.   Psychiatric:         Attention and Perception: She is attentive.         Speech: Speech normal.         Behavior: Behavior normal.         Thought Content: Thought content normal.         Judgment: Judgment normal.           Medical Records Review:  EGD and pathology reports reviewed from 6/25/2020.  EGD report confirmed above summarized findings.  Pathology report reviewed from 6/25/2020.  Esophageal biopsies were unremarkable.  Gastric biopsy showed evidence of gastritis, Helicobacter negative.  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  Possible  achalasia.  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  Symptoms much improved.  Pre-pyloric ulcer.    1. Acute prepyloric ulcer    2. Chronic superficial gastritis without bleeding          Plan:     Acute prepyloric ulcer    Chronic superficial gastritis without bleeding     Continue Protonix.  RTC for any recurrent symptoms of dysphagia.  RTC 1 year to schedule surveillance EGD.      Follow up in about 1 year (around 7/10/2021).    Counseling/Medical Decision Making:

## 2020-08-11 ENCOUNTER — PATIENT MESSAGE (OUTPATIENT)
Dept: FAMILY MEDICINE | Facility: CLINIC | Age: 80
End: 2020-08-11

## 2020-08-17 ENCOUNTER — OFFICE VISIT (OUTPATIENT)
Dept: FAMILY MEDICINE | Facility: CLINIC | Age: 80
End: 2020-08-17
Payer: MEDICARE

## 2020-08-17 ENCOUNTER — TELEPHONE (OUTPATIENT)
Dept: FAMILY MEDICINE | Facility: CLINIC | Age: 80
End: 2020-08-17

## 2020-08-17 VITALS
SYSTOLIC BLOOD PRESSURE: 123 MMHG | RESPIRATION RATE: 17 BRPM | OXYGEN SATURATION: 96 % | HEIGHT: 60 IN | BODY MASS INDEX: 22.81 KG/M2 | WEIGHT: 116.19 LBS | DIASTOLIC BLOOD PRESSURE: 74 MMHG | TEMPERATURE: 99 F | HEART RATE: 98 BPM

## 2020-08-17 DIAGNOSIS — E11.42 TYPE 2 DIABETES MELLITUS WITH DIABETIC POLYNEUROPATHY, WITHOUT LONG-TERM CURRENT USE OF INSULIN: Primary | ICD-10-CM

## 2020-08-17 DIAGNOSIS — E11.9 ENCOUNTER FOR DIABETIC FOOT EXAM: ICD-10-CM

## 2020-08-17 DIAGNOSIS — M54.9 DORSALGIA: ICD-10-CM

## 2020-08-17 DIAGNOSIS — M54.50 LUMBAR PAIN: Primary | ICD-10-CM

## 2020-08-17 DIAGNOSIS — E11.42 TYPE 2 DIABETES MELLITUS WITH DIABETIC POLYNEUROPATHY, WITHOUT LONG-TERM CURRENT USE OF INSULIN: ICD-10-CM

## 2020-08-17 DIAGNOSIS — F41.9 ANXIETY: ICD-10-CM

## 2020-08-17 PROCEDURE — 99214 PR OFFICE/OUTPT VISIT, EST, LEVL IV, 30-39 MIN: ICD-10-PCS | Mod: S$GLB,,, | Performed by: NURSE PRACTITIONER

## 2020-08-17 PROCEDURE — 99214 OFFICE O/P EST MOD 30 MIN: CPT | Mod: S$GLB,,, | Performed by: NURSE PRACTITIONER

## 2020-08-17 RX ORDER — CYCLOBENZAPRINE HCL 10 MG
10 TABLET ORAL 3 TIMES DAILY PRN
Qty: 30 TABLET | Refills: 1 | Status: SHIPPED | OUTPATIENT
Start: 2020-08-17 | End: 2020-08-27

## 2020-08-17 RX ORDER — ALPRAZOLAM 0.5 MG/1
0.5 TABLET ORAL 2 TIMES DAILY
Qty: 60 TABLET | Refills: 2 | Status: SHIPPED | OUTPATIENT
Start: 2020-08-17 | End: 2020-11-13 | Stop reason: SDUPTHER

## 2020-08-17 NOTE — TELEPHONE ENCOUNTER
Please fax today's note and all of 4/30/20 labs to Dr. Valdivia for her upcoming appt.      Also I ordered an A1c and BMP after pt left. Please notify her and schedule her to have these drawn prior to her follow up with me. She can actually do it on the same day as her appointment to save her a trip from [place].

## 2020-08-17 NOTE — PROGRESS NOTES
Subjective:       Patient ID: Maliha Jaramillo is a 79 y.o. female.    Chief Complaint: Follow-up (3 month f/u)  -  80 y/o female presents for xanax q3 month refill. She reports taking xanax twice daily on most days and reports dose is effective. She also presents with c/o lower back pain x 2-3 weeks. She generally gets massages but since Covid they have not been open. She has been taking Brown pills OTC, with some relief.   AM glucose 120-148, last A1c 8.7%.  She started on Trulicity 2 months ago.  She was referred to [facility] for evaluation of esophageal dilation thus dilated with all symptoms of food getting stuck has resoled. She is no longer throwing up since dilation.  She reports elevated HR of 160's at home last week, called the cardiologist with instructions to take the additional Cardizem dose in addition to her routine dose.  Her son is present. We discussed PT/OT a lot as son feels it is beneficial and pt does not. They report she does yard work and can get onto the ground but can not get up.   -   Past Medical History:   Diagnosis Date    Anxiety 1954    Arthritis 2013    Atrial fibrillation     Diabetes mellitus     Hyperlipidemia 2012    Hypertension     Hypothyroidism (acquired) 2004    Osteoporosis 07/2019    Tachycardia        Past Surgical History:   Procedure Laterality Date    APPENDECTOMY  1948    BREAST BIOPSY Right 1980's    benign    ESOPHAGOGASTRODUODENOSCOPY N/A 6/25/2020    Procedure: ESOPHAGOGASTRODUODENOSCOPY (EGD);  Surgeon: Ruben Adame MD;  Location: [facility];  Service: General;  Laterality: N/A;    HYSTERECTOMY  1978    TONSILLECTOMY  1945    TOTAL REDUCTION MAMMOPLASTY Bilateral 1987        Social History     Socioeconomic History    Marital status:      Spouse name: Not on file    Number of children: Not on file    Years of education: Not on file    Highest education level: Not on file   Occupational History    Not on file   Social Needs     Financial resource strain: Somewhat hard    Food insecurity     Worry: Often true     Inability: Often true    Transportation needs     Medical: No     Non-medical: No   Tobacco Use    Smoking status: Former Smoker    Smokeless tobacco: Never Used   Substance and Sexual Activity    Alcohol use: Yes     Frequency: 2-4 times a month     Drinks per session: 1 or 2     Binge frequency: Never     Comment: Couple of drinks per month    Drug use: No    Sexual activity: Not Currently   Lifestyle    Physical activity     Days per week: 0 days     Minutes per session: 10 min    Stress: To some extent   Relationships    Social connections     Talks on phone: More than three times a week     Gets together: More than three times a week     Attends Judaism service: Not on file     Active member of club or organization: No     Attends meetings of clubs or organizations: Never     Relationship status:    Other Topics Concern    Not on file   Social History Narrative    Not on file       Family History   Problem Relation Age of Onset    Cancer Sister         Adenocarcinoma    Stroke Father        Review of patient's allergies indicates:   Allergen Reactions    Flu vaccine [id number](5 yr up) Anaphylaxis     Patient's left side of the face is severely swollen on the left side then became unresponsive.     Pneumococcal vaccine Anaphylaxis     Patient's left side of the face is severely swollen on the left side then became unresponsive. Had flu shot same day.     Ace inhibitors Other (See Comments)     cough          Current Outpatient Medications:     ALPRAZolam (XANAX) 0.5 MG tablet, Take 1 tablet (0.5 mg total) by mouth 2 (two) times daily. as needed for anxiety., Disp: 60 tablet, Rfl: 2    aspirin 325 MG tablet, Take 325 mg by mouth once daily., Disp: , Rfl:     atorvastatin (LIPITOR) 40 MG tablet, Take 1 tablet (40 mg total) by mouth once daily., Disp: 90 tablet, Rfl: 3    carvediloL (COREG) 6.25 MG  tablet, Take 1 tablet (6.25 mg total) by mouth 2 (two) times daily with meals. Can not afford bystolic, Disp: 180 tablet, Rfl: 1    cholecalciferol, vitamin D3, 1,250 mcg (50,000 unit) capsule, Take 1 capsule (50,000 Units total) by mouth once a week., Disp: 12 capsule, Rfl: 1    diltiaZEM (CARDIZEM CD) 300 MG 24 hr capsule, Take 300 mg by mouth once daily., Disp: , Rfl:     dulaglutide (TRULICITY) 1.5 mg/0.5 mL PnIj, Inject 1.5 mg into the skin every 7 days., Disp: 4 Syringe, Rfl: 5    estradioL (ESTRACE) 0.01 % (0.1 mg/gram) vaginal cream, INSERT 1 GRAM OF CREAM VAGINALLY ONCE DAILY, Disp: 43 g, Rfl: 1    glipiZIDE (GLUCOTROL) 10 MG tablet, TAKE 1 TABLET BY MOUTH TWICE DAILY BEFORE MEALS, Disp: 180 tablet, Rfl: 3    hydrALAZINE (APRESOLINE) 25 MG tablet, , Disp: , Rfl:     imipramine (TOFRANIL) 50 MG tablet, TAKE 1 TABLET BY MOUTH ONCE DAILY IN THE EVENING, Disp: 90 tablet, Rfl: 0    levothyroxine (SYNTHROID) 88 MCG tablet, Take 1 tablet (88 mcg total) by mouth before breakfast., Disp: 90 tablet, Rfl: 0    pantoprazole (PROTONIX) 40 MG tablet, Take 1 tablet (40 mg total) by mouth once daily. Take in the morning before breakfast.  Wait 30 minutes before eating or drinking anything, Disp: 30 tablet, Rfl: 11    pregabalin (LYRICA) 25 MG capsule, Take 1 capsule by mouth twice daily, Disp: 60 capsule, Rfl: 5    valsartan (DIOVAN) 40 MG tablet, Take 1 tablet (40 mg total) by mouth once daily., Disp: 90 tablet, Rfl: 3    alendronate (FOSAMAX) 70 MG tablet, Take 1 tablet (70 mg total) by mouth every 7 days., Disp: 4 tablet, Rfl: 11    cyclobenzaprine (FLEXERIL) 10 MG tablet, Take 1 tablet (10 mg total) by mouth 3 (three) times daily as needed for Muscle spasms., Disp: 30 tablet, Rfl: 1    HPI  Review of Systems   Constitutional: Negative.    HENT: Negative.    Eyes: Negative.    Respiratory: Negative.    Cardiovascular: Negative.    Gastrointestinal: Negative.    Endocrine: Negative.    Genitourinary:  Negative.    Musculoskeletal: Positive for back pain.   Skin: Negative.    Allergic/Immunologic: Negative.    Neurological: Negative.    Hematological: Negative.    Psychiatric/Behavioral: The patient is nervous/anxious.        Objective:      Physical Exam  Vitals signs reviewed.   Constitutional:       Appearance: She is well-developed.   HENT:      Head: Normocephalic.   Eyes:      Conjunctiva/sclera: Conjunctivae normal.      Pupils: Pupils are equal, round, and reactive to light.   Neck:      Musculoskeletal: Normal range of motion and neck supple.      Thyroid: No thyromegaly.   Cardiovascular:      Rate and Rhythm: Normal rate and regular rhythm.      Pulses:           Dorsalis pedis pulses are 2+ on the right side and 2+ on the left side.        Posterior tibial pulses are 2+ on the right side and 2+ on the left side.      Heart sounds: Normal heart sounds. No murmur.   Pulmonary:      Effort: Pulmonary effort is normal.      Breath sounds: Normal breath sounds. No wheezing or rales.   Musculoskeletal: Normal range of motion.      Right foot: Normal range of motion. No deformity, bunion, Charcot foot, foot drop or prominent metatarsal heads.      Left foot: Normal range of motion. No deformity, bunion, Charcot foot, foot drop or prominent metatarsal heads.   Feet:      Right foot:      Protective Sensation: 4 sites tested. 4 sites sensed.      Skin integrity: Skin integrity normal.      Toenail Condition: Right toenails are normal.      Left foot:      Protective Sensation: 4 sites tested. 4 sites sensed.      Skin integrity: Skin integrity normal.      Toenail Condition: Left toenails are normal.   Skin:     General: Skin is warm and dry.      Capillary Refill: Capillary refill takes less than 2 seconds.   Neurological:      Mental Status: She is alert and oriented to person, place, and time.   Psychiatric:         Behavior: Behavior normal.         Thought Content: Thought content normal.         Judgment:  Judgment normal.         Assessment:       1. Lumbar pain    2. Anxiety    3. Encounter for diabetic foot exam    4. Dorsalgia    5. Type 2 diabetes mellitus with diabetic polyneuropathy, without long-term current use of insulin        Plan:     1- xanax bid prn with 2 refills sent to pharmacy  2- flexeril prn for back pain. If it makes you to sleepy than take hs.   3- This note to be faxed to Dr. Valdivia and most recent labs for her upcoming appt.  4- RTC 3 mo  -    Lumbar pain  -     cyclobenzaprine (FLEXERIL) 10 MG tablet; Take 1 tablet (10 mg total) by mouth 3 (three) times daily as needed for Muscle spasms.  Dispense: 30 tablet; Refill: 1    Anxiety  -     ALPRAZolam (XANAX) 0.5 MG tablet; Take 1 tablet (0.5 mg total) by mouth 2 (two) times daily. as needed for anxiety.  Dispense: 60 tablet; Refill: 2    Encounter for diabetic foot exam    Dorsalgia  -     cyclobenzaprine (FLEXERIL) 10 MG tablet; Take 1 tablet (10 mg total) by mouth 3 (three) times daily as needed for Muscle spasms.  Dispense: 30 tablet; Refill: 1    Type 2 diabetes mellitus with diabetic polyneuropathy, without long-term current use of insulin  -     Basic metabolic panel; Future; Expected date: 08/17/2020        Risks, benefits, and side effects were discussed with the patient. All questions were answered to the fullest satisfaction of the patient, and pt verbalized understanding and agreement to treatment plan. Pt was to call with any new or worsening symptoms, or present to the ER.

## 2020-08-18 NOTE — TELEPHONE ENCOUNTER
Faxed appt notes and labs to Dr Valdivia's office with confirmation.  Scheduled 3 month f/u with provider 11/13/20 and labs 11/9/20.

## 2020-10-05 ENCOUNTER — PATIENT MESSAGE (OUTPATIENT)
Dept: FAMILY MEDICINE | Facility: CLINIC | Age: 80
End: 2020-10-05

## 2020-10-05 DIAGNOSIS — R29.6 FALLING EPISODES: Primary | ICD-10-CM

## 2020-10-05 DIAGNOSIS — R26.81 UNSTEADY GAIT WHEN WALKING: ICD-10-CM

## 2020-10-05 DIAGNOSIS — R53.1 WEAKNESS: ICD-10-CM

## 2020-10-06 NOTE — TELEPHONE ENCOUNTER
Please fax order for wheel chair to [facility]. Please attach this note and message from pt about the wheel chair and under media a copy of the MRI. ty

## 2020-10-07 ENCOUNTER — TELEPHONE (OUTPATIENT)
Dept: FAMILY MEDICINE | Facility: CLINIC | Age: 80
End: 2020-10-07

## 2020-10-07 ENCOUNTER — PATIENT MESSAGE (OUTPATIENT)
Dept: FAMILY MEDICINE | Facility: CLINIC | Age: 80
End: 2020-10-07

## 2020-10-07 DIAGNOSIS — E11.42 TYPE 2 DIABETES MELLITUS WITH DIABETIC POLYNEUROPATHY, WITHOUT LONG-TERM CURRENT USE OF INSULIN: ICD-10-CM

## 2020-10-07 DIAGNOSIS — M54.9 DORSALGIA: ICD-10-CM

## 2020-10-07 DIAGNOSIS — R29.6 FALLING EPISODES: Primary | ICD-10-CM

## 2020-10-07 DIAGNOSIS — R53.1 GENERALIZED WEAKNESS: ICD-10-CM

## 2020-10-07 DIAGNOSIS — M54.50 LUMBAR PAIN: ICD-10-CM

## 2020-10-20 ENCOUNTER — PATIENT MESSAGE (OUTPATIENT)
Dept: FAMILY MEDICINE | Facility: CLINIC | Age: 80
End: 2020-10-20

## 2020-10-23 ENCOUNTER — TELEPHONE (OUTPATIENT)
Dept: FAMILY MEDICINE | Facility: CLINIC | Age: 80
End: 2020-10-23

## 2020-10-23 NOTE — TELEPHONE ENCOUNTER
Kendy from [facility] stated they have received the order for a wheelchair and will be in contact with patient

## 2020-11-01 ENCOUNTER — PATIENT MESSAGE (OUTPATIENT)
Dept: FAMILY MEDICINE | Facility: CLINIC | Age: 80
End: 2020-11-01

## 2020-11-02 ENCOUNTER — TELEPHONE (OUTPATIENT)
Dept: PAIN MEDICINE | Facility: CLINIC | Age: 80
End: 2020-11-02

## 2020-11-02 NOTE — TELEPHONE ENCOUNTER
Pt would like to be seen in [place] due to multiple falls. Scheduled for tomorrow at 10:30am. Pt verbalized understanding.

## 2020-11-02 NOTE — TELEPHONE ENCOUNTER
----- Message from Kareen Mari, Patient Care Assistant sent at 11/2/2020  4:31 PM CST -----  Regarding: appointment  Contact: patient  Type:  Sooner Apoointment Request    Caller is requesting a sooner appointment.  Caller declined first available appointment listed below.  Caller will not accept being placed on the waitlist and is requesting a message be sent to doctor.    Name of Caller:  patient   When is the first available appointment?  11/18/20  Symptoms:    Best Call Back Number:  [phone] (home)   Additional Information:  please call patient to advice. Thanks!

## 2020-11-03 ENCOUNTER — OFFICE VISIT (OUTPATIENT)
Dept: PAIN MEDICINE | Facility: CLINIC | Age: 80
End: 2020-11-03
Payer: MEDICARE

## 2020-11-03 ENCOUNTER — HOSPITAL ENCOUNTER (OUTPATIENT)
Dept: RADIOLOGY | Facility: HOSPITAL | Age: 80
Discharge: HOME | End: 2020-11-03
Attending: ANESTHESIOLOGY
Payer: MEDICARE

## 2020-11-03 ENCOUNTER — PATIENT OUTREACH (OUTPATIENT)
Dept: ADMINISTRATIVE | Facility: OTHER | Age: 80
End: 2020-11-03

## 2020-11-03 VITALS
HEART RATE: 81 BPM | HEIGHT: 61 IN | DIASTOLIC BLOOD PRESSURE: 67 MMHG | WEIGHT: 116 LBS | SYSTOLIC BLOOD PRESSURE: 114 MMHG | BODY MASS INDEX: 21.9 KG/M2

## 2020-11-03 DIAGNOSIS — M51.36 DDD (DEGENERATIVE DISC DISEASE), LUMBAR: ICD-10-CM

## 2020-11-03 DIAGNOSIS — M25.551 CHRONIC HIP PAIN, BILATERAL: ICD-10-CM

## 2020-11-03 DIAGNOSIS — G89.29 CHRONIC HIP PAIN, BILATERAL: ICD-10-CM

## 2020-11-03 DIAGNOSIS — M51.36 DDD (DEGENERATIVE DISC DISEASE), LUMBAR: Primary | ICD-10-CM

## 2020-11-03 DIAGNOSIS — M54.9 DORSALGIA, UNSPECIFIED: ICD-10-CM

## 2020-11-03 DIAGNOSIS — M47.896 OTHER SPONDYLOSIS, LUMBAR REGION: ICD-10-CM

## 2020-11-03 DIAGNOSIS — M25.552 CHRONIC HIP PAIN, BILATERAL: ICD-10-CM

## 2020-11-03 DIAGNOSIS — Z01.818 PRE-OP TESTING: ICD-10-CM

## 2020-11-03 DIAGNOSIS — M48.00 CENTRAL STENOSIS OF SPINAL CANAL: ICD-10-CM

## 2020-11-03 DIAGNOSIS — M48.00 CENTRAL STENOSIS OF SPINAL CANAL: Primary | ICD-10-CM

## 2020-11-03 PROCEDURE — 99214 OFFICE O/P EST MOD 30 MIN: CPT | Mod: PBBFAC,PN,25 | Performed by: ANESTHESIOLOGY

## 2020-11-03 PROCEDURE — 72110 X-RAY EXAM L-2 SPINE 4/>VWS: CPT | Mod: TC,FY

## 2020-11-03 PROCEDURE — 73521 X-RAY EXAM HIPS BI 2 VIEWS: CPT | Mod: TC,FY

## 2020-11-03 PROCEDURE — 99999 PR PBB SHADOW E&M-EST. PATIENT-LVL IV: CPT | Mod: PBBFAC,,, | Performed by: ANESTHESIOLOGY

## 2020-11-03 PROCEDURE — 99204 PR OFFICE/OUTPT VISIT, NEW, LEVL IV, 45-59 MIN: ICD-10-PCS | Mod: S$PBB,,, | Performed by: ANESTHESIOLOGY

## 2020-11-03 PROCEDURE — 99999 PR PBB SHADOW E&M-EST. PATIENT-LVL IV: ICD-10-PCS | Mod: PBBFAC,,, | Performed by: ANESTHESIOLOGY

## 2020-11-03 PROCEDURE — 72110 X-RAY EXAM L-2 SPINE 4/>VWS: CPT | Mod: 26,,, | Performed by: RADIOLOGY

## 2020-11-03 PROCEDURE — 72110 XR LUMBAR SPINE COMPLETE 5 VIEW: ICD-10-PCS | Mod: 26,,, | Performed by: RADIOLOGY

## 2020-11-03 PROCEDURE — 73521 X-RAY EXAM HIPS BI 2 VIEWS: CPT | Mod: 26,,, | Performed by: RADIOLOGY

## 2020-11-03 PROCEDURE — 73521 XR HIPS BILATERAL 2 VIEW INCL AP PELVIS: ICD-10-PCS | Mod: 26,,, | Performed by: RADIOLOGY

## 2020-11-03 PROCEDURE — 99204 OFFICE O/P NEW MOD 45 MIN: CPT | Mod: S$PBB,,, | Performed by: ANESTHESIOLOGY

## 2020-11-03 RX ORDER — MELOXICAM 7.5 MG/1
7.5 TABLET ORAL DAILY
Qty: 90 TABLET | Refills: 0 | Status: SHIPPED | OUTPATIENT
Start: 2020-11-03 | End: 2021-01-20

## 2020-11-03 NOTE — H&P (VIEW-ONLY)
Referring Physician: Delmar Mirza*    PCP: Gladis Mirza NP    CC: low back pain    HPI:   Maliha Jaramillo is a 79 y.o. female with PMH significant for chronic Xanax usage, DM II, HTN, and atrial fibrillation (not on anticoagulation other than ASA) presents as a referral for the evaluation of left sided back pain. The patient reports that her pain began approximately in September 2020 while doing yardwork and she fell and could not get up. Of note, the patient's son reports that the patient falls frequently, and she has fallen multiple times since her MRI in September 2020. The patient localizes her pain to her left buttock. The patient reports of radiation down the lateral aspect of her LLE to her ankle. The patient describes her pain as a sharp type of pain. The patient reports of numbness in her feet. The patient reports that her pain is a 5/10. Patient denies of any urinary/fecal incontinence or saddle anesthesia. The patient reports of generalized weakness throughout her entire body.     Aggravating factors: constant pain; walking; laying in bed    Mitigating factors: rest    Relevant Surgeries: no    Interventional Therapies: yes; injections in her knees but never in her back    : Reviewed and consistent with medication use as prescribed.      Non-pharmacologic Treatment:     · Physical Therapy: yes; did for balance but not specifically for her back - was not useful per patient   · Ice/Heat: yes; heating pad  · TENS: yes; used with some benefit   · Massage: yes; massage once per month with great benefit for two days; stopped since the pandemic  · Chiropractic care: yes; temporary benefit   · Acupuncture: no         Pain Medications:         · Currently taking: lyrica 25 mg PO BID, horse lineament (some benefit), OTC Aleve, Xanax BID     · Has tried in the past:    · Opioids: no  · NSAIDS: yes; aleve (reports of some benefit)  · Tylenol: no  · Muscle relaxants: no; avoids given  to frequent falls  · TCAs: no  · SNRIs: no  · Anticonvulsants: yes  · topical creams: yes; horse lineament     Anticoagulation: yes; ASA    ROS:  Review of Systems   Constitutional: Negative for chills and fever.   HENT: Negative for sore throat.    Eyes: Negative for visual disturbance.   Respiratory: Negative for shortness of breath.    Cardiovascular: Negative for chest pain.   Gastrointestinal: Negative for nausea and vomiting.   Genitourinary: Negative for difficulty urinating.   Musculoskeletal: Positive for arthralgias, back pain and gait problem.   Skin: Negative for rash.   Allergic/Immunologic: Negative for immunocompromised state.   Neurological: Positive for numbness. Negative for syncope.   Hematological: Does not bruise/bleed easily.   Psychiatric/Behavioral: Negative for suicidal ideas.        Past Medical History:   Diagnosis Date    Anxiety 1954    Arthritis 2013    Atrial fibrillation     Diabetes mellitus     Hyperlipidemia 2012    Hypertension     Hypothyroidism (acquired) 2004    Osteoporosis 07/2019    Tachycardia      Past Surgical History:   Procedure Laterality Date    APPENDECTOMY  1948    BREAST BIOPSY Right 1980's    benign    ESOPHAGOGASTRODUODENOSCOPY N/A 6/25/2020    Procedure: ESOPHAGOGASTRODUODENOSCOPY (EGD);  Surgeon: Ruben Adame MD;  Location: [facility];  Service: General;  Laterality: N/A;    HYSTERECTOMY  1978    TONSILLECTOMY  1945    TOTAL REDUCTION MAMMOPLASTY Bilateral 1987     Family History   Problem Relation Age of Onset    Cancer Sister         Adenocarcinoma    Stroke Father      Social History     Socioeconomic History    Marital status:      Spouse name: Not on file    Number of children: Not on file    Years of education: Not on file    Highest education level: Not on file   Occupational History    Not on file   Social Needs    Financial resource strain: Somewhat hard    Food insecurity     Worry: Often true     Inability: Often  "true    Transportation needs     Medical: No     Non-medical: No   Tobacco Use    Smoking status: Former Smoker    Smokeless tobacco: Never Used   Substance and Sexual Activity    Alcohol use: Yes     Frequency: 2-4 times a month     Drinks per session: 1 or 2     Binge frequency: Never     Comment: Couple of drinks per month    Drug use: No    Sexual activity: Not Currently   Lifestyle    Physical activity     Days per week: 0 days     Minutes per session: 10 min    Stress: To some extent   Relationships    Social connections     Talks on phone: More than three times a week     Gets together: More than three times a week     Attends [employer] service: Not on file     Active member of club or organization: No     Attends meetings of clubs or organizations: Never     Relationship status:    Other Topics Concern    Not on file   Social History Narrative    Not on file         Allergies: See med card    Vitals:    11/03/20 1048   BP: 114/67   Pulse: 81   Weight: 52.6 kg (116 lb)   Height: 5' 1" (1.549 m)   PainSc: 10-Worst pain ever   PainLoc: Back         Physical exam:  Physical Exam   Constitutional: She is oriented to person, place, and time and well-developed, well-nourished, and in no distress.   HENT:   Head: Normocephalic and atraumatic.   Eyes: Conjunctivae and EOM are normal. Right eye exhibits no discharge. Left eye exhibits no discharge.   Cardiovascular: Normal rate.   Pulmonary/Chest: Effort normal and breath sounds normal. No respiratory distress.   Abdominal: Soft.   Neurological: She is alert and oriented to person, place, and time.   Skin: Skin is warm and dry. No rash noted. She is not diaphoretic.   Psychiatric: Mood, memory, affect and judgment normal.   Nursing note and vitals reviewed.       UPPER EXTREMITIES: Normal alignment, normal range of motion, no atrophy, no skin changes,  hair growth and nail growth normal and equal bilaterally. No swelling, no tenderness.    LOWER " EXTREMITIES:  Normal alignment, normal range of motion, no atrophy, no skin changes,  hair growth and nail growth normal and equal bilaterally. No swelling, no tenderness.    LUMBAR SPINE  Lumbar spine: ROM is limited with flexion extension and oblique extension with increased pain with passive ROM.    Supine straight leg raise: unable to examine secondary to significant fall risk    ((--)) Facet loading   Myofascial exam: Tenderness to palpation across lumbar paraspinous muscles.    ((+)) TTP at the SI joint  SKIP's test: unable to examine secondary to significant fall risk   One leg stand: unable to examine secondary to significant fall risk     Distraction test: unable to examine secondary to significant fall risk     CRANIAL NERVES:  II:  PERRL bilaterally,   III,IV,VI: EOMI.    V:  Facial sensation equal bilaterally  VII:  Facial motor function normal.  VIII:  Hearing equal to finger rub bilaterally  IX/X: Gag normal, palate symmetric  XI:  Shoulder shrug equal, head turn equal  XII:  Tongue midline without fasciculations      MOTOR: Tone and bulk: normal bilateral upper and lower Strength: normal   Delt Bi Tri WE WF     R 5 5 5 5 5 5   L 5 5 5 5 5 5     IP ADD ABD Quad TA Gas HAM  R 5 5 5 5 5 5 5  L 5 5 5 5 5 5 5    SENSATION: Light touch and pinprick intact bilaterally  REFLEXES: normal, symmetric, nonbrisk.  Toes down, no clonus. Negative solis's sign bilaterally.  GAIT: normal rise, base, steps, and arm swing.        Imaging: MRI Lumbar spine without contrast (10/11/2020):              Assessment: Maliha Jaramillo is a 79 y.o. female with PMH significant for chronic Xanax usage, DM II, HTN, and atrial fibrillation (not on anticoagulation other than ASA) presents as a referral for the evaluation of left sided back pain. MRI report reviewed with the patient and the patient's son personally. MRI significant for severe spinal canal stenosis, multi-level DDD, multi-level facet arthropathy, and neural  foraminal stenosis. I believe all of the aforementioned pathologies are contributing to her current pain. I am also concerned for the possibility of lumbar spine compression and/or hip fracture as the patient has fallen multiple times since her last MRI. Treatment plan outlined below.     Plan:  - Schedule for L4-L5 lumbar interlaminar epidural steroid injection   - Ordered bilateral hip plain films and x-ray of the lumbar spine for further evaluation given her history of frequent falls  - Ordered Home health for PT/OT given patient debility  - Prescribed mobic 7.5 mg PO q day for inflammation and pain; instructed the patient and her son to discontinue Aleve while taking mobic  - I have stressed the importance of physical activity and a home exercise plan to help with chronic pain and improve health.  - RTC for the procedure as outlined above    Thank you for referring this interesting patient, and I look forward to continuing to collaborate in her care.    Dereck Cervantes MD  Pain Management

## 2020-11-03 NOTE — PROGRESS NOTES
Referring Physician: Delmar Mirza*    PCP: Gladis Mirza NP    CC: low back pain    HPI:   Maliha Jaramillo is a 79 y.o. female with PMH significant for chronic Xanax usage, DM II, HTN, and atrial fibrillation (not on anticoagulation other than ASA) presents as a referral for the evaluation of left sided back pain. The patient reports that her pain began approximately in September 2020 while doing yardwork and she fell and could not get up. Of note, the patient's son reports that the patient falls frequently, and she has fallen multiple times since her MRI in September 2020. The patient localizes her pain to her left buttock. The patient reports of radiation down the lateral aspect of her LLE to her ankle. The patient describes her pain as a sharp type of pain. The patient reports of numbness in her feet. The patient reports that her pain is a 5/10. Patient denies of any urinary/fecal incontinence or saddle anesthesia. The patient reports of generalized weakness throughout her entire body.     Aggravating factors: constant pain; walking; laying in bed    Mitigating factors: rest    Relevant Surgeries: no    Interventional Therapies: yes; injections in her knees but never in her back    : Reviewed and consistent with medication use as prescribed.      Non-pharmacologic Treatment:     · Physical Therapy: yes; did for balance but not specifically for her back - was not useful per patient   · Ice/Heat: yes; heating pad  · TENS: yes; used with some benefit   · Massage: yes; massage once per month with great benefit for two days; stopped since the pandemic  · Chiropractic care: yes; temporary benefit   · Acupuncture: no         Pain Medications:         · Currently taking: lyrica 25 mg PO BID, horse lineament (some benefit), OTC Aleve, Xanax BID     · Has tried in the past:    · Opioids: no  · NSAIDS: yes; aleve (reports of some benefit)  · Tylenol: no  · Muscle relaxants: no; avoids given  to frequent falls  · TCAs: no  · SNRIs: no  · Anticonvulsants: yes  · topical creams: yes; horse lineament     Anticoagulation: yes; ASA    ROS:  Review of Systems   Constitutional: Negative for chills and fever.   HENT: Negative for sore throat.    Eyes: Negative for visual disturbance.   Respiratory: Negative for shortness of breath.    Cardiovascular: Negative for chest pain.   Gastrointestinal: Negative for nausea and vomiting.   Genitourinary: Negative for difficulty urinating.   Musculoskeletal: Positive for arthralgias, back pain and gait problem.   Skin: Negative for rash.   Allergic/Immunologic: Negative for immunocompromised state.   Neurological: Positive for numbness. Negative for syncope.   Hematological: Does not bruise/bleed easily.   Psychiatric/Behavioral: Negative for suicidal ideas.        Past Medical History:   Diagnosis Date    Anxiety 1954    Arthritis 2013    Atrial fibrillation     Diabetes mellitus     Hyperlipidemia 2012    Hypertension     Hypothyroidism (acquired) 2004    Osteoporosis 07/2019    Tachycardia      Past Surgical History:   Procedure Laterality Date    APPENDECTOMY  1948    BREAST BIOPSY Right 1980's    benign    ESOPHAGOGASTRODUODENOSCOPY N/A 6/25/2020    Procedure: ESOPHAGOGASTRODUODENOSCOPY (EGD);  Surgeon: Ruben Adame MD;  Location: [facility];  Service: General;  Laterality: N/A;    HYSTERECTOMY  1978    TONSILLECTOMY  1945    TOTAL REDUCTION MAMMOPLASTY Bilateral 1987     Family History   Problem Relation Age of Onset    Cancer Sister         Adenocarcinoma    Stroke Father      Social History     Socioeconomic History    Marital status:      Spouse name: Not on file    Number of children: Not on file    Years of education: Not on file    Highest education level: Not on file   Occupational History    Not on file   Social Needs    Financial resource strain: Somewhat hard    Food insecurity     Worry: Often true     Inability: Often  "true    Transportation needs     Medical: No     Non-medical: No   Tobacco Use    Smoking status: Former Smoker    Smokeless tobacco: Never Used   Substance and Sexual Activity    Alcohol use: Yes     Frequency: 2-4 times a month     Drinks per session: 1 or 2     Binge frequency: Never     Comment: Couple of drinks per month    Drug use: No    Sexual activity: Not Currently   Lifestyle    Physical activity     Days per week: 0 days     Minutes per session: 10 min    Stress: To some extent   Relationships    Social connections     Talks on phone: More than three times a week     Gets together: More than three times a week     Attends Yarsanism service: Not on file     Active member of club or organization: No     Attends meetings of clubs or organizations: Never     Relationship status:    Other Topics Concern    Not on file   Social History Narrative    Not on file         Allergies: See med card    Vitals:    11/03/20 1048   BP: 114/67   Pulse: 81   Weight: 52.6 kg (116 lb)   Height: 5' 1" (1.549 m)   PainSc: 10-Worst pain ever   PainLoc: Back         Physical exam:  Physical Exam   Constitutional: She is oriented to person, place, and time and well-developed, well-nourished, and in no distress.   HENT:   Head: Normocephalic and atraumatic.   Eyes: Conjunctivae and EOM are normal. Right eye exhibits no discharge. Left eye exhibits no discharge.   Cardiovascular: Normal rate.   Pulmonary/Chest: Effort normal and breath sounds normal. No respiratory distress.   Abdominal: Soft.   Neurological: She is alert and oriented to person, place, and time.   Skin: Skin is warm and dry. No rash noted. She is not diaphoretic.   Psychiatric: Mood, memory, affect and judgment normal.   Nursing note and vitals reviewed.       UPPER EXTREMITIES: Normal alignment, normal range of motion, no atrophy, no skin changes,  hair growth and nail growth normal and equal bilaterally. No swelling, no tenderness.    LOWER " EXTREMITIES:  Normal alignment, normal range of motion, no atrophy, no skin changes,  hair growth and nail growth normal and equal bilaterally. No swelling, no tenderness.    LUMBAR SPINE  Lumbar spine: ROM is limited with flexion extension and oblique extension with increased pain with passive ROM.    Supine straight leg raise: unable to examine secondary to significant fall risk    ((--)) Facet loading   Myofascial exam: Tenderness to palpation across lumbar paraspinous muscles.    ((+)) TTP at the SI joint  SKIP's test: unable to examine secondary to significant fall risk   One leg stand: unable to examine secondary to significant fall risk     Distraction test: unable to examine secondary to significant fall risk     CRANIAL NERVES:  II:  PERRL bilaterally,   III,IV,VI: EOMI.    V:  Facial sensation equal bilaterally  VII:  Facial motor function normal.  VIII:  Hearing equal to finger rub bilaterally  IX/X: Gag normal, palate symmetric  XI:  Shoulder shrug equal, head turn equal  XII:  Tongue midline without fasciculations      MOTOR: Tone and bulk: normal bilateral upper and lower Strength: normal   Delt Bi Tri WE WF     R 5 5 5 5 5 5   L 5 5 5 5 5 5     IP ADD ABD Quad TA Gas HAM  R 5 5 5 5 5 5 5  L 5 5 5 5 5 5 5    SENSATION: Light touch and pinprick intact bilaterally  REFLEXES: normal, symmetric, nonbrisk.  Toes down, no clonus. Negative solis's sign bilaterally.  GAIT: normal rise, base, steps, and arm swing.        Imaging: MRI Lumbar spine without contrast (10/11/2020):              Assessment: Maliha Jaramillo is a 79 y.o. female with PMH significant for chronic Xanax usage, DM II, HTN, and atrial fibrillation (not on anticoagulation other than ASA) presents as a referral for the evaluation of left sided back pain. MRI report reviewed with the patient and the patient's son personally. MRI significant for severe spinal canal stenosis, multi-level DDD, multi-level facet arthropathy, and neural  foraminal stenosis. I believe all of the aforementioned pathologies are contributing to her current pain. I am also concerned for the possibility of lumbar spine compression and/or hip fracture as the patient has fallen multiple times since her last MRI. Treatment plan outlined below.     Plan:  - Schedule for L4-L5 lumbar interlaminar epidural steroid injection   - Ordered bilateral hip plain films and x-ray of the lumbar spine for further evaluation given her history of frequent falls  - Ordered Home health for PT/OT given patient debility  - Prescribed mobic 7.5 mg PO q day for inflammation and pain; instructed the patient and her son to discontinue Aleve while taking mobic  - I have stressed the importance of physical activity and a home exercise plan to help with chronic pain and improve health.  - RTC for the procedure as outlined above    Thank you for referring this interesting patient, and I look forward to continuing to collaborate in her care.    Dereck Cervantes MD  Pain Management

## 2020-11-03 NOTE — LETTER
November 3, 2020      Gladis Mirza, NP  [address]  [address]  [place] [address]           [facility]  [address] [place] [address] [place]   [place] [address]  Phone: [phone]  Fax: [phone]          Patient: Maliha Jaramillo   MR Number: [id number]   YOB: 1940   Date of Visit: 11/3/2020       Dear Gladis Mirza:    Thank you for referring Maliha Jaramillo to me for evaluation. Attached you will find relevant portions of my assessment and plan of care.    If you have questions, please do not hesitate to call me. I look forward to following Maliha Jaramillo along with you.    Sincerely,    Dereck Cervantes MD    Enclosure  CC:  No Recipients    If you would like to receive this communication electronically, please contact [email or web address] or [phone] to request more information on [facility] access.    For providers and/or their staff who would like to refer a patient to [facility], please contact us through our one-stop-shop provider referral line, [facility], at [phone].    If you feel you have received this communication in error or would no longer like to receive these types of communications, please e-mail [email or web address]

## 2020-11-05 ENCOUNTER — TELEPHONE (OUTPATIENT)
Dept: FAMILY MEDICINE | Facility: CLINIC | Age: 80
End: 2020-11-05

## 2020-11-05 PROCEDURE — G0180 MD CERTIFICATION HHA PATIENT: HCPCS | Mod: ,,, | Performed by: ANESTHESIOLOGY

## 2020-11-05 PROCEDURE — G0180 PR HOME HEALTH MD CERTIFICATION: ICD-10-PCS | Mod: ,,, | Performed by: ANESTHESIOLOGY

## 2020-11-05 NOTE — TELEPHONE ENCOUNTER
Faxed wheel chair order to [facility] [phone]  Phone no. [phone]. Son is aware.          ----- Message from Camryn Rose sent at 11/5/2020  1:28 PM CST -----  Regarding: Ryan with [facility] [phone]  Type: Needs Medical Advice  Who Called:  Ryan  Symptoms (please be specific):    How long has patient had these symptoms:    Pharmacy name and phone #:    [facility] Call [facility] Number: [phone] (home) [phone](son phone)    Additional Information: Ryan with [facility] need order for a manual wheelchair pls to [facility] or [facility]. [facility] was very rude to the pt son they want to switch company. Ryan stated this chair need as soon as possible due pt falls thanks

## 2020-11-09 ENCOUNTER — LAB VISIT (OUTPATIENT)
Dept: LAB | Facility: HOSPITAL | Age: 80
End: 2020-11-09
Attending: NURSE PRACTITIONER
Payer: MEDICARE

## 2020-11-09 DIAGNOSIS — E11.42 TYPE 2 DIABETES MELLITUS WITH DIABETIC POLYNEUROPATHY, WITHOUT LONG-TERM CURRENT USE OF INSULIN: ICD-10-CM

## 2020-11-09 LAB
ANION GAP SERPL CALC-SCNC: 10 MMOL/L (ref 8–16)
BUN SERPL-MCNC: 22 MG/DL (ref 8–23)
CALCIUM SERPL-MCNC: 9.5 MG/DL (ref 8.7–10.5)
CHLORIDE SERPL-SCNC: 102 MMOL/L (ref 95–110)
CO2 SERPL-SCNC: 25 MMOL/L (ref 23–29)
CREAT SERPL-MCNC: 1.3 MG/DL (ref 0.5–1.4)
EST. GFR  (AFRICAN AMERICAN): 44.5 ML/MIN/1.73 M^2
EST. GFR  (NON AFRICAN AMERICAN): 38.6 ML/MIN/1.73 M^2
ESTIMATED AVG GLUCOSE: 226 MG/DL (ref 68–131)
GLUCOSE SERPL-MCNC: 108 MG/DL (ref 70–110)
HBA1C MFR BLD HPLC: 9.5 % (ref 4.5–6.2)
POTASSIUM SERPL-SCNC: 3.9 MMOL/L (ref 3.5–5.1)
SODIUM SERPL-SCNC: 137 MMOL/L (ref 136–145)

## 2020-11-09 PROCEDURE — 84443 ASSAY THYROID STIM HORMONE: CPT

## 2020-11-09 PROCEDURE — 36415 COLL VENOUS BLD VENIPUNCTURE: CPT

## 2020-11-09 PROCEDURE — 80048 BASIC METABOLIC PNL TOTAL CA: CPT

## 2020-11-09 PROCEDURE — 83036 HEMOGLOBIN GLYCOSYLATED A1C: CPT

## 2020-11-09 PROCEDURE — 84439 ASSAY OF FREE THYROXINE: CPT

## 2020-11-13 ENCOUNTER — OFFICE VISIT (OUTPATIENT)
Dept: FAMILY MEDICINE | Facility: CLINIC | Age: 80
End: 2020-11-13
Payer: MEDICARE

## 2020-11-13 ENCOUNTER — EXTERNAL HOME HEALTH (OUTPATIENT)
Dept: HOME HEALTH SERVICES | Facility: HOSPITAL | Age: 80
End: 2020-11-13
Payer: MEDICARE

## 2020-11-13 VITALS
WEIGHT: 111.81 LBS | OXYGEN SATURATION: 96 % | RESPIRATION RATE: 15 BRPM | HEART RATE: 84 BPM | HEIGHT: 61 IN | TEMPERATURE: 97 F | SYSTOLIC BLOOD PRESSURE: 115 MMHG | DIASTOLIC BLOOD PRESSURE: 70 MMHG | BODY MASS INDEX: 21.11 KG/M2

## 2020-11-13 DIAGNOSIS — E11.9 ENCOUNTER FOR DIABETIC FOOT EXAM: ICD-10-CM

## 2020-11-13 DIAGNOSIS — F41.9 ANXIETY: ICD-10-CM

## 2020-11-13 DIAGNOSIS — E11.42 TYPE 2 DIABETES MELLITUS WITH DIABETIC POLYNEUROPATHY, WITHOUT LONG-TERM CURRENT USE OF INSULIN: Primary | ICD-10-CM

## 2020-11-13 LAB
T4 FREE SERPL-MCNC: 1.39 NG/DL (ref 0.71–1.51)
TSH SERPL DL<=0.005 MIU/L-ACNC: 2 UIU/ML (ref 0.34–5.6)

## 2020-11-13 PROCEDURE — 99214 PR OFFICE/OUTPT VISIT, EST, LEVL IV, 30-39 MIN: ICD-10-PCS | Mod: S$GLB,,, | Performed by: NURSE PRACTITIONER

## 2020-11-13 PROCEDURE — 99214 OFFICE O/P EST MOD 30 MIN: CPT | Mod: S$GLB,,, | Performed by: NURSE PRACTITIONER

## 2020-11-13 RX ORDER — INSULIN GLARGINE 100 [IU]/ML
10 INJECTION, SOLUTION SUBCUTANEOUS NIGHTLY
Qty: 4 SYRINGE | Refills: 3 | Status: SHIPPED | OUTPATIENT
Start: 2020-11-13 | End: 2020-11-20

## 2020-11-13 RX ORDER — PEN NEEDLE, DIABETIC 30 GX3/16"
1 NEEDLE, DISPOSABLE MISCELLANEOUS NIGHTLY
Qty: 200 EACH | Refills: 4 | Status: SHIPPED | OUTPATIENT
Start: 2020-11-13 | End: 2022-01-01 | Stop reason: SDUPTHER

## 2020-11-13 RX ORDER — ALPRAZOLAM 0.5 MG/1
0.5 TABLET ORAL 2 TIMES DAILY
Qty: 60 TABLET | Refills: 2 | Status: SHIPPED | OUTPATIENT
Start: 2020-11-13 | End: 2020-12-22

## 2020-11-13 RX ORDER — LEVOTHYROXINE SODIUM 75 UG/1
TABLET ORAL
COMMUNITY
Start: 2020-11-10 | End: 2021-08-25

## 2020-11-13 NOTE — PROGRESS NOTES
Subjective:       Patient ID: Maliha Jaramillo is a 79 y.o. female.    Chief Complaint: Follow-up  80 y/o female presents for routine follow up. Recent labs discussed with A1c mildly increased from 8.6 to 9.5% taking glipizide and trulicity. She has a h/o not tolerating metformin at all d/t GI distress. She did well with Jardance but stopped due to cost but can not resume due to her renal function thus will start on lantus 10 units qhs.  Reports am glucose 175 average thus encouraged to check a couple times a week with goal to be < 120 in the am.     Tsh and free T4 abnormal in April thus will add labs to blood in lab.    She received her wheel chair. She reports falling a lot lately. Son and pt reports after bilateral knee injection her knees began to buckle and that is when she has increased falls. She is scheduled to undergo spinal injection on Monday with Dr. Cervatnes for chronic back pain. She is hoping it will help and she can resume her gardening.     Anxiety discussed with pt to report taking xanax bid and does not feel she needs the dose increased. She reports the medication takes the edge off of her nerves.     Health maintenance discussed with DM eye exam due yet gets it done in [place] and will schedule an appt. Foot exam done last appt and vaccines to be obtained at the pharmacy.       Past Medical History:   Diagnosis Date    Anxiety 1954    Arthritis 2013    Atrial fibrillation     Diabetes mellitus     Hyperlipidemia 2012    Hypertension     Hypothyroidism (acquired) 2004    Osteoporosis 07/2019    Tachycardia        Past Surgical History:   Procedure Laterality Date    APPENDECTOMY  1948    BREAST BIOPSY Right 1980's    benign    ESOPHAGOGASTRODUODENOSCOPY N/A 6/25/2020    Procedure: ESOPHAGOGASTRODUODENOSCOPY (EGD);  Surgeon: Ruben Adame MD;  Location: [facility];  Service: General;  Laterality: N/A;    HYSTERECTOMY  1978    TONSILLECTOMY  1945    TOTAL REDUCTION MAMMOPLASTY  Bilateral 1987        Social History     Socioeconomic History    Marital status:      Spouse name: Not on file    Number of children: Not on file    Years of education: Not on file    Highest education level: Not on file   Occupational History    Not on file   Social Needs    Financial resource strain: Somewhat hard    Food insecurity     Worry: Often true     Inability: Often true    Transportation needs     Medical: No     Non-medical: No   Tobacco Use    Smoking status: Former Smoker    Smokeless tobacco: Never Used   Substance and Sexual Activity    Alcohol use: Yes     Frequency: 2-4 times a month     Drinks per session: 1 or 2     Binge frequency: Never     Comment: Couple of drinks per month    Drug use: No    Sexual activity: Not Currently   Lifestyle    Physical activity     Days per week: 0 days     Minutes per session: 10 min    Stress: To some extent   Relationships    Social connections     Talks on phone: More than three times a week     Gets together: More than three times a week     Attends [employer] service: Not on file     Active member of club or organization: No     Attends meetings of clubs or organizations: Never     Relationship status:    Other Topics Concern    Not on file   Social History Narrative    Not on file       Family History   Problem Relation Age of Onset    Cancer Sister         Adenocarcinoma    Stroke Father        Review of patient's allergies indicates:   Allergen Reactions    Flu vaccine [id number](5 yr up) Anaphylaxis     Patient's left side of the face is severely swollen on the left side then became unresponsive.     Pneumococcal vaccine Anaphylaxis     Patient's left side of the face is severely swollen on the left side then became unresponsive. Had flu shot same day.     Ace inhibitors Other (See Comments)     cough          Current Outpatient Medications:     ALPRAZolam (XANAX) 0.5 MG tablet, Take 1 tablet (0.5 mg total) by mouth 2  "(two) times daily. as needed for anxiety., Disp: 60 tablet, Rfl: 2    atorvastatin (LIPITOR) 40 MG tablet, Take 1 tablet (40 mg total) by mouth once daily., Disp: 90 tablet, Rfl: 3    carvediloL (COREG) 6.25 MG tablet, Take 1 tablet (6.25 mg total) by mouth 2 (two) times daily with meals. Can not afford bystolic, Disp: 180 tablet, Rfl: 1    diltiaZEM (CARDIZEM CD) 300 MG 24 hr capsule, Take 300 mg by mouth once daily., Disp: , Rfl:     dulaglutide (TRULICITY) 1.5 mg/0.5 mL PnIj, Inject 1.5 mg into the skin every 7 days., Disp: 4 Syringe, Rfl: 5    estradioL (ESTRACE) 0.01 % (0.1 mg/gram) vaginal cream, INSERT 1 GRAM OF CREAM VAGINALLY ONCE DAILY, Disp: 43 g, Rfl: 1    EUTHYROX 75 mcg tablet, , Disp: , Rfl:     glipiZIDE (GLUCOTROL) 10 MG tablet, TAKE 1 TABLET BY MOUTH TWICE DAILY BEFORE MEALS, Disp: 180 tablet, Rfl: 3    hydrALAZINE (APRESOLINE) 25 MG tablet, , Disp: , Rfl:     imipramine (TOFRANIL) 50 MG tablet, TAKE 1 TABLET BY MOUTH ONCE DAILY IN THE EVENING, Disp: 90 tablet, Rfl: 3    meloxicam (MOBIC) 7.5 MG tablet, Take 1 tablet (7.5 mg total) by mouth once daily., Disp: 90 tablet, Rfl: 0    pantoprazole (PROTONIX) 40 MG tablet, Take 1 tablet (40 mg total) by mouth once daily. Take in the morning before breakfast.  Wait 30 minutes before eating or drinking anything, Disp: 30 tablet, Rfl: 11    pregabalin (LYRICA) 25 MG capsule, Take 1 capsule by mouth twice daily, Disp: 60 capsule, Rfl: 5    valsartan (DIOVAN) 40 MG tablet, Take 1 tablet (40 mg total) by mouth once daily., Disp: 90 tablet, Rfl: 3    alendronate (FOSAMAX) 70 MG tablet, Take 1 tablet (70 mg total) by mouth every 7 days., Disp: 4 tablet, Rfl: 11    insulin (LANTUS SOLOSTAR U-100 INSULIN) glargine 100 units/mL (3mL) SubQ pen, Inject 10 Units into the skin every evening., Disp: 4 Syringe, Rfl: 3    pen needle, diabetic (1ST TIER UNIFINE PENTIPS) 32 gauge x 5/32" Ndle, 1 Device by Misc.(Non-Drug; Combo Route) route every evening., " Disp: 200 each, Rfl: 4    HPI  Review of Systems   Constitutional: Negative.    HENT: Negative.    Eyes: Negative.    Respiratory: Negative.    Cardiovascular: Negative.    Gastrointestinal: Negative.    Endocrine: Negative.    Genitourinary: Negative.    Musculoskeletal: Positive for arthralgias and back pain.   Skin: Negative.    Allergic/Immunologic: Negative.    Neurological: Positive for weakness.   Hematological: Negative.    Psychiatric/Behavioral: Negative.        Objective:      Physical Exam  Vitals signs reviewed.   Constitutional:       Appearance: Normal appearance. She is well-developed and normal weight.   HENT:      Head: Normocephalic.   Eyes:      Conjunctiva/sclera: Conjunctivae normal.      Pupils: Pupils are equal, round, and reactive to light.   Neck:      Musculoskeletal: Normal range of motion and neck supple.      Thyroid: No thyromegaly.   Cardiovascular:      Rate and Rhythm: Normal rate and regular rhythm.      Heart sounds: Normal heart sounds. No murmur.   Pulmonary:      Effort: Pulmonary effort is normal.      Breath sounds: Normal breath sounds. No wheezing or rales.   Abdominal:      General: Bowel sounds are normal. There is no distension.      Palpations: Abdomen is soft.      Tenderness: There is no abdominal tenderness.   Musculoskeletal: Normal range of motion.   Skin:     General: Skin is warm and dry.      Capillary Refill: Capillary refill takes less than 2 seconds.   Neurological:      Mental Status: She is alert and oriented to person, place, and time. Mental status is at baseline.      Motor: Weakness present.      Gait: Gait abnormal.   Psychiatric:         Mood and Affect: Mood normal.         Behavior: Behavior normal.         Thought Content: Thought content normal.         Judgment: Judgment normal.         Assessment:       1. Type 2 diabetes mellitus with diabetic polyneuropathy, without long-term current use of insulin    2. Anxiety    3. Encounter for diabetic  "foot exam        Plan:       1- foot exam done at last visit (8/2020)  2- start lantus at 10 units qhs  3- xanax refilled  4- add TSH and Free T4 to blood in lab  -  Type 2 diabetes mellitus with diabetic polyneuropathy, without long-term current use of insulin  -     insulin (LANTUS SOLOSTAR U-100 INSULIN) glargine 100 units/mL (3mL) SubQ pen; Inject 10 Units into the skin every evening.  Dispense: 4 Syringe; Refill: 3  -     pen needle, diabetic (1ST TIER UNIFINE PENTIPS) 32 gauge x 5/32" Ndle; 1 Device by Misc.(Non-Drug; Combo Route) route every evening.  Dispense: 200 each; Refill: 4  -     TSH; Future; Expected date: 11/13/2020  -     T4, Free; Future; Expected date: 11/13/2020    Anxiety  -     ALPRAZolam (XANAX) 0.5 MG tablet; Take 1 tablet (0.5 mg total) by mouth 2 (two) times daily. as needed for anxiety.  Dispense: 60 tablet; Refill: 2    Encounter for diabetic foot exam  -     Foot Exam Performed        Risks, benefits, and side effects were discussed with the patient. All questions were answered to the fullest satisfaction of the patient, and pt verbalized understanding and agreement to treatment plan. Pt was to call with any new or worsening symptoms, or present to the ER.             "

## 2020-11-16 ENCOUNTER — HOSPITAL ENCOUNTER (OUTPATIENT)
Facility: AMBULARY SURGERY CENTER | Age: 80
Discharge: HOME | End: 2020-11-16
Attending: ANESTHESIOLOGY | Admitting: ANESTHESIOLOGY
Payer: MEDICARE

## 2020-11-16 DIAGNOSIS — M51.36 DEGENERATIVE DISC DISEASE, LUMBAR: Primary | ICD-10-CM

## 2020-11-16 PROBLEM — M51.369 DEGENERATIVE DISC DISEASE, LUMBAR: Status: ACTIVE | Noted: 2020-11-16

## 2020-11-16 LAB
GLUCOSE SERPL-MCNC: 210 MG/DL (ref 70–110)
POCT GLUCOSE: 210 MG/DL (ref 70–110)

## 2020-11-16 PROCEDURE — 62323 PR INJ LUMBAR/SACRAL, W/IMAGING GUIDANCE: ICD-10-PCS | Mod: ,,, | Performed by: ANESTHESIOLOGY

## 2020-11-16 PROCEDURE — 62323 NJX INTERLAMINAR LMBR/SAC: CPT | Mod: ,,, | Performed by: ANESTHESIOLOGY

## 2020-11-16 PROCEDURE — 62323 NJX INTERLAMINAR LMBR/SAC: CPT | Performed by: ANESTHESIOLOGY

## 2020-11-16 RX ORDER — SODIUM CHLORIDE 9 MG/ML
INJECTION, SOLUTION INTRAMUSCULAR; INTRAVENOUS; SUBCUTANEOUS
Status: DISCONTINUED | OUTPATIENT
Start: 2020-11-16 | End: 2020-11-16 | Stop reason: HOSPADM

## 2020-11-16 RX ORDER — SODIUM CHLORIDE, SODIUM LACTATE, POTASSIUM CHLORIDE, CALCIUM CHLORIDE 600; 310; 30; 20 MG/100ML; MG/100ML; MG/100ML; MG/100ML
INJECTION, SOLUTION INTRAVENOUS ONCE AS NEEDED
Status: DISCONTINUED | OUTPATIENT
Start: 2020-11-16 | End: 2020-11-16 | Stop reason: HOSPADM

## 2020-11-16 RX ORDER — ALPRAZOLAM 0.5 MG/1
0.5 TABLET, ORALLY DISINTEGRATING ORAL ONCE
Status: COMPLETED | OUTPATIENT
Start: 2020-11-16 | End: 2020-11-16

## 2020-11-16 RX ORDER — LIDOCAINE HYDROCHLORIDE 10 MG/ML
INJECTION, SOLUTION EPIDURAL; INFILTRATION; INTRACAUDAL; PERINEURAL
Status: DISCONTINUED | OUTPATIENT
Start: 2020-11-16 | End: 2020-11-16 | Stop reason: HOSPADM

## 2020-11-16 RX ORDER — DEXAMETHASONE SODIUM PHOSPHATE 10 MG/ML
INJECTION INTRAMUSCULAR; INTRAVENOUS
Status: DISCONTINUED | OUTPATIENT
Start: 2020-11-16 | End: 2020-11-16 | Stop reason: HOSPADM

## 2020-11-16 RX ADMIN — ALPRAZOLAM 0.5 MG: 0.5 TABLET, ORALLY DISINTEGRATING ORAL at 11:11

## 2020-11-16 NOTE — DISCHARGE SUMMARY
[facility]  Discharge Note  Short Stay    Procedure(s) (LRB):  Injection-steroid-epidural-lumbar (N/A)    OUTCOME: Patient tolerated treatment/procedure well without complication and is now ready for discharge.    DISPOSITION: Home or Self Care    FINAL DIAGNOSIS:  Degenerative disc disease, lumbar    FOLLOWUP: In clinic    DISCHARGE INSTRUCTIONS:    Discharge Procedure Orders   Diet general     Call MD for:  temperature >100.4     Call MD for:  persistent nausea and vomiting     Call MD for:  severe uncontrolled pain     Call MD for:  difficulty breathing, headache or visual disturbances     Call MD for:  redness, tenderness, or signs of infection (pain, swelling, redness, odor or green/yellow discharge around incision site)     Call MD for:  hives     Call MD for:  persistent dizziness or light-headedness     Call MD for:  extreme fatigue

## 2020-11-16 NOTE — INTERVAL H&P NOTE

## 2020-11-16 NOTE — OP NOTE
PROCEDURE DATE: 11/16/2020    PROCEDURE: Lumbar interlaminar epidural steroid injection at L4-L5 under fluoroscopic guidance (left paramedian approach).    DIAGNOSIS: LUMBAR DISC DISPLACEMENT WITHOUT MYELOPATHY  POSTOP DIAGNOSIS: SAME    PHYSICIAN: Dereck Cervantes M.D.    MEDICATIONS INJECTED: 10 mg dexamethasone with 4 ml of preservative free NaCl    LOCAL ANESTHETIC INJECTED:    Lidocaine 1% 3 ml total    SEDATION MEDICATIONS: oral sedation    ESTIMATED BLOOD LOSS:  none    COMPLICATIONS:  none    TECHNIQUE:  Time-out taken to identify patient and procedure prior to starting the procedure.  With the patient laying in a prone position, the area was prepped and draped in the usual sterile fashion using ChloraPrep and a fenestrated drape.  After determining the target level with an AP fluoroscopic view, local anesthetic was given using a 25-gauge 1.5 inch needle by raising a wheal and then infiltrating toward the interlaminar entry space.  A 3.5 inch 20-gauge Touhy needle was introduced under AP fluoroscopic guidance to the interlaminar space of L4-L5. Once the trajectory was established, the needle was visualized in the lateral view and advanced using loss of resistance technique. Once in the desired position, 2 mL contrast was injected to confirm placement and there was no vascular uptake nor intrathecal spread.  The medication was then injected slowly. The patient tolerated the procedure well.      The patient was monitored after the procedure.   They were given post-procedure and discharge instructions to follow at home.  The patient was discharged in a stable condition.

## 2020-11-16 NOTE — PLAN OF CARE
Dc criteria met. Denies pain. Dr Cervantes spoke with pt and son prior to DC.Pt return to preop level of strength and function. Home with son driving via [place] to exit and car.

## 2020-11-16 NOTE — DISCHARGE INSTRUCTIONS
Before leaving, please make sure you have all your personal belongings such as glasses, purses, wallets, keys, cell phones, jewelry, jackets etc     Pain injection instructions:     This procedure may take a couple weeks to relieve pain  You may get some pain relief from the local anesthetic initally.   Steroids can have side effects of flushed face or nervous feeling.    No driving for 24 hrs.   Activity as tolerated- gradually increase activities.  Dont lift over 10 lbs for 24 hrs   No heat at injection sites for 2 full days. No heating pads, hot tubs, saunas, or swimming in any body of water or pool for 2 full days.  Use ice pack for mild swelling and for comfort , apply for 20 minutes, remove for 20 minute intervals. No direct contact of ice itself  to skin.  May shower today.  Do not allow shower water to beat on injections site(s) for 2 full days. No tub baths for two full days.      Resume Aspirin, Plavix, or Coumadin the day after the procedure unless otherwise instructed.   If diabetic,monitor your glucose carefully as steroids can increase your glucose level    Seek immediate medical help for:   Severe increase in your usual pain or appearance of new pain.  Prolonged (more than 8 hours) or increasing weakness or numbness in the legs or arms.   .    Fever above 100.4 degrees F ,Drainage,redness,active bleeding, or increased swelling at the injection site.  Headache, shortness of breath, chest pain, or breathing problems.    After Surgery:  Always be aware that any surgery can cause these symptoms:    Pain- Medication can be prescribed for pain to decrease your pain but may not completely take your pain away. Over the Counter pain medicine my be enough and you can always use Ice and rest to help ease pain.    Bleeding- a little bleeding after a surgery is usually within normal.  If there is a lot of blood you need to notify your MD.  Emergency treatments of bleeding are cold application, elevation of the  bleeding site and compression.    Infection- Infection after surgery is NOT a normal occurrence.  Signs of infection are fever, swelling, hot to touch the incision.  If this occurs notify your MD immediately.    Nausea- this can be common after a surgery especially if you have had anesthesia medicine or are taking pain medicine.  Steroids have a side effect of nausea sometimes. Staying on clear liquids, bland foods, gingerale, or over the counter anti nausea medicines can help.  If you vomit more than once, notify your MD.  Anti Nausea medicines can be prescribed.

## 2020-11-17 ENCOUNTER — TELEPHONE (OUTPATIENT)
Dept: FAMILY MEDICINE | Facility: CLINIC | Age: 80
End: 2020-11-17

## 2020-11-17 VITALS
DIASTOLIC BLOOD PRESSURE: 75 MMHG | HEART RATE: 84 BPM | SYSTOLIC BLOOD PRESSURE: 134 MMHG | TEMPERATURE: 100 F | WEIGHT: 116 LBS | BODY MASS INDEX: 21.9 KG/M2 | HEIGHT: 61 IN | RESPIRATION RATE: 18 BRPM | OXYGEN SATURATION: 98 %

## 2020-11-17 NOTE — TELEPHONE ENCOUNTER
----- Message from Princess AIDA Pittman sent at 11/17/2020  2:12 PM CST -----  Contact: [facility]  Type: Needs Medical Advice  Who Called:  Markie lynch/ [facility] Call Back Number:   Additional Information: requesting a call in regards to patient blood sugar [phone].  She takes Glipizide which she took this morning, and she take trulicity on Friday weekly , heart rate is elevated 146 cardiology already spoken to her. Patient is at home. Please give a call to advise what needs to be done.

## 2020-11-17 NOTE — TELEPHONE ENCOUNTER
----- Message from Cassy  sent at 11/17/2020  4:04 PM CST -----  Regarding: advice  Contact: pt  Type: Needs Medical Advice    Who Called:      [facility] Call Back Number:     Additional Information: Requesting a call back from Nurse, regarding pulse 176 and blood pressure is high and Home health called [facility]  is sending pt to hospital ,please call back with advice

## 2020-11-17 NOTE — TELEPHONE ENCOUNTER
Will call pt tomorrow as the HH company has rolled over to the answering service and she was advised to go to the ER.

## 2020-11-18 ENCOUNTER — TELEPHONE (OUTPATIENT)
Dept: FAMILY MEDICINE | Facility: CLINIC | Age: 80
End: 2020-11-18

## 2020-11-18 NOTE — TELEPHONE ENCOUNTER
NP talked with pt. Pt to find her insurance book of offered medications in her plan then insuline will be sent to the pharmacy.  She was strongly encouraged to follow an ADA diet right now. She admits to being seen in the ER for elevated HR.

## 2020-11-19 ENCOUNTER — PATIENT MESSAGE (OUTPATIENT)
Dept: FAMILY MEDICINE | Facility: CLINIC | Age: 80
End: 2020-11-19

## 2020-11-19 DIAGNOSIS — Z79.4 TYPE 2 DIABETES MELLITUS WITH HYPERGLYCEMIA, WITH LONG-TERM CURRENT USE OF INSULIN: Primary | ICD-10-CM

## 2020-11-19 DIAGNOSIS — E11.65 TYPE 2 DIABETES MELLITUS WITH HYPERGLYCEMIA, WITH LONG-TERM CURRENT USE OF INSULIN: Primary | ICD-10-CM

## 2020-11-20 RX ORDER — INSULIN DEGLUDEC 100 U/ML
10 INJECTION, SOLUTION SUBCUTANEOUS NIGHTLY
Qty: 6 SYRINGE | Refills: 1 | Status: SHIPPED | OUTPATIENT
Start: 2020-11-20 | End: 2021-03-19 | Stop reason: SDUPTHER

## 2020-12-13 ENCOUNTER — PATIENT OUTREACH (OUTPATIENT)
Dept: ADMINISTRATIVE | Facility: OTHER | Age: 80
End: 2020-12-13

## 2020-12-14 ENCOUNTER — OFFICE VISIT (OUTPATIENT)
Dept: PAIN MEDICINE | Facility: CLINIC | Age: 80
End: 2020-12-14
Payer: MEDICARE

## 2020-12-14 VITALS
WEIGHT: 111.75 LBS | HEART RATE: 79 BPM | BODY MASS INDEX: 21.1 KG/M2 | SYSTOLIC BLOOD PRESSURE: 101 MMHG | HEIGHT: 61 IN | DIASTOLIC BLOOD PRESSURE: 65 MMHG

## 2020-12-14 DIAGNOSIS — M51.36 DDD (DEGENERATIVE DISC DISEASE), LUMBAR: Primary | ICD-10-CM

## 2020-12-14 DIAGNOSIS — M48.00 CENTRAL STENOSIS OF SPINAL CANAL: ICD-10-CM

## 2020-12-14 DIAGNOSIS — M47.896 OTHER SPONDYLOSIS, LUMBAR REGION: ICD-10-CM

## 2020-12-14 PROCEDURE — 99999 PR PBB SHADOW E&M-EST. PATIENT-LVL IV: ICD-10-PCS | Mod: PBBFAC,,, | Performed by: ANESTHESIOLOGY

## 2020-12-14 PROCEDURE — 99999 PR PBB SHADOW E&M-EST. PATIENT-LVL IV: CPT | Mod: PBBFAC,,, | Performed by: ANESTHESIOLOGY

## 2020-12-14 PROCEDURE — 99214 PR OFFICE/OUTPT VISIT, EST, LEVL IV, 30-39 MIN: ICD-10-PCS | Mod: S$PBB,,, | Performed by: ANESTHESIOLOGY

## 2020-12-14 PROCEDURE — 99214 OFFICE O/P EST MOD 30 MIN: CPT | Mod: PBBFAC,PN | Performed by: ANESTHESIOLOGY

## 2020-12-14 PROCEDURE — 99214 OFFICE O/P EST MOD 30 MIN: CPT | Mod: S$PBB,,, | Performed by: ANESTHESIOLOGY

## 2020-12-14 NOTE — PROGRESS NOTES
FOLLOW UP NOTE:     CHIEF COMPLAINT: back pain    INITIAL HISTORY OF PRESENT ILLNESS: Maliha Jaramillo is a 79 y.o. female with PMH significant for chronic Xanax usage, DM II, HTN, and atrial fibrillation (not on anticoagulation other than ASA) presents as a referral for the evaluation of left sided back pain. The patient reports that her pain began approximately in September 2020 while doing yardwork and she fell and could not get up. Of note, the patient's son reports that the patient falls frequently, and she has fallen multiple times since her MRI in September 2020. The patient localizes her pain to her left buttock. The patient reports of radiation down the lateral aspect of her LLE to her ankle. The patient describes her pain as a sharp type of pain. The patient reports of numbness in her feet. The patient reports that her pain is a 5/10. Patient denies of any urinary/fecal incontinence or saddle anesthesia. The patient reports of generalized weakness throughout her entire body.      Aggravating factors: constant pain; walking; laying in bed     Mitigating factors: rest     Relevant Surgeries: no    INTERVAL HISTORY OF PRESENT ILLNESS: Maliha Jaramillo is a 79 y.o. female with PMH significant for chronic Xanax usage, DM II, HTN, and atrial fibrillation (not on anticoagulation other than ASA) presents as an established patient for the continued management of back pain. The patient is s/p lumbar interlaminar epidural steroid injection at L4-L5 under fluoroscopic guidance (left paramedian approach), and she reports of good relief. The patient reports that she no longer has pain when she sits or while she sleeps. The patient reports of pain in the AM. She presents today inquiring if she can garden again. The patient is participating in home health PT/OT with some benefit. The patient denies of any significant changes in her health since her last appointment. The patient also denies of any changes in the  "character of her pain since her last appointment. Patient denies of any urinary/fecal incontinence, saddle anesthesia, or weakness.      INTERVENTIONAL PAIN HISTORY:  11/16/2020:  Lumbar interlaminar epidural steroid injection at L4-L5 under fluoroscopic guidance (left paramedian approach) - good relief    CURRENT PAIN MEDICATIONS:  lyrica 25 mg PO BID  horse lineament (some benefit)  mobic 7.5 mg PO q day   Xanax BID      ROS:  Review of Systems   Constitutional: Negative for chills and fever.   HENT: Negative for sore throat.    Eyes: Negative for visual disturbance.   Respiratory: Negative for shortness of breath.    Cardiovascular: Negative for chest pain.   Gastrointestinal: Negative for nausea and vomiting.   Genitourinary: Negative for difficulty urinating.   Musculoskeletal: Positive for arthralgias and back pain.   Skin: Negative for rash.   Allergic/Immunologic: Negative for immunocompromised state.   Neurological: Negative for syncope.   Hematological: Does not bruise/bleed easily.   Psychiatric/Behavioral: Negative for suicidal ideas.        MEDICAL, SURGICAL, FAMILY, SOCIAL HX: reviewed    MEDICATIONS/ALLERGIES: reviewed    PHYSICAL EXAM:    VITALS: Vitals reviewed.   Vitals:    12/14/20 1030   BP: 101/65   Pulse: 79   Weight: 50.7 kg (111 lb 12.4 oz)   Height: 5' 1" (1.549 m)   PainSc: 0-No pain       Physical Exam   Constitutional: She is oriented to person, place, and time and well-developed, well-nourished, and in no distress.   HENT:   Head: Normocephalic and atraumatic.   Eyes: Conjunctivae and EOM are normal. Right eye exhibits no discharge. Left eye exhibits no discharge.   Cardiovascular: Normal rate.   Pulmonary/Chest: Effort normal and breath sounds normal. No respiratory distress.   Abdominal: Soft.   Neurological: She is alert and oriented to person, place, and time.   Skin: Skin is warm and dry. No rash noted. She is not diaphoretic.   Psychiatric: Mood, memory, affect and judgment normal. "   Nursing note and vitals reviewed.       UPPER EXTREMITIES: Normal alignment, normal range of motion, no atrophy, no skin changes,  hair growth and nail growth normal and equal bilaterally. No swelling, no tenderness.    LOWER EXTREMITIES:  Normal alignment, normal range of motion, no atrophy, no skin changes,  hair growth and nail growth normal and equal bilaterally. No swelling, no tenderness.     LUMBAR SPINE  Lumbar spine: ROM is limited with flexion extension and oblique extension with increased pain with passive ROM.    Supine straight leg raise: unable to examine secondary to significant fall risk    ((--)) Facet loading   Myofascial exam: Tenderness to palpation across lumbar paraspinous muscles.      MOTOR: Tone and bulk: normal bilateral upper and lower Strength: normal   Delt      Bi         Tri        WE      WF                        R          5          5          5          5          5          5            L          5          5          5          5          5          5               IP         ADD     ABD     Quad   TA        Gas      HAM  R          5          5          5          5          5          5          5  L          5          5          5          5          5          5          5     SENSATION: Light touch and pinprick intact bilaterally  REFLEXES: normal, symmetric, nonbrisk.  Toes down, no clonus. Negative solis's sign bilaterally.  GAIT: normal rise, base, steps, and arm swing.      IMAGING:   X-ray lumbar spine (11/3/2020):  There are 5 lumbar type vertebra.  There is mild dextroscoliosis.  There is grade 1 anterolisthesis of L4 on L5.  Otherwise, alignment is normal.  There is no fracture.  There is marked disc space narrowing and osteophyte formation at the lumbosacral junction, to lesser extent at L3/4 and L4/5.  There are multilevel bilateral facet degenerative changes, particularly at the lowest 3 levels.  There is focal ectasia of the distal abdominal aorta at  the bifurcation versus artifact from superimposition of ectatic common iliac arteries on the lateral view.  Extensive atherosclerosis noted.    X-ray bilateral hips and pelvis (11/3/2020):  The hips, pubic symphysis and sacroiliac joints are intact.  There is no evidence of a fracture in either hip.  There are mild degenerative changes at the right hip, pubic symphysis, sacroiliac joints and in the lower lumbar spine.  Bones appear mildly demineralized.    ASSESSMENT: Maliha Jaramillo is a 79 y.o. female with PMH significant for chronic Xanax usage, DM II, HTN, and atrial fibrillation (not on anticoagulation other than ASA) presents as an established patient for the continued management of back pain. The patient is s/p lumbar interlaminar epidural steroid injection at L4-L5 under fluoroscopic guidance (left paramedian approach), and she reports of good relief. The patient reports that she is satisfied with her pain relief and wishes to perform more physical activity such as gardening. Treatment plan outlined below.     PLAN:  1. Can repeat L4-L5 lumbar interlaminar epidural steroid injection in the future as needed  2. I have stressed the importance of physical activity and a home exercise plan to help with chronic pain and improve health. Instructed the patient to discuss with home health in regards to her physical limitations  3. Continue mobic 7.5 mg PO q day for inflammation and pain; instructed the patient's son to increase to two tablets of Mobic PRN for severe pain  4. RTC in 2 months for follow-up    Dereck Cervantes MD  Pain Management

## 2021-01-07 ENCOUNTER — PATIENT MESSAGE (OUTPATIENT)
Dept: FAMILY MEDICINE | Facility: CLINIC | Age: 81
End: 2021-01-07

## 2021-01-07 ENCOUNTER — NURSE TRIAGE (OUTPATIENT)
Dept: ADMINISTRATIVE | Facility: CLINIC | Age: 81
End: 2021-01-07

## 2021-01-11 ENCOUNTER — OFFICE VISIT (OUTPATIENT)
Dept: FAMILY MEDICINE | Facility: CLINIC | Age: 81
End: 2021-01-11
Payer: MEDICARE

## 2021-01-11 VITALS
SYSTOLIC BLOOD PRESSURE: 136 MMHG | WEIGHT: 108 LBS | DIASTOLIC BLOOD PRESSURE: 79 MMHG | HEART RATE: 85 BPM | BODY MASS INDEX: 20.39 KG/M2 | RESPIRATION RATE: 15 BRPM | HEIGHT: 61 IN | OXYGEN SATURATION: 97 % | TEMPERATURE: 96 F

## 2021-01-11 DIAGNOSIS — R30.0 BURNING WITH URINATION: ICD-10-CM

## 2021-01-11 DIAGNOSIS — R53.1 WEAKNESS: Primary | ICD-10-CM

## 2021-01-11 DIAGNOSIS — R26.81 UNSTEADY GAIT WHEN WALKING: ICD-10-CM

## 2021-01-11 DIAGNOSIS — R82.90 CLOUDY URINE: ICD-10-CM

## 2021-01-11 PROCEDURE — 99214 OFFICE O/P EST MOD 30 MIN: CPT | Mod: S$GLB,,, | Performed by: NURSE PRACTITIONER

## 2021-01-11 PROCEDURE — 99214 PR OFFICE/OUTPT VISIT, EST, LEVL IV, 30-39 MIN: ICD-10-PCS | Mod: S$GLB,,, | Performed by: NURSE PRACTITIONER

## 2021-01-12 ENCOUNTER — LAB VISIT (OUTPATIENT)
Dept: LAB | Facility: HOSPITAL | Age: 81
End: 2021-01-12
Attending: NURSE PRACTITIONER
Payer: MEDICARE

## 2021-01-12 DIAGNOSIS — R82.90 CLOUDY URINE: ICD-10-CM

## 2021-01-12 DIAGNOSIS — R30.0 BURNING WITH URINATION: ICD-10-CM

## 2021-01-12 LAB
BILIRUB UR QL STRIP: NEGATIVE
CLARITY UR: ABNORMAL
COLOR UR: YELLOW
GLUCOSE UR QL STRIP: ABNORMAL
HGB UR QL STRIP: NEGATIVE
KETONES UR QL STRIP: NEGATIVE
LEUKOCYTE ESTERASE UR QL STRIP: NEGATIVE
MICROSCOPIC COMMENT: NORMAL
NITRITE UR QL STRIP: NEGATIVE
PH UR STRIP: 6 [PH] (ref 5–8)
PROT UR QL STRIP: NEGATIVE
SP GR UR STRIP: 1.02 (ref 1–1.03)
URN SPEC COLLECT METH UR: ABNORMAL
UROBILINOGEN UR STRIP-ACNC: NEGATIVE EU/DL

## 2021-01-12 PROCEDURE — 81000 URINALYSIS NONAUTO W/SCOPE: CPT

## 2021-01-12 PROCEDURE — 87086 URINE CULTURE/COLONY COUNT: CPT

## 2021-01-13 ENCOUNTER — PATIENT OUTREACH (OUTPATIENT)
Dept: ADMINISTRATIVE | Facility: HOSPITAL | Age: 81
End: 2021-01-13

## 2021-01-13 DIAGNOSIS — Z13.1 DIABETES MELLITUS SCREENING: Primary | ICD-10-CM

## 2021-01-14 LAB
BACTERIA UR CULT: NORMAL
BACTERIA UR CULT: NORMAL

## 2021-01-15 ENCOUNTER — TELEPHONE (OUTPATIENT)
Dept: FAMILY MEDICINE | Facility: CLINIC | Age: 81
End: 2021-01-15

## 2021-01-22 ENCOUNTER — PATIENT MESSAGE (OUTPATIENT)
Dept: FAMILY MEDICINE | Facility: CLINIC | Age: 81
End: 2021-01-22

## 2021-01-26 ENCOUNTER — CLINICAL SUPPORT (OUTPATIENT)
Dept: REHABILITATION | Facility: HOSPITAL | Age: 81
End: 2021-01-26
Payer: MEDICARE

## 2021-01-26 DIAGNOSIS — Z74.09 DECREASED MOBILITY AND ENDURANCE: ICD-10-CM

## 2021-01-26 DIAGNOSIS — M62.81 MUSCLE WEAKNESS OF LOWER EXTREMITY: Primary | ICD-10-CM

## 2021-01-26 PROCEDURE — 97110 THERAPEUTIC EXERCISES: CPT | Mod: PN

## 2021-01-26 PROCEDURE — 97162 PT EVAL MOD COMPLEX 30 MIN: CPT | Mod: PN

## 2021-01-28 ENCOUNTER — CLINICAL SUPPORT (OUTPATIENT)
Dept: REHABILITATION | Facility: HOSPITAL | Age: 81
End: 2021-01-28
Payer: MEDICARE

## 2021-01-28 DIAGNOSIS — R53.1 WEAKNESS: Primary | ICD-10-CM

## 2021-01-28 DIAGNOSIS — R26.81 UNSTEADY GAIT WHEN WALKING: ICD-10-CM

## 2021-01-28 PROCEDURE — 97110 THERAPEUTIC EXERCISES: CPT | Mod: PN

## 2021-02-02 ENCOUNTER — CLINICAL SUPPORT (OUTPATIENT)
Dept: REHABILITATION | Facility: HOSPITAL | Age: 81
End: 2021-02-02
Payer: MEDICARE

## 2021-02-02 DIAGNOSIS — R29.898 MUSCULAR DECONDITIONING: Primary | ICD-10-CM

## 2021-02-02 PROCEDURE — 97110 THERAPEUTIC EXERCISES: CPT | Mod: PN

## 2021-02-05 ENCOUNTER — CLINICAL SUPPORT (OUTPATIENT)
Dept: REHABILITATION | Facility: HOSPITAL | Age: 81
End: 2021-02-05
Payer: MEDICARE

## 2021-02-05 DIAGNOSIS — M62.81 MUSCLE WEAKNESS (GENERALIZED): ICD-10-CM

## 2021-02-05 DIAGNOSIS — M51.36 DEGENERATIVE DISC DISEASE, LUMBAR: Primary | ICD-10-CM

## 2021-02-05 PROCEDURE — 97110 THERAPEUTIC EXERCISES: CPT | Mod: PN,CQ

## 2021-02-07 ENCOUNTER — PATIENT OUTREACH (OUTPATIENT)
Dept: ADMINISTRATIVE | Facility: OTHER | Age: 81
End: 2021-02-07

## 2021-02-09 ENCOUNTER — CLINICAL SUPPORT (OUTPATIENT)
Dept: REHABILITATION | Facility: HOSPITAL | Age: 81
End: 2021-02-09
Payer: MEDICARE

## 2021-02-09 DIAGNOSIS — R29.898 MUSCULAR DECONDITIONING: ICD-10-CM

## 2021-02-09 DIAGNOSIS — M62.81 MUSCLE WEAKNESS: Primary | ICD-10-CM

## 2021-02-09 PROCEDURE — 97110 THERAPEUTIC EXERCISES: CPT | Mod: PN

## 2021-02-10 ENCOUNTER — OFFICE VISIT (OUTPATIENT)
Dept: PAIN MEDICINE | Facility: CLINIC | Age: 81
End: 2021-02-10
Payer: MEDICARE

## 2021-02-10 VITALS
TEMPERATURE: 97 F | WEIGHT: 108 LBS | OXYGEN SATURATION: 98 % | SYSTOLIC BLOOD PRESSURE: 140 MMHG | DIASTOLIC BLOOD PRESSURE: 82 MMHG | HEIGHT: 61 IN | BODY MASS INDEX: 20.39 KG/M2 | HEART RATE: 91 BPM

## 2021-02-10 DIAGNOSIS — M25.562 BILATERAL CHRONIC KNEE PAIN: ICD-10-CM

## 2021-02-10 DIAGNOSIS — M51.36 DDD (DEGENERATIVE DISC DISEASE), LUMBAR: Primary | ICD-10-CM

## 2021-02-10 DIAGNOSIS — M25.561 BILATERAL CHRONIC KNEE PAIN: ICD-10-CM

## 2021-02-10 DIAGNOSIS — G89.29 BILATERAL CHRONIC KNEE PAIN: ICD-10-CM

## 2021-02-10 DIAGNOSIS — M48.00 CENTRAL STENOSIS OF SPINAL CANAL: ICD-10-CM

## 2021-02-10 PROCEDURE — 99999 PR PBB SHADOW E&M-EST. PATIENT-LVL IV: ICD-10-PCS | Mod: PBBFAC,,, | Performed by: ANESTHESIOLOGY

## 2021-02-10 PROCEDURE — 99214 OFFICE O/P EST MOD 30 MIN: CPT | Mod: S$PBB,,, | Performed by: ANESTHESIOLOGY

## 2021-02-10 PROCEDURE — 99214 OFFICE O/P EST MOD 30 MIN: CPT | Mod: PBBFAC,PO | Performed by: ANESTHESIOLOGY

## 2021-02-10 PROCEDURE — 99214 PR OFFICE/OUTPT VISIT, EST, LEVL IV, 30-39 MIN: ICD-10-PCS | Mod: S$PBB,,, | Performed by: ANESTHESIOLOGY

## 2021-02-10 PROCEDURE — 99999 PR PBB SHADOW E&M-EST. PATIENT-LVL IV: CPT | Mod: PBBFAC,,, | Performed by: ANESTHESIOLOGY

## 2021-02-19 RX ORDER — SODIUM CHLORIDE, SODIUM LACTATE, POTASSIUM CHLORIDE, CALCIUM CHLORIDE 600; 310; 30; 20 MG/100ML; MG/100ML; MG/100ML; MG/100ML
INJECTION, SOLUTION INTRAVENOUS ONCE AS NEEDED
Status: CANCELLED | OUTPATIENT
Start: 2021-02-19 | End: 2032-07-18

## 2021-02-22 ENCOUNTER — OFFICE VISIT (OUTPATIENT)
Dept: FAMILY MEDICINE | Facility: CLINIC | Age: 81
End: 2021-02-22
Payer: MEDICARE

## 2021-02-22 VITALS
RESPIRATION RATE: 15 BRPM | BODY MASS INDEX: 20.39 KG/M2 | HEIGHT: 61 IN | DIASTOLIC BLOOD PRESSURE: 77 MMHG | OXYGEN SATURATION: 99 % | TEMPERATURE: 99 F | HEART RATE: 96 BPM | SYSTOLIC BLOOD PRESSURE: 139 MMHG | WEIGHT: 108 LBS

## 2021-02-22 DIAGNOSIS — E11.42 TYPE 2 DIABETES MELLITUS WITH DIABETIC POLYNEUROPATHY, WITHOUT LONG-TERM CURRENT USE OF INSULIN: ICD-10-CM

## 2021-02-22 DIAGNOSIS — R53.1 GENERALIZED WEAKNESS: ICD-10-CM

## 2021-02-22 DIAGNOSIS — R29.6 FALLING: ICD-10-CM

## 2021-02-22 DIAGNOSIS — R60.9 DEPENDENT EDEMA: Primary | ICD-10-CM

## 2021-02-22 DIAGNOSIS — M81.0 AGE-RELATED OSTEOPOROSIS WITHOUT CURRENT PATHOLOGICAL FRACTURE: ICD-10-CM

## 2021-02-22 PROCEDURE — 99213 OFFICE O/P EST LOW 20 MIN: CPT | Mod: S$GLB,,, | Performed by: NURSE PRACTITIONER

## 2021-02-22 PROCEDURE — 99213 PR OFFICE/OUTPT VISIT, EST, LEVL III, 20-29 MIN: ICD-10-PCS | Mod: S$GLB,,, | Performed by: NURSE PRACTITIONER

## 2021-02-22 RX ORDER — ALENDRONATE SODIUM 70 MG/1
70 TABLET ORAL
Qty: 4 TABLET | Refills: 11 | Status: SHIPPED | OUTPATIENT
Start: 2021-02-22 | End: 2021-07-12

## 2021-02-25 ENCOUNTER — HOSPITAL ENCOUNTER (OUTPATIENT)
Facility: HOSPITAL | Age: 81
Discharge: HOME | End: 2021-02-25
Attending: ANESTHESIOLOGY | Admitting: ANESTHESIOLOGY
Payer: MEDICARE

## 2021-02-25 DIAGNOSIS — M51.36 DEGENERATIVE DISC DISEASE, LUMBAR: Primary | ICD-10-CM

## 2021-02-25 PROCEDURE — 63600175 PHARM REV CODE 636 W HCPCS: Performed by: ANESTHESIOLOGY

## 2021-02-25 PROCEDURE — 62323 NJX INTERLAMINAR LMBR/SAC: CPT | Performed by: ANESTHESIOLOGY

## 2021-02-25 PROCEDURE — 62323 PR INJ LUMBAR/SACRAL, W/IMAGING GUIDANCE: ICD-10-PCS | Mod: ,,, | Performed by: ANESTHESIOLOGY

## 2021-02-25 PROCEDURE — 62323 NJX INTERLAMINAR LMBR/SAC: CPT | Mod: ,,, | Performed by: ANESTHESIOLOGY

## 2021-02-25 PROCEDURE — 25500020 PHARM REV CODE 255: Performed by: ANESTHESIOLOGY

## 2021-02-25 PROCEDURE — 25000003 PHARM REV CODE 250: Performed by: ANESTHESIOLOGY

## 2021-02-25 RX ORDER — LIDOCAINE HYDROCHLORIDE 10 MG/ML
INJECTION INFILTRATION; PERINEURAL
Status: DISCONTINUED
Start: 2021-02-25 | End: 2021-02-25 | Stop reason: HOSPADM

## 2021-02-25 RX ORDER — ALPRAZOLAM 0.5 MG/1
0.5 TABLET, ORALLY DISINTEGRATING ORAL
Status: DISCONTINUED | OUTPATIENT
Start: 2021-02-25 | End: 2021-02-25 | Stop reason: HOSPADM

## 2021-02-25 RX ORDER — METHYLPREDNISOLONE ACETATE 80 MG/ML
INJECTION, SUSPENSION INTRA-ARTICULAR; INTRALESIONAL; INTRAMUSCULAR; SOFT TISSUE
Status: DISCONTINUED
Start: 2021-02-25 | End: 2021-02-25 | Stop reason: HOSPADM

## 2021-02-25 RX ORDER — ALPRAZOLAM 0.5 MG/1
1 TABLET, ORALLY DISINTEGRATING ORAL ONCE AS NEEDED
Status: COMPLETED | OUTPATIENT
Start: 2021-02-25 | End: 2021-02-25

## 2021-02-25 RX ORDER — METHYLPREDNISOLONE ACETATE 80 MG/ML
INJECTION, SUSPENSION INTRA-ARTICULAR; INTRALESIONAL; INTRAMUSCULAR; SOFT TISSUE
Status: DISCONTINUED | OUTPATIENT
Start: 2021-02-25 | End: 2021-02-25 | Stop reason: HOSPADM

## 2021-02-25 RX ORDER — LIDOCAINE HYDROCHLORIDE 10 MG/ML
INJECTION INFILTRATION; PERINEURAL
Status: DISCONTINUED | OUTPATIENT
Start: 2021-02-25 | End: 2021-02-25 | Stop reason: HOSPADM

## 2021-02-25 RX ADMIN — ALPRAZOLAM 1 MG: 0.5 TABLET, ORALLY DISINTEGRATING ORAL at 11:02

## 2021-02-26 VITALS
BODY MASS INDEX: 19.63 KG/M2 | SYSTOLIC BLOOD PRESSURE: 150 MMHG | HEIGHT: 60 IN | DIASTOLIC BLOOD PRESSURE: 78 MMHG | WEIGHT: 100 LBS | HEART RATE: 89 BPM | TEMPERATURE: 98 F | OXYGEN SATURATION: 97 % | RESPIRATION RATE: 16 BRPM

## 2021-03-13 ENCOUNTER — TELEPHONE (OUTPATIENT)
Dept: URGENT CARE | Facility: CLINIC | Age: 81
End: 2021-03-13

## 2021-03-13 ENCOUNTER — PATIENT MESSAGE (OUTPATIENT)
Dept: FAMILY MEDICINE | Facility: CLINIC | Age: 81
End: 2021-03-13

## 2021-03-13 DIAGNOSIS — S96.919S: Primary | ICD-10-CM

## 2021-03-18 ENCOUNTER — PATIENT OUTREACH (OUTPATIENT)
Dept: ADMINISTRATIVE | Facility: OTHER | Age: 81
End: 2021-03-18

## 2021-03-19 ENCOUNTER — PATIENT MESSAGE (OUTPATIENT)
Dept: FAMILY MEDICINE | Facility: CLINIC | Age: 81
End: 2021-03-19

## 2021-03-19 DIAGNOSIS — E11.65 TYPE 2 DIABETES MELLITUS WITH HYPERGLYCEMIA, WITH LONG-TERM CURRENT USE OF INSULIN: ICD-10-CM

## 2021-03-19 DIAGNOSIS — Z79.4 TYPE 2 DIABETES MELLITUS WITH HYPERGLYCEMIA, WITH LONG-TERM CURRENT USE OF INSULIN: ICD-10-CM

## 2021-03-19 RX ORDER — INSULIN DEGLUDEC 100 U/ML
20 INJECTION, SOLUTION SUBCUTANEOUS NIGHTLY
Qty: 6 SYRINGE | Refills: 3 | Status: SHIPPED | OUTPATIENT
Start: 2021-03-19 | End: 2021-04-12 | Stop reason: SDUPTHER

## 2021-03-23 ENCOUNTER — OFFICE VISIT (OUTPATIENT)
Dept: ORTHOPEDICS | Facility: CLINIC | Age: 81
End: 2021-03-23
Payer: MEDICARE

## 2021-03-23 ENCOUNTER — HOSPITAL ENCOUNTER (OUTPATIENT)
Dept: RADIOLOGY | Facility: HOSPITAL | Age: 81
Discharge: HOME | End: 2021-03-23
Attending: ORTHOPAEDIC SURGERY
Payer: MEDICARE

## 2021-03-23 VITALS
OXYGEN SATURATION: 98 % | RESPIRATION RATE: 20 BRPM | HEART RATE: 91 BPM | TEMPERATURE: 98 F | BODY MASS INDEX: 19.63 KG/M2 | WEIGHT: 100 LBS | HEIGHT: 60 IN

## 2021-03-23 DIAGNOSIS — M25.572 ACUTE LEFT ANKLE PAIN: Primary | ICD-10-CM

## 2021-03-23 DIAGNOSIS — M19.072 ARTHRITIS OF LEFT ANKLE: ICD-10-CM

## 2021-03-23 DIAGNOSIS — S93.492A SPRAIN OF ANTERIOR TALOFIBULAR LIGAMENT OF LEFT ANKLE, INITIAL ENCOUNTER: Primary | ICD-10-CM

## 2021-03-23 DIAGNOSIS — M25.572 ACUTE LEFT ANKLE PAIN: ICD-10-CM

## 2021-03-23 DIAGNOSIS — S96.919S: ICD-10-CM

## 2021-03-23 PROCEDURE — 99999 PR PBB SHADOW E&M-EST. PATIENT-LVL IV: ICD-10-PCS | Mod: PBBFAC,,, | Performed by: ORTHOPAEDIC SURGERY

## 2021-03-23 PROCEDURE — 73610 X-RAY EXAM OF ANKLE: CPT | Mod: 26,LT,, | Performed by: RADIOLOGY

## 2021-03-23 PROCEDURE — 99203 OFFICE O/P NEW LOW 30 MIN: CPT | Mod: S$PBB,,, | Performed by: ORTHOPAEDIC SURGERY

## 2021-03-23 PROCEDURE — 99999 PR PBB SHADOW E&M-EST. PATIENT-LVL IV: CPT | Mod: PBBFAC,,, | Performed by: ORTHOPAEDIC SURGERY

## 2021-03-23 PROCEDURE — 73610 XR ANKLE COMPLETE 3 VIEW LEFT: ICD-10-PCS | Mod: 26,LT,, | Performed by: RADIOLOGY

## 2021-03-23 PROCEDURE — 99203 PR OFFICE/OUTPT VISIT, NEW, LEVL III, 30-44 MIN: ICD-10-PCS | Mod: S$PBB,,, | Performed by: ORTHOPAEDIC SURGERY

## 2021-03-23 PROCEDURE — 73610 X-RAY EXAM OF ANKLE: CPT | Mod: TC,PN,LT

## 2021-03-23 PROCEDURE — 99214 OFFICE O/P EST MOD 30 MIN: CPT | Mod: PBBFAC,25,PN | Performed by: ORTHOPAEDIC SURGERY

## 2021-03-31 ENCOUNTER — OFFICE VISIT (OUTPATIENT)
Dept: PAIN MEDICINE | Facility: CLINIC | Age: 81
End: 2021-03-31
Payer: MEDICARE

## 2021-03-31 VITALS
BODY MASS INDEX: 19.63 KG/M2 | WEIGHT: 100 LBS | OXYGEN SATURATION: 98 % | HEIGHT: 60 IN | SYSTOLIC BLOOD PRESSURE: 142 MMHG | HEART RATE: 93 BPM | TEMPERATURE: 98 F | DIASTOLIC BLOOD PRESSURE: 83 MMHG

## 2021-03-31 DIAGNOSIS — M25.552 CHRONIC HIP PAIN, BILATERAL: ICD-10-CM

## 2021-03-31 DIAGNOSIS — M25.551 CHRONIC HIP PAIN, BILATERAL: ICD-10-CM

## 2021-03-31 DIAGNOSIS — G89.29 CHRONIC HIP PAIN, BILATERAL: ICD-10-CM

## 2021-03-31 DIAGNOSIS — M47.896 OTHER SPONDYLOSIS, LUMBAR REGION: ICD-10-CM

## 2021-03-31 DIAGNOSIS — M48.00 CENTRAL STENOSIS OF SPINAL CANAL: Primary | ICD-10-CM

## 2021-03-31 DIAGNOSIS — G89.29 BILATERAL CHRONIC KNEE PAIN: ICD-10-CM

## 2021-03-31 DIAGNOSIS — M25.562 BILATERAL CHRONIC KNEE PAIN: ICD-10-CM

## 2021-03-31 DIAGNOSIS — M51.36 DDD (DEGENERATIVE DISC DISEASE), LUMBAR: ICD-10-CM

## 2021-03-31 DIAGNOSIS — M25.561 BILATERAL CHRONIC KNEE PAIN: ICD-10-CM

## 2021-03-31 PROCEDURE — 99999 PR PBB SHADOW E&M-EST. PATIENT-LVL IV: ICD-10-PCS | Mod: PBBFAC,,, | Performed by: NURSE PRACTITIONER

## 2021-03-31 PROCEDURE — 99214 OFFICE O/P EST MOD 30 MIN: CPT | Mod: S$PBB,,, | Performed by: NURSE PRACTITIONER

## 2021-03-31 PROCEDURE — 99214 PR OFFICE/OUTPT VISIT, EST, LEVL IV, 30-39 MIN: ICD-10-PCS | Mod: S$PBB,,, | Performed by: NURSE PRACTITIONER

## 2021-03-31 PROCEDURE — 99214 OFFICE O/P EST MOD 30 MIN: CPT | Mod: PBBFAC,PO | Performed by: NURSE PRACTITIONER

## 2021-03-31 PROCEDURE — 99999 PR PBB SHADOW E&M-EST. PATIENT-LVL IV: CPT | Mod: PBBFAC,,, | Performed by: NURSE PRACTITIONER

## 2021-04-05 DIAGNOSIS — M25.561 ACUTE PAIN OF BOTH KNEES: Primary | ICD-10-CM

## 2021-04-05 DIAGNOSIS — M25.562 ACUTE PAIN OF BOTH KNEES: Primary | ICD-10-CM

## 2021-04-12 ENCOUNTER — LAB VISIT (OUTPATIENT)
Dept: LAB | Facility: HOSPITAL | Age: 81
End: 2021-04-12
Attending: NURSE PRACTITIONER
Payer: MEDICARE

## 2021-04-12 ENCOUNTER — OFFICE VISIT (OUTPATIENT)
Dept: FAMILY MEDICINE | Facility: CLINIC | Age: 81
End: 2021-04-12
Payer: MEDICARE

## 2021-04-12 VITALS
HEIGHT: 60 IN | OXYGEN SATURATION: 96 % | SYSTOLIC BLOOD PRESSURE: 135 MMHG | WEIGHT: 100 LBS | DIASTOLIC BLOOD PRESSURE: 82 MMHG | TEMPERATURE: 98 F | RESPIRATION RATE: 16 BRPM | BODY MASS INDEX: 19.63 KG/M2 | HEART RATE: 68 BPM

## 2021-04-12 DIAGNOSIS — Z79.4 TYPE 2 DIABETES MELLITUS WITH HYPERGLYCEMIA, WITH LONG-TERM CURRENT USE OF INSULIN: ICD-10-CM

## 2021-04-12 DIAGNOSIS — I10 ESSENTIAL HYPERTENSION: ICD-10-CM

## 2021-04-12 DIAGNOSIS — E55.9 VITAMIN D DEFICIENCY: ICD-10-CM

## 2021-04-12 DIAGNOSIS — R82.90 CLOUDY URINE: ICD-10-CM

## 2021-04-12 DIAGNOSIS — R53.1 WEAKNESS GENERALIZED: ICD-10-CM

## 2021-04-12 DIAGNOSIS — Z74.1 ASSISTANCE NEEDED FOR PERSONAL HYGIENE: ICD-10-CM

## 2021-04-12 DIAGNOSIS — E11.65 TYPE 2 DIABETES MELLITUS WITH HYPERGLYCEMIA, WITH LONG-TERM CURRENT USE OF INSULIN: ICD-10-CM

## 2021-04-12 DIAGNOSIS — F33.1 MODERATE EPISODE OF RECURRENT MAJOR DEPRESSIVE DISORDER: Primary | ICD-10-CM

## 2021-04-12 DIAGNOSIS — Z79.899 BENZODIAZEPINE USE AGREEMENT EXISTS: ICD-10-CM

## 2021-04-12 DIAGNOSIS — E11.42 TYPE 2 DIABETES MELLITUS WITH DIABETIC POLYNEUROPATHY, WITHOUT LONG-TERM CURRENT USE OF INSULIN: ICD-10-CM

## 2021-04-12 DIAGNOSIS — E03.9 ACQUIRED HYPOTHYROIDISM: ICD-10-CM

## 2021-04-12 DIAGNOSIS — F41.9 ANXIETY: ICD-10-CM

## 2021-04-12 LAB
ALBUMIN SERPL BCP-MCNC: 3.7 G/DL (ref 3.5–5.2)
ALP SERPL-CCNC: 113 U/L (ref 55–135)
ALT SERPL W/O P-5'-P-CCNC: 32 U/L (ref 10–44)
ANION GAP SERPL CALC-SCNC: 13 MMOL/L (ref 8–16)
AST SERPL-CCNC: 28 U/L (ref 10–40)
BASOPHILS # BLD AUTO: 0.09 K/UL (ref 0–0.2)
BASOPHILS NFR BLD: 0.7 % (ref 0–1.9)
BILIRUB SERPL-MCNC: 0.3 MG/DL (ref 0.1–1)
BILIRUB SERPL-MCNC: ABNORMAL MG/DL
BLOOD URINE, POC: ABNORMAL
BUN SERPL-MCNC: 23 MG/DL (ref 8–23)
CALCIUM SERPL-MCNC: 9.7 MG/DL (ref 8.7–10.5)
CHLORIDE SERPL-SCNC: 103 MMOL/L (ref 95–110)
CLARITY, POC UA: CLEAR
CO2 SERPL-SCNC: 27 MMOL/L (ref 23–29)
COLOR, POC UA: ABNORMAL
CREAT SERPL-MCNC: 1.3 MG/DL (ref 0.5–1.4)
DIFFERENTIAL METHOD: ABNORMAL
EOSINOPHIL # BLD AUTO: 0.3 K/UL (ref 0–0.5)
EOSINOPHIL NFR BLD: 2.4 % (ref 0–8)
ERYTHROCYTE [DISTWIDTH] IN BLOOD BY AUTOMATED COUNT: 13 % (ref 11.5–14.5)
EST. GFR  (AFRICAN AMERICAN): 44.1 ML/MIN/1.73 M^2
EST. GFR  (NON AFRICAN AMERICAN): 38.3 ML/MIN/1.73 M^2
ESTIMATED AVG GLUCOSE: 203 MG/DL (ref 68–131)
GLUCOSE SERPL-MCNC: 206 MG/DL (ref 70–110)
GLUCOSE UR QL STRIP: ABNORMAL
HBA1C MFR BLD: 8.7 % (ref 4.5–6.2)
HCT VFR BLD AUTO: 37.7 % (ref 37–48.5)
HGB BLD-MCNC: 12.2 G/DL (ref 12–16)
IMM GRANULOCYTES # BLD AUTO: 0.06 K/UL (ref 0–0.04)
IMM GRANULOCYTES NFR BLD AUTO: 0.5 % (ref 0–0.5)
KETONES UR QL STRIP: ABNORMAL
LEUKOCYTE ESTERASE URINE, POC: ABNORMAL
LYMPHOCYTES # BLD AUTO: 3.1 K/UL (ref 1–4.8)
LYMPHOCYTES NFR BLD: 25.1 % (ref 18–48)
MCH RBC QN AUTO: 32 PG (ref 27–31)
MCHC RBC AUTO-ENTMCNC: 32.4 G/DL (ref 32–36)
MCV RBC AUTO: 99 FL (ref 82–98)
MONOCYTES # BLD AUTO: 0.7 K/UL (ref 0.3–1)
MONOCYTES NFR BLD: 6 % (ref 4–15)
NEUTROPHILS # BLD AUTO: 8 K/UL (ref 1.8–7.7)
NEUTROPHILS NFR BLD: 65.3 % (ref 38–73)
NITRITE, POC UA: ABNORMAL
NRBC BLD-RTO: 0 /100 WBC
PH, POC UA: 5
PLATELET # BLD AUTO: 332 K/UL (ref 150–450)
PMV BLD AUTO: 9.9 FL (ref 9.2–12.9)
POTASSIUM SERPL-SCNC: 3.9 MMOL/L (ref 3.5–5.1)
PROT SERPL-MCNC: 7.7 G/DL (ref 6–8.4)
PROTEIN, POC: ABNORMAL
RBC # BLD AUTO: 3.81 M/UL (ref 4–5.4)
SODIUM SERPL-SCNC: 143 MMOL/L (ref 136–145)
SPECIFIC GRAVITY, POC UA: 0.13
T4 FREE SERPL-MCNC: 1.03 NG/DL (ref 0.71–1.51)
TSH SERPL DL<=0.005 MIU/L-ACNC: 1.12 UIU/ML (ref 0.34–5.6)
UROBILINOGEN, POC UA: NORMAL
WBC # BLD AUTO: 12.25 K/UL (ref 3.9–12.7)

## 2021-04-12 PROCEDURE — 87088 URINE BACTERIA CULTURE: CPT | Performed by: NURSE PRACTITIONER

## 2021-04-12 PROCEDURE — 87077 CULTURE AEROBIC IDENTIFY: CPT | Performed by: NURSE PRACTITIONER

## 2021-04-12 PROCEDURE — 80053 COMPREHEN METABOLIC PANEL: CPT | Performed by: NURSE PRACTITIONER

## 2021-04-12 PROCEDURE — 82570 ASSAY OF URINE CREATININE: CPT | Performed by: NURSE PRACTITIONER

## 2021-04-12 PROCEDURE — 99214 PR OFFICE/OUTPT VISIT, EST, LEVL IV, 30-39 MIN: ICD-10-PCS | Mod: 25,S$GLB,, | Performed by: NURSE PRACTITIONER

## 2021-04-12 PROCEDURE — 87186 SC STD MICRODIL/AGAR DIL: CPT | Performed by: NURSE PRACTITIONER

## 2021-04-12 PROCEDURE — 80307 DRUG TEST PRSMV CHEM ANLYZR: CPT | Performed by: NURSE PRACTITIONER

## 2021-04-12 PROCEDURE — 84443 ASSAY THYROID STIM HORMONE: CPT | Performed by: NURSE PRACTITIONER

## 2021-04-12 PROCEDURE — 82306 VITAMIN D 25 HYDROXY: CPT | Performed by: NURSE PRACTITIONER

## 2021-04-12 PROCEDURE — 84439 ASSAY OF FREE THYROXINE: CPT | Performed by: NURSE PRACTITIONER

## 2021-04-12 PROCEDURE — 83036 HEMOGLOBIN GLYCOSYLATED A1C: CPT | Performed by: NURSE PRACTITIONER

## 2021-04-12 PROCEDURE — 36415 COLL VENOUS BLD VENIPUNCTURE: CPT | Performed by: NURSE PRACTITIONER

## 2021-04-12 PROCEDURE — 85025 COMPLETE CBC W/AUTO DIFF WBC: CPT | Performed by: NURSE PRACTITIONER

## 2021-04-12 PROCEDURE — 81002 URINALYSIS NONAUTO W/O SCOPE: CPT | Mod: S$GLB,,, | Performed by: NURSE PRACTITIONER

## 2021-04-12 PROCEDURE — 99214 OFFICE O/P EST MOD 30 MIN: CPT | Mod: 25,S$GLB,, | Performed by: NURSE PRACTITIONER

## 2021-04-12 PROCEDURE — 87086 URINE CULTURE/COLONY COUNT: CPT | Performed by: NURSE PRACTITIONER

## 2021-04-12 PROCEDURE — 81002 POCT URINE DIPSTICK WITHOUT MICROSCOPE: ICD-10-PCS | Mod: S$GLB,,, | Performed by: NURSE PRACTITIONER

## 2021-04-12 PROCEDURE — 82043 UR ALBUMIN QUANTITATIVE: CPT | Performed by: NURSE PRACTITIONER

## 2021-04-12 RX ORDER — DULOXETIN HYDROCHLORIDE 30 MG/1
30 CAPSULE, DELAYED RELEASE ORAL DAILY
Qty: 90 CAPSULE | Refills: 1 | Status: SHIPPED | OUTPATIENT
Start: 2021-04-12 | End: 2021-08-25

## 2021-04-12 RX ORDER — ALPRAZOLAM 0.5 MG/1
0.5 TABLET ORAL 2 TIMES DAILY
Qty: 60 TABLET | Refills: 2 | Status: SHIPPED | OUTPATIENT
Start: 2021-04-12 | End: 2021-07-12 | Stop reason: SDUPTHER

## 2021-04-12 RX ORDER — INSULIN DEGLUDEC 100 U/ML
20 INJECTION, SOLUTION SUBCUTANEOUS NIGHTLY
Qty: 6 SYRINGE | Refills: 3 | Status: SHIPPED | OUTPATIENT
Start: 2021-04-12 | End: 2021-11-15

## 2021-04-13 LAB
25(OH)D3+25(OH)D2 SERPL-MCNC: 145 NG/ML (ref 30–96)
ALBUMIN/CREAT UR: 84.4 UG/MG (ref 0–30)
CREAT UR-MCNC: 179 MG/DL (ref 15–325)
MICROALBUMIN UR DL<=1MG/L-MCNC: 151 UG/ML

## 2021-04-15 LAB — BACTERIA UR CULT: ABNORMAL

## 2021-04-17 LAB
6MAM UR QL: NOT DETECTED
7AMINOCLONAZEPAM UR QL: NOT DETECTED
A-OH ALPRAZ UR QL: PRESENT
ALPHA-OH-MIDAZOLAM: NOT DETECTED
ALPRAZ UR QL: PRESENT
AMPHET UR QL SCN: NOT DETECTED
ANNOTATION COMMENT IMP: NORMAL
ANNOTATION COMMENT IMP: NORMAL
BARBITURATES UR QL: NOT DETECTED
BUPRENORPHINE UR QL: NOT DETECTED
BZE UR QL: NOT DETECTED
CARBOXYTHC UR QL: NOT DETECTED
CARISOPRODOL UR QL: NOT DETECTED
CLONAZEPAM UR QL: NOT DETECTED
CODEINE UR QL: NOT DETECTED
CREAT UR-MCNC: 184.8 MG/DL (ref 20–400)
DIAZEPAM UR QL: NOT DETECTED
ETHYL GLUCURONIDE UR QL: NOT DETECTED
FENTANYL UR QL: NOT DETECTED
GABAPENTIN: NOT DETECTED
HYDROCODONE UR QL: NOT DETECTED
HYDROMORPHONE UR QL: NOT DETECTED
LORAZEPAM UR QL: NOT DETECTED
MDA UR QL: NOT DETECTED
MDEA UR QL: NOT DETECTED
MDMA UR QL: NOT DETECTED
ME-PHENIDATE UR QL: NOT DETECTED
MEPERIDINE UR QL: NOT DETECTED
METHADONE UR QL: NOT DETECTED
METHAMPHET UR QL: NOT DETECTED
MIDAZOLAM UR QL SCN: NOT DETECTED
MORPHINE UR QL: NOT DETECTED
NALOXONE: NOT DETECTED
NORBUPRENORPHINE UR QL CFM: NOT DETECTED
NORDIAZEPAM UR QL: NOT DETECTED
NORFENTANYL UR QL: NOT DETECTED
NORHYDROCODONE UR QL CFM: NOT DETECTED
NOROXYCODONE UR QL CFM: NOT DETECTED
NOROXYMORPHONE UR QL SCN: NOT DETECTED
OXAZEPAM UR QL: NOT DETECTED
OXYCODONE UR QL: NOT DETECTED
OXYMORPHONE UR QL: NOT DETECTED
PATHOLOGY STUDY: NORMAL
PCP UR QL: NOT DETECTED
PHENTERMINE UR QL: NOT DETECTED
PREGABALIN: PRESENT
SERVICE CMNT-IMP: NORMAL
TAPENTADOL UR QL SCN: NOT DETECTED
TAPENTADOL-O-SULF: NOT DETECTED
TEMAZEPAM UR QL: NOT DETECTED
TRAMADOL UR QL: NOT DETECTED
ZOLPIDEM METABOLITE: NOT DETECTED
ZOLPIDEM UR QL: NOT DETECTED

## 2021-04-27 ENCOUNTER — TELEPHONE (OUTPATIENT)
Dept: FAMILY MEDICINE | Facility: CLINIC | Age: 81
End: 2021-04-27

## 2021-04-28 ENCOUNTER — OFFICE VISIT (OUTPATIENT)
Dept: FAMILY MEDICINE | Facility: CLINIC | Age: 81
End: 2021-04-28
Payer: MEDICARE

## 2021-04-28 VITALS
SYSTOLIC BLOOD PRESSURE: 132 MMHG | HEART RATE: 102 BPM | DIASTOLIC BLOOD PRESSURE: 82 MMHG | OXYGEN SATURATION: 95 % | RESPIRATION RATE: 16 BRPM | WEIGHT: 91 LBS | HEIGHT: 60 IN | TEMPERATURE: 97 F | BODY MASS INDEX: 17.87 KG/M2

## 2021-04-28 DIAGNOSIS — S01.21XS LACERATION OF NOSE, SEQUELA: ICD-10-CM

## 2021-04-28 DIAGNOSIS — R53.1 WEAKNESS GENERALIZED: Primary | ICD-10-CM

## 2021-04-28 DIAGNOSIS — S00.10XS: ICD-10-CM

## 2021-04-28 DIAGNOSIS — R29.6 FALLING EPISODES: ICD-10-CM

## 2021-04-28 PROCEDURE — 99213 PR OFFICE/OUTPT VISIT, EST, LEVL III, 20-29 MIN: ICD-10-PCS | Mod: S$GLB,,, | Performed by: NURSE PRACTITIONER

## 2021-04-28 PROCEDURE — 99213 OFFICE O/P EST LOW 20 MIN: CPT | Mod: S$GLB,,, | Performed by: NURSE PRACTITIONER

## 2021-05-09 ENCOUNTER — TELEPHONE (OUTPATIENT)
Dept: URGENT CARE | Facility: CLINIC | Age: 81
End: 2021-05-09

## 2021-05-09 DIAGNOSIS — L89.151 PRESSURE INJURY OF SACRAL REGION, STAGE 1: Primary | ICD-10-CM

## 2021-05-09 DIAGNOSIS — L98.9 SORE ON ANKLE: ICD-10-CM

## 2021-05-11 ENCOUNTER — PATIENT MESSAGE (OUTPATIENT)
Dept: FAMILY MEDICINE | Facility: CLINIC | Age: 81
End: 2021-05-11

## 2021-05-30 ENCOUNTER — PATIENT MESSAGE (OUTPATIENT)
Dept: FAMILY MEDICINE | Facility: CLINIC | Age: 81
End: 2021-05-30

## 2021-07-12 ENCOUNTER — OFFICE VISIT (OUTPATIENT)
Dept: FAMILY MEDICINE | Facility: CLINIC | Age: 81
End: 2021-07-12
Payer: MEDICARE

## 2021-07-12 ENCOUNTER — LAB VISIT (OUTPATIENT)
Dept: LAB | Facility: HOSPITAL | Age: 81
End: 2021-07-12
Attending: NURSE PRACTITIONER
Payer: MEDICARE

## 2021-07-12 VITALS
BODY MASS INDEX: 17.87 KG/M2 | DIASTOLIC BLOOD PRESSURE: 80 MMHG | SYSTOLIC BLOOD PRESSURE: 122 MMHG | HEIGHT: 60 IN | WEIGHT: 91 LBS | RESPIRATION RATE: 16 BRPM | HEART RATE: 101 BPM | OXYGEN SATURATION: 95 % | TEMPERATURE: 98 F

## 2021-07-12 DIAGNOSIS — Z09 HOSPITAL DISCHARGE FOLLOW-UP: Primary | ICD-10-CM

## 2021-07-12 DIAGNOSIS — F41.9 ANXIETY: ICD-10-CM

## 2021-07-12 DIAGNOSIS — I47.10 PAROXYSMAL SVT (SUPRAVENTRICULAR TACHYCARDIA): ICD-10-CM

## 2021-07-12 DIAGNOSIS — E78.2 MIXED HYPERLIPIDEMIA: ICD-10-CM

## 2021-07-12 DIAGNOSIS — E03.9 ACQUIRED HYPOTHYROIDISM: ICD-10-CM

## 2021-07-12 DIAGNOSIS — I10 ESSENTIAL HYPERTENSION: ICD-10-CM

## 2021-07-12 DIAGNOSIS — L89.622 DECUBITUS ULCER OF LEFT HEEL, STAGE 2: ICD-10-CM

## 2021-07-12 LAB
ALBUMIN SERPL BCP-MCNC: 3.2 G/DL (ref 3.5–5.2)
ALP SERPL-CCNC: 145 U/L (ref 55–135)
ALT SERPL W/O P-5'-P-CCNC: 26 U/L (ref 10–44)
ANION GAP SERPL CALC-SCNC: 10 MMOL/L (ref 8–16)
AST SERPL-CCNC: 50 U/L (ref 10–40)
BASOPHILS # BLD AUTO: 0.11 K/UL (ref 0–0.2)
BASOPHILS NFR BLD: 1.1 % (ref 0–1.9)
BILIRUB SERPL-MCNC: 0.3 MG/DL (ref 0.1–1)
BUN SERPL-MCNC: 13 MG/DL (ref 8–23)
CALCIUM SERPL-MCNC: 9.5 MG/DL (ref 8.7–10.5)
CHLORIDE SERPL-SCNC: 102 MMOL/L (ref 95–110)
CO2 SERPL-SCNC: 26 MMOL/L (ref 23–29)
CREAT SERPL-MCNC: 1.4 MG/DL (ref 0.5–1.4)
DIFFERENTIAL METHOD: ABNORMAL
EOSINOPHIL # BLD AUTO: 0.4 K/UL (ref 0–0.5)
EOSINOPHIL NFR BLD: 4.3 % (ref 0–8)
ERYTHROCYTE [DISTWIDTH] IN BLOOD BY AUTOMATED COUNT: 13 % (ref 11.5–14.5)
EST. GFR  (AFRICAN AMERICAN): 40.4 ML/MIN/1.73 M^2
EST. GFR  (NON AFRICAN AMERICAN): 35 ML/MIN/1.73 M^2
ESTIMATED AVG GLUCOSE: 157 MG/DL (ref 68–131)
GLUCOSE SERPL-MCNC: 175 MG/DL (ref 70–110)
HBA1C MFR BLD: 7.1 % (ref 4–5.6)
HCT VFR BLD AUTO: 35.6 % (ref 37–48.5)
HGB BLD-MCNC: 11.7 G/DL (ref 12–16)
IMM GRANULOCYTES # BLD AUTO: 0.03 K/UL (ref 0–0.04)
IMM GRANULOCYTES NFR BLD AUTO: 0.3 % (ref 0–0.5)
LYMPHOCYTES # BLD AUTO: 3.6 K/UL (ref 1–4.8)
LYMPHOCYTES NFR BLD: 35.7 % (ref 18–48)
MCH RBC QN AUTO: 31.5 PG (ref 27–31)
MCHC RBC AUTO-ENTMCNC: 32.9 G/DL (ref 32–36)
MCV RBC AUTO: 96 FL (ref 82–98)
MONOCYTES # BLD AUTO: 0.7 K/UL (ref 0.3–1)
MONOCYTES NFR BLD: 6.6 % (ref 4–15)
NEUTROPHILS # BLD AUTO: 5.2 K/UL (ref 1.8–7.7)
NEUTROPHILS NFR BLD: 52 % (ref 38–73)
NRBC BLD-RTO: 0 /100 WBC
PLATELET # BLD AUTO: 307 K/UL (ref 150–450)
PMV BLD AUTO: 9.8 FL (ref 9.2–12.9)
POTASSIUM SERPL-SCNC: 4.2 MMOL/L (ref 3.5–5.1)
PROT SERPL-MCNC: 7.6 G/DL (ref 6–8.4)
RBC # BLD AUTO: 3.72 M/UL (ref 4–5.4)
SODIUM SERPL-SCNC: 138 MMOL/L (ref 136–145)
T4 FREE SERPL-MCNC: 0.41 NG/DL (ref 0.71–1.51)
TSH SERPL DL<=0.005 MIU/L-ACNC: 104.7 UIU/ML (ref 0.4–4)
WBC # BLD AUTO: 10.08 K/UL (ref 3.9–12.7)

## 2021-07-12 PROCEDURE — 85025 COMPLETE CBC W/AUTO DIFF WBC: CPT | Performed by: NURSE PRACTITIONER

## 2021-07-12 PROCEDURE — 99214 OFFICE O/P EST MOD 30 MIN: CPT | Mod: S$GLB,,, | Performed by: NURSE PRACTITIONER

## 2021-07-12 PROCEDURE — 84443 ASSAY THYROID STIM HORMONE: CPT | Performed by: NURSE PRACTITIONER

## 2021-07-12 PROCEDURE — 36415 COLL VENOUS BLD VENIPUNCTURE: CPT | Performed by: NURSE PRACTITIONER

## 2021-07-12 PROCEDURE — 99214 PR OFFICE/OUTPT VISIT, EST, LEVL IV, 30-39 MIN: ICD-10-PCS | Mod: S$GLB,,, | Performed by: NURSE PRACTITIONER

## 2021-07-12 PROCEDURE — 84439 ASSAY OF FREE THYROXINE: CPT | Performed by: NURSE PRACTITIONER

## 2021-07-12 PROCEDURE — 80053 COMPREHEN METABOLIC PANEL: CPT | Performed by: NURSE PRACTITIONER

## 2021-07-12 PROCEDURE — 83036 HEMOGLOBIN GLYCOSYLATED A1C: CPT | Performed by: NURSE PRACTITIONER

## 2021-07-12 RX ORDER — FUROSEMIDE 40 MG/1
40 TABLET ORAL DAILY
COMMUNITY
Start: 2021-06-25 | End: 2021-08-31 | Stop reason: SDUPTHER

## 2021-07-12 RX ORDER — POTASSIUM CHLORIDE 750 MG/1
10 CAPSULE, EXTENDED RELEASE ORAL 2 TIMES DAILY
COMMUNITY
Start: 2021-06-25 | End: 2021-09-28 | Stop reason: SDUPTHER

## 2021-07-12 RX ORDER — CARVEDILOL 3.12 MG/1
3.12 TABLET ORAL 2 TIMES DAILY WITH MEALS
Qty: 180 TABLET | Refills: 1 | Status: SHIPPED | OUTPATIENT
Start: 2021-07-12 | End: 2021-11-15

## 2021-07-12 RX ORDER — DULAGLUTIDE 3 MG/.5ML
3 INJECTION, SOLUTION SUBCUTANEOUS
Qty: 4 PEN | Refills: 11 | Status: SHIPPED | OUTPATIENT
Start: 2021-07-12 | End: 2022-01-01

## 2021-07-12 RX ORDER — ALPRAZOLAM 0.5 MG/1
0.5 TABLET ORAL 2 TIMES DAILY
Qty: 60 TABLET | Refills: 2 | Status: SHIPPED | OUTPATIENT
Start: 2021-07-12 | End: 2021-11-15

## 2021-07-12 RX ORDER — ATORVASTATIN CALCIUM 40 MG/1
40 TABLET, FILM COATED ORAL DAILY
Qty: 90 TABLET | Refills: 3 | Status: SHIPPED | OUTPATIENT
Start: 2021-07-12 | End: 2022-02-09

## 2021-07-12 RX ORDER — INSULIN DETEMIR 100 [IU]/ML
20 INJECTION, SOLUTION SUBCUTANEOUS DAILY
COMMUNITY
Start: 2021-05-25 | End: 2021-07-12

## 2021-07-12 RX ORDER — ERGOCALCIFEROL 1.25 MG/1
50000 CAPSULE ORAL
COMMUNITY
Start: 2021-05-25 | End: 2021-11-03

## 2021-07-14 ENCOUNTER — TELEPHONE (OUTPATIENT)
Dept: FAMILY MEDICINE | Facility: CLINIC | Age: 81
End: 2021-07-14

## 2021-07-14 DIAGNOSIS — R39.9 UTI SYMPTOMS: Primary | ICD-10-CM

## 2021-07-14 RX ORDER — SULFAMETHOXAZOLE AND TRIMETHOPRIM 800; 160 MG/1; MG/1
1 TABLET ORAL 2 TIMES DAILY
Qty: 14 TABLET | Refills: 0 | Status: SHIPPED | OUTPATIENT
Start: 2021-07-14 | End: 2021-08-25

## 2021-08-01 ENCOUNTER — PATIENT MESSAGE (OUTPATIENT)
Dept: FAMILY MEDICINE | Facility: CLINIC | Age: 81
End: 2021-08-01

## 2021-08-02 ENCOUNTER — PATIENT MESSAGE (OUTPATIENT)
Dept: FAMILY MEDICINE | Facility: CLINIC | Age: 81
End: 2021-08-02

## 2021-08-02 RX ORDER — DILTIAZEM HYDROCHLORIDE 300 MG/1
300 CAPSULE, COATED, EXTENDED RELEASE ORAL DAILY
Qty: 90 CAPSULE | Refills: 1 | Status: SHIPPED | OUTPATIENT
Start: 2021-08-02 | End: 2021-11-15

## 2021-08-06 ENCOUNTER — PATIENT OUTREACH (OUTPATIENT)
Dept: ADMINISTRATIVE | Facility: OTHER | Age: 81
End: 2021-08-06

## 2021-08-09 ENCOUNTER — OFFICE VISIT (OUTPATIENT)
Dept: PAIN MEDICINE | Facility: CLINIC | Age: 81
End: 2021-08-09
Payer: MEDICARE

## 2021-08-09 VITALS
WEIGHT: 91.06 LBS | SYSTOLIC BLOOD PRESSURE: 130 MMHG | HEIGHT: 60 IN | HEART RATE: 100 BPM | DIASTOLIC BLOOD PRESSURE: 83 MMHG | BODY MASS INDEX: 17.88 KG/M2

## 2021-08-09 DIAGNOSIS — M48.00 CENTRAL STENOSIS OF SPINAL CANAL: Primary | ICD-10-CM

## 2021-08-09 DIAGNOSIS — Z01.818 PRE-OP TESTING: ICD-10-CM

## 2021-08-09 DIAGNOSIS — M25.561 BILATERAL CHRONIC KNEE PAIN: ICD-10-CM

## 2021-08-09 DIAGNOSIS — M17.0 BILATERAL PRIMARY OSTEOARTHRITIS OF KNEE: ICD-10-CM

## 2021-08-09 DIAGNOSIS — M51.36 DDD (DEGENERATIVE DISC DISEASE), LUMBAR: ICD-10-CM

## 2021-08-09 DIAGNOSIS — M25.562 BILATERAL CHRONIC KNEE PAIN: ICD-10-CM

## 2021-08-09 DIAGNOSIS — G89.29 BILATERAL CHRONIC KNEE PAIN: ICD-10-CM

## 2021-08-09 PROCEDURE — 99999 PR PBB SHADOW E&M-EST. PATIENT-LVL IV: CPT | Mod: PBBFAC,,, | Performed by: ANESTHESIOLOGY

## 2021-08-09 PROCEDURE — 99214 PR OFFICE/OUTPT VISIT, EST, LEVL IV, 30-39 MIN: ICD-10-PCS | Mod: S$PBB,,, | Performed by: ANESTHESIOLOGY

## 2021-08-09 PROCEDURE — 99214 OFFICE O/P EST MOD 30 MIN: CPT | Mod: PBBFAC,PN | Performed by: ANESTHESIOLOGY

## 2021-08-09 PROCEDURE — 99214 OFFICE O/P EST MOD 30 MIN: CPT | Mod: S$PBB,,, | Performed by: ANESTHESIOLOGY

## 2021-08-09 PROCEDURE — 99999 PR PBB SHADOW E&M-EST. PATIENT-LVL IV: ICD-10-PCS | Mod: PBBFAC,,, | Performed by: ANESTHESIOLOGY

## 2021-08-09 RX ORDER — MELOXICAM 7.5 MG/1
7.5 TABLET ORAL DAILY
Qty: 90 TABLET | Refills: 2 | Status: SHIPPED | OUTPATIENT
Start: 2021-08-09 | End: 2021-08-25

## 2021-08-11 ENCOUNTER — TELEPHONE (OUTPATIENT)
Dept: FAMILY MEDICINE | Facility: CLINIC | Age: 81
End: 2021-08-11

## 2021-08-11 DIAGNOSIS — R39.9 UTI SYMPTOMS: Primary | ICD-10-CM

## 2021-08-13 ENCOUNTER — LAB VISIT (OUTPATIENT)
Dept: LAB | Facility: HOSPITAL | Age: 81
End: 2021-08-13
Attending: NURSE PRACTITIONER
Payer: MEDICARE

## 2021-08-13 DIAGNOSIS — R39.9 UTI SYMPTOMS: ICD-10-CM

## 2021-08-13 LAB
BACTERIA #/AREA URNS HPF: ABNORMAL /HPF
BILIRUB UR QL STRIP: NEGATIVE
CLARITY UR: CLEAR
COLOR UR: YELLOW
GLUCOSE UR QL STRIP: NEGATIVE
HGB UR QL STRIP: NEGATIVE
KETONES UR QL STRIP: NEGATIVE
LEUKOCYTE ESTERASE UR QL STRIP: ABNORMAL
MICROSCOPIC COMMENT: ABNORMAL
NITRITE UR QL STRIP: NEGATIVE
PH UR STRIP: 6 [PH] (ref 5–8)
PROT UR QL STRIP: NEGATIVE
RBC #/AREA URNS HPF: 2 /HPF (ref 0–4)
SP GR UR STRIP: 1.01 (ref 1–1.03)
URN SPEC COLLECT METH UR: ABNORMAL
UROBILINOGEN UR STRIP-ACNC: NEGATIVE EU/DL
WBC #/AREA URNS HPF: 25 /HPF (ref 0–5)

## 2021-08-13 PROCEDURE — 87077 CULTURE AEROBIC IDENTIFY: CPT | Performed by: NURSE PRACTITIONER

## 2021-08-13 PROCEDURE — 87186 SC STD MICRODIL/AGAR DIL: CPT | Performed by: NURSE PRACTITIONER

## 2021-08-13 PROCEDURE — 87086 URINE CULTURE/COLONY COUNT: CPT | Performed by: NURSE PRACTITIONER

## 2021-08-13 PROCEDURE — 81000 URINALYSIS NONAUTO W/SCOPE: CPT | Performed by: NURSE PRACTITIONER

## 2021-08-13 PROCEDURE — 87088 URINE BACTERIA CULTURE: CPT | Performed by: NURSE PRACTITIONER

## 2021-08-16 ENCOUNTER — PATIENT MESSAGE (OUTPATIENT)
Dept: FAMILY MEDICINE | Facility: CLINIC | Age: 81
End: 2021-08-16

## 2021-08-16 DIAGNOSIS — N39.0 E-COLI UTI: Primary | ICD-10-CM

## 2021-08-16 DIAGNOSIS — B96.20 E-COLI UTI: Primary | ICD-10-CM

## 2021-08-16 LAB — BACTERIA UR CULT: ABNORMAL

## 2021-08-16 RX ORDER — AMOXICILLIN AND CLAVULANATE POTASSIUM 875; 125 MG/1; MG/1
1 TABLET, FILM COATED ORAL 2 TIMES DAILY
Qty: 20 TABLET | Refills: 0 | Status: SHIPPED | OUTPATIENT
Start: 2021-08-16 | End: 2021-08-25

## 2021-08-17 ENCOUNTER — TELEPHONE (OUTPATIENT)
Dept: FAMILY MEDICINE | Facility: CLINIC | Age: 81
End: 2021-08-17

## 2021-08-25 ENCOUNTER — OFFICE VISIT (OUTPATIENT)
Dept: FAMILY MEDICINE | Facility: CLINIC | Age: 81
End: 2021-08-25
Payer: MEDICARE

## 2021-08-25 VITALS
BODY MASS INDEX: 20.22 KG/M2 | RESPIRATION RATE: 17 BRPM | DIASTOLIC BLOOD PRESSURE: 72 MMHG | WEIGHT: 103 LBS | HEART RATE: 101 BPM | HEIGHT: 60 IN | SYSTOLIC BLOOD PRESSURE: 99 MMHG | OXYGEN SATURATION: 93 % | TEMPERATURE: 98 F

## 2021-08-25 DIAGNOSIS — R26.81 UNSTEADY GAIT WHEN WALKING: ICD-10-CM

## 2021-08-25 DIAGNOSIS — F41.9 ANXIETY: ICD-10-CM

## 2021-08-25 DIAGNOSIS — R53.1 WEAKNESS GENERALIZED: Primary | ICD-10-CM

## 2021-08-25 DIAGNOSIS — C34.90 CARCINOMA OF LUNG, UNSPECIFIED LATERALITY: ICD-10-CM

## 2021-08-25 DIAGNOSIS — R29.6 FALLING EPISODES: ICD-10-CM

## 2021-08-25 DIAGNOSIS — L89.622 DECUBITUS ULCER OF LEFT HEEL, STAGE 2: ICD-10-CM

## 2021-08-25 PROCEDURE — 99213 PR OFFICE/OUTPT VISIT, EST, LEVL III, 20-29 MIN: ICD-10-PCS | Mod: S$GLB,,, | Performed by: NURSE PRACTITIONER

## 2021-08-25 PROCEDURE — 99213 OFFICE O/P EST LOW 20 MIN: CPT | Mod: S$GLB,,, | Performed by: NURSE PRACTITIONER

## 2021-08-25 RX ORDER — INSULIN DEGLUDEC 100 U/ML
20 INJECTION, SOLUTION SUBCUTANEOUS
COMMUNITY
End: 2021-11-15

## 2021-08-25 RX ORDER — COLLAGENASE SANTYL 250 [ARB'U]/G
OINTMENT TOPICAL
COMMUNITY
Start: 2021-07-23

## 2021-08-25 RX ORDER — LEVOTHYROXINE SODIUM 100 UG/1
100 TABLET ORAL
COMMUNITY
End: 2021-11-03

## 2021-08-30 ENCOUNTER — PATIENT MESSAGE (OUTPATIENT)
Dept: FAMILY MEDICINE | Facility: CLINIC | Age: 81
End: 2021-08-30

## 2021-08-31 RX ORDER — FUROSEMIDE 40 MG/1
40 TABLET ORAL DAILY
Qty: 30 TABLET | Refills: 5 | Status: SHIPPED | OUTPATIENT
Start: 2021-08-31 | End: 2022-01-01

## 2021-09-05 ENCOUNTER — LAB VISIT (OUTPATIENT)
Dept: FAMILY MEDICINE | Facility: CLINIC | Age: 81
End: 2021-09-05
Payer: MEDICARE

## 2021-09-05 DIAGNOSIS — Z01.818 PRE-OP TESTING: ICD-10-CM

## 2021-09-05 PROCEDURE — U0003 INFECTIOUS AGENT DETECTION BY NUCLEIC ACID (DNA OR RNA); SEVERE ACUTE RESPIRATORY SYNDROME CORONAVIRUS 2 (SARS-COV-2) (CORONAVIRUS DISEASE [COVID-19]), AMPLIFIED PROBE TECHNIQUE, MAKING USE OF HIGH THROUGHPUT TECHNOLOGIES AS DESCRIBED BY CMS-2020-01-R: HCPCS | Performed by: ANESTHESIOLOGY

## 2021-09-05 PROCEDURE — U0005 INFEC AGEN DETEC AMPLI PROBE: HCPCS | Performed by: ANESTHESIOLOGY

## 2021-09-06 LAB
SARS-COV-2 RNA RESP QL NAA+PROBE: NOT DETECTED
SARS-COV-2- CYCLE NUMBER: NORMAL

## 2021-09-07 ENCOUNTER — PATIENT MESSAGE (OUTPATIENT)
Dept: FAMILY MEDICINE | Facility: CLINIC | Age: 81
End: 2021-09-07

## 2021-09-07 ENCOUNTER — TELEPHONE (OUTPATIENT)
Dept: FAMILY MEDICINE | Facility: CLINIC | Age: 81
End: 2021-09-07

## 2021-09-07 DIAGNOSIS — R39.9 UTI SYMPTOMS: Primary | ICD-10-CM

## 2021-09-07 RX ORDER — AMOXICILLIN AND CLAVULANATE POTASSIUM 875; 125 MG/1; MG/1
1 TABLET, FILM COATED ORAL 2 TIMES DAILY
Qty: 14 TABLET | Refills: 0 | Status: SHIPPED | OUTPATIENT
Start: 2021-09-07 | End: 2021-09-22

## 2021-09-08 ENCOUNTER — TELEPHONE (OUTPATIENT)
Dept: PAIN MEDICINE | Facility: CLINIC | Age: 81
End: 2021-09-08

## 2021-09-08 DIAGNOSIS — Z01.818 PRE-OP TESTING: Primary | ICD-10-CM

## 2021-09-15 ENCOUNTER — HOSPITAL ENCOUNTER (OUTPATIENT)
Facility: HOSPITAL | Age: 81
Discharge: HOME | End: 2021-09-15
Attending: ANESTHESIOLOGY | Admitting: ANESTHESIOLOGY
Payer: MEDICARE

## 2021-09-15 VITALS
DIASTOLIC BLOOD PRESSURE: 44 MMHG | HEART RATE: 82 BPM | SYSTOLIC BLOOD PRESSURE: 96 MMHG | HEIGHT: 60 IN | WEIGHT: 91 LBS | OXYGEN SATURATION: 96 % | TEMPERATURE: 98 F | RESPIRATION RATE: 14 BRPM | BODY MASS INDEX: 17.87 KG/M2

## 2021-09-15 DIAGNOSIS — M51.36 DEGENERATIVE DISC DISEASE, LUMBAR: Primary | ICD-10-CM

## 2021-09-15 LAB — SARS-COV-2 RDRP RESP QL NAA+PROBE: NEGATIVE

## 2021-09-15 PROCEDURE — 62323 PR INJ LUMBAR/SACRAL, W/IMAGING GUIDANCE: ICD-10-PCS | Mod: ,,, | Performed by: ANESTHESIOLOGY

## 2021-09-15 PROCEDURE — 62323 NJX INTERLAMINAR LMBR/SAC: CPT | Performed by: ANESTHESIOLOGY

## 2021-09-15 PROCEDURE — 25000003 PHARM REV CODE 250: Performed by: ANESTHESIOLOGY

## 2021-09-15 PROCEDURE — 25500020 PHARM REV CODE 255: Performed by: ANESTHESIOLOGY

## 2021-09-15 PROCEDURE — 62323 NJX INTERLAMINAR LMBR/SAC: CPT | Mod: ,,, | Performed by: ANESTHESIOLOGY

## 2021-09-15 PROCEDURE — 63600175 PHARM REV CODE 636 W HCPCS: Performed by: ANESTHESIOLOGY

## 2021-09-15 PROCEDURE — U0002 COVID-19 LAB TEST NON-CDC: HCPCS | Performed by: ANESTHESIOLOGY

## 2021-09-15 RX ORDER — FENTANYL CITRATE 50 UG/ML
INJECTION, SOLUTION INTRAMUSCULAR; INTRAVENOUS
Status: DISCONTINUED
Start: 2021-09-15 | End: 2021-09-15 | Stop reason: WASHOUT

## 2021-09-15 RX ORDER — LIDOCAINE HYDROCHLORIDE 10 MG/ML
INJECTION INFILTRATION; PERINEURAL
Status: DISCONTINUED
Start: 2021-09-15 | End: 2021-09-15 | Stop reason: HOSPADM

## 2021-09-15 RX ORDER — METHYLPREDNISOLONE ACETATE 80 MG/ML
INJECTION, SUSPENSION INTRA-ARTICULAR; INTRALESIONAL; INTRAMUSCULAR; SOFT TISSUE
Status: DISCONTINUED
Start: 2021-09-15 | End: 2021-09-15 | Stop reason: HOSPADM

## 2021-09-15 RX ORDER — MIDAZOLAM HYDROCHLORIDE 1 MG/ML
INJECTION INTRAMUSCULAR; INTRAVENOUS
Status: DISCONTINUED
Start: 2021-09-15 | End: 2021-09-15 | Stop reason: WASHOUT

## 2021-09-15 RX ORDER — ALPRAZOLAM 0.5 MG/1
1 TABLET, ORALLY DISINTEGRATING ORAL ONCE AS NEEDED
Status: COMPLETED | OUTPATIENT
Start: 2021-09-15 | End: 2021-09-15

## 2021-09-15 RX ORDER — LIDOCAINE HYDROCHLORIDE 10 MG/ML
INJECTION INFILTRATION; PERINEURAL
Status: DISCONTINUED | OUTPATIENT
Start: 2021-09-15 | End: 2021-09-15 | Stop reason: HOSPADM

## 2021-09-15 RX ORDER — METHYLPREDNISOLONE ACETATE 80 MG/ML
INJECTION, SUSPENSION INTRA-ARTICULAR; INTRALESIONAL; INTRAMUSCULAR; SOFT TISSUE
Status: DISCONTINUED | OUTPATIENT
Start: 2021-09-15 | End: 2021-09-15 | Stop reason: HOSPADM

## 2021-09-15 RX ADMIN — ALPRAZOLAM 1 MG: 0.5 TABLET, ORALLY DISINTEGRATING ORAL at 02:09

## 2021-09-22 ENCOUNTER — OFFICE VISIT (OUTPATIENT)
Dept: FAMILY MEDICINE | Facility: CLINIC | Age: 81
End: 2021-09-22
Payer: MEDICARE

## 2021-09-22 VITALS
OXYGEN SATURATION: 96 % | HEIGHT: 60 IN | RESPIRATION RATE: 18 BRPM | TEMPERATURE: 98 F | DIASTOLIC BLOOD PRESSURE: 74 MMHG | SYSTOLIC BLOOD PRESSURE: 124 MMHG | HEART RATE: 79 BPM | BODY MASS INDEX: 17.87 KG/M2 | WEIGHT: 91 LBS

## 2021-09-22 DIAGNOSIS — R53.1 WEAKNESS GENERALIZED: ICD-10-CM

## 2021-09-22 DIAGNOSIS — B37.2 YEAST INFECTION OF THE SKIN: ICD-10-CM

## 2021-09-22 DIAGNOSIS — R26.81 UNSTEADY GAIT WHEN WALKING: ICD-10-CM

## 2021-09-22 DIAGNOSIS — L89.622 DECUBITUS ULCER OF LEFT HEEL, STAGE 2: ICD-10-CM

## 2021-09-22 DIAGNOSIS — R39.9 UTI SYMPTOMS: Primary | ICD-10-CM

## 2021-09-22 LAB
BILIRUB SERPL-MCNC: ABNORMAL MG/DL
BLOOD URINE, POC: ABNORMAL
CLARITY, POC UA: ABNORMAL
COLOR, POC UA: YELLOW
GLUCOSE UR QL STRIP: 500
KETONES UR QL STRIP: ABNORMAL
LEUKOCYTE ESTERASE URINE, POC: ABNORMAL
NITRITE, POC UA: ABNORMAL
PH, POC UA: 6
PROTEIN, POC: ABNORMAL
SPECIFIC GRAVITY, POC UA: 1.01
UROBILINOGEN, POC UA: NORMAL

## 2021-09-22 PROCEDURE — 81002 URINALYSIS NONAUTO W/O SCOPE: CPT | Mod: PBBFAC | Performed by: NURSE PRACTITIONER

## 2021-09-22 PROCEDURE — 87086 URINE CULTURE/COLONY COUNT: CPT | Performed by: NURSE PRACTITIONER

## 2021-09-22 PROCEDURE — 99213 OFFICE O/P EST LOW 20 MIN: CPT | Mod: S$PBB,,, | Performed by: NURSE PRACTITIONER

## 2021-09-22 PROCEDURE — 99213 PR OFFICE/OUTPT VISIT, EST, LEVL III, 20-29 MIN: ICD-10-PCS | Mod: S$PBB,,, | Performed by: NURSE PRACTITIONER

## 2021-09-23 ENCOUNTER — PATIENT OUTREACH (OUTPATIENT)
Dept: ADMINISTRATIVE | Facility: OTHER | Age: 81
End: 2021-09-23

## 2021-09-25 ENCOUNTER — TELEPHONE (OUTPATIENT)
Dept: URGENT CARE | Facility: CLINIC | Age: 81
End: 2021-09-25

## 2021-09-25 LAB
BACTERIA UR CULT: NORMAL
BACTERIA UR CULT: NORMAL

## 2021-09-25 RX ORDER — NYSTATIN 100000 [USP'U]/G
POWDER TOPICAL 4 TIMES DAILY
Qty: 60 G | Refills: 2 | Status: SHIPPED | OUTPATIENT
Start: 2021-09-25 | End: 2022-01-01

## 2021-09-27 ENCOUNTER — PATIENT MESSAGE (OUTPATIENT)
Dept: FAMILY MEDICINE | Facility: CLINIC | Age: 81
End: 2021-09-27

## 2021-09-27 DIAGNOSIS — E87.6 HYPOKALEMIA DUE TO EXCESSIVE RENAL LOSS OF POTASSIUM: Primary | ICD-10-CM

## 2021-09-28 RX ORDER — POTASSIUM CHLORIDE 750 MG/1
10 CAPSULE, EXTENDED RELEASE ORAL 2 TIMES DAILY
Qty: 180 CAPSULE | Refills: 1 | Status: SHIPPED | OUTPATIENT
Start: 2021-09-28

## 2021-10-07 ENCOUNTER — PATIENT MESSAGE (OUTPATIENT)
Dept: FAMILY MEDICINE | Facility: CLINIC | Age: 81
End: 2021-10-07

## 2021-10-29 ENCOUNTER — TELEPHONE (OUTPATIENT)
Dept: FAMILY MEDICINE | Facility: CLINIC | Age: 81
End: 2021-10-29
Payer: MEDICARE

## 2021-11-03 ENCOUNTER — TELEPHONE (OUTPATIENT)
Dept: FAMILY MEDICINE | Facility: CLINIC | Age: 81
End: 2021-11-03

## 2021-11-03 ENCOUNTER — OFFICE VISIT (OUTPATIENT)
Dept: FAMILY MEDICINE | Facility: CLINIC | Age: 81
End: 2021-11-03
Payer: MEDICARE

## 2021-11-03 ENCOUNTER — HOSPITAL ENCOUNTER (OUTPATIENT)
Dept: RADIOLOGY | Facility: HOSPITAL | Age: 81
Discharge: HOME | End: 2021-11-03
Attending: NURSE PRACTITIONER
Payer: OTHER MISCELLANEOUS

## 2021-11-03 VITALS
OXYGEN SATURATION: 85 % | SYSTOLIC BLOOD PRESSURE: 92 MMHG | BODY MASS INDEX: 17.87 KG/M2 | RESPIRATION RATE: 18 BRPM | DIASTOLIC BLOOD PRESSURE: 68 MMHG | HEIGHT: 60 IN | HEART RATE: 121 BPM | WEIGHT: 91 LBS

## 2021-11-03 DIAGNOSIS — R53.1 WEAKNESS GENERALIZED: Primary | ICD-10-CM

## 2021-11-03 DIAGNOSIS — E03.9 ACQUIRED HYPOTHYROIDISM: ICD-10-CM

## 2021-11-03 DIAGNOSIS — F41.9 ANXIETY: ICD-10-CM

## 2021-11-03 DIAGNOSIS — F33.2 SEVERE EPISODE OF RECURRENT MAJOR DEPRESSIVE DISORDER, WITHOUT PSYCHOTIC FEATURES: ICD-10-CM

## 2021-11-03 DIAGNOSIS — E55.9 VITAMIN D DEFICIENCY: ICD-10-CM
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
## 2021-11-03 DIAGNOSIS — I47.10 PAROXYSMAL SVT (SUPRAVENTRICULAR TACHYCARDIA): ICD-10-CM

## 2021-11-03 DIAGNOSIS — M25.531 RIGHT WRIST PAIN: ICD-10-CM

## 2021-11-03 DIAGNOSIS — R26.81 UNSTEADY GAIT WHEN WALKING: ICD-10-CM

## 2021-11-03 PROCEDURE — 73110 XR WRIST COMPLETE 3 VIEWS RIGHT: ICD-10-PCS | Mod: 26,RT,, | Performed by: RADIOLOGY

## 2021-11-03 PROCEDURE — 99999 PR PBB SHADOW E&M-EST. PATIENT-LVL V: CPT | Mod: PBBFAC,,, | Performed by: NURSE PRACTITIONER

## 2021-11-03 PROCEDURE — 99999 PR PBB SHADOW E&M-EST. PATIENT-LVL V: ICD-10-PCS | Mod: PBBFAC,,, | Performed by: NURSE PRACTITIONER

## 2021-11-03 PROCEDURE — 99215 OFFICE O/P EST HI 40 MIN: CPT | Mod: PBBFAC | Performed by: NURSE PRACTITIONER

## 2021-11-03 PROCEDURE — 99214 OFFICE O/P EST MOD 30 MIN: CPT | Mod: S$PBB,,, | Performed by: NURSE PRACTITIONER

## 2021-11-03 PROCEDURE — 73110 X-RAY EXAM OF WRIST: CPT | Mod: TC,FY,RT

## 2021-11-03 PROCEDURE — 99214 PR OFFICE/OUTPT VISIT, EST, LEVL IV, 30-39 MIN: ICD-10-PCS | Mod: S$PBB,,, | Performed by: NURSE PRACTITIONER

## 2021-11-03 PROCEDURE — 73110 X-RAY EXAM OF WRIST: CPT | Mod: 26,RT,, | Performed by: RADIOLOGY

## 2021-11-03 RX ORDER — PAROXETINE 10 MG/1
10 TABLET, FILM COATED ORAL EVERY MORNING
Qty: 30 TABLET | Refills: 5 | Status: SHIPPED | OUTPATIENT
Start: 2021-11-03 | End: 2021-11-12 | Stop reason: SDUPTHER

## 2021-11-03 RX ORDER — LEVOTHYROXINE SODIUM 150 UG/1
150 TABLET ORAL
Qty: 90 TABLET | Refills: 1 | Status: SHIPPED | OUTPATIENT
Start: 2021-11-03 | End: 2021-11-15 | Stop reason: SDUPTHER

## 2021-11-05 ENCOUNTER — TELEPHONE (OUTPATIENT)
Dept: FAMILY MEDICINE | Facility: CLINIC | Age: 81
End: 2021-11-05
Payer: MEDICARE

## 2021-11-09 ENCOUNTER — PATIENT MESSAGE (OUTPATIENT)
Dept: FAMILY MEDICINE | Facility: CLINIC | Age: 81
End: 2021-11-09
Payer: MEDICARE

## 2021-11-10 ENCOUNTER — TELEPHONE (OUTPATIENT)
Dept: FAMILY MEDICINE | Facility: CLINIC | Age: 81
End: 2021-11-10
Payer: MEDICARE

## 2021-11-11 DIAGNOSIS — F33.2 SEVERE EPISODE OF RECURRENT MAJOR DEPRESSIVE DISORDER, WITHOUT PSYCHOTIC FEATURES: ICD-10-CM

## 2021-11-12 ENCOUNTER — PATIENT MESSAGE (OUTPATIENT)
Dept: FAMILY MEDICINE | Facility: CLINIC | Age: 81
End: 2021-11-12
Payer: MEDICARE

## 2021-11-12 RX ORDER — PAROXETINE 10 MG/1
10 TABLET, FILM COATED ORAL EVERY MORNING
Qty: 30 TABLET | Refills: 5 | Status: SHIPPED | OUTPATIENT
Start: 2021-11-12 | End: 2021-11-15 | Stop reason: SDUPTHER

## 2021-11-12 RX ORDER — PAROXETINE 10 MG/1
10 TABLET, FILM COATED ORAL EVERY MORNING
Qty: 30 TABLET | Refills: 5 | Status: SHIPPED | OUTPATIENT
Start: 2021-11-12 | End: 2021-11-12 | Stop reason: SDUPTHER

## 2021-11-15 ENCOUNTER — OFFICE VISIT (OUTPATIENT)
Dept: FAMILY MEDICINE | Facility: CLINIC | Age: 81
End: 2021-11-15
Payer: MEDICARE

## 2021-11-15 ENCOUNTER — TELEPHONE (OUTPATIENT)
Dept: FAMILY MEDICINE | Facility: CLINIC | Age: 81
End: 2021-11-15

## 2021-11-15 VITALS
OXYGEN SATURATION: 93 % | DIASTOLIC BLOOD PRESSURE: 52 MMHG | BODY MASS INDEX: 17.77 KG/M2 | SYSTOLIC BLOOD PRESSURE: 98 MMHG | HEART RATE: 74 BPM | RESPIRATION RATE: 17 BRPM | HEIGHT: 60 IN

## 2021-11-15 DIAGNOSIS — R53.1 WEAKNESS GENERALIZED: ICD-10-CM

## 2021-11-15 DIAGNOSIS — E03.9 ACQUIRED HYPOTHYROIDISM: ICD-10-CM

## 2021-11-15 DIAGNOSIS — Z79.4 TYPE 2 DIABETES MELLITUS WITH HYPERGLYCEMIA, WITH LONG-TERM CURRENT USE OF INSULIN: ICD-10-CM

## 2021-11-15 DIAGNOSIS — L89.622 DECUBITUS ULCER OF LEFT HEEL, STAGE 2: ICD-10-CM

## 2021-11-15 DIAGNOSIS — F33.2 SEVERE EPISODE OF RECURRENT MAJOR DEPRESSIVE DISORDER, WITHOUT PSYCHOTIC FEATURES: ICD-10-CM

## 2021-11-15 DIAGNOSIS — F41.1 GAD (GENERALIZED ANXIETY DISORDER): ICD-10-CM

## 2021-11-15 DIAGNOSIS — I95.2 HYPOTENSION DUE TO DRUGS: ICD-10-CM

## 2021-11-15 DIAGNOSIS — I47.10 PAROXYSMAL SVT (SUPRAVENTRICULAR TACHYCARDIA): ICD-10-CM

## 2021-11-15 DIAGNOSIS — Z09 HOSPITAL DISCHARGE FOLLOW-UP: Primary | ICD-10-CM

## 2021-11-15 DIAGNOSIS — E11.65 TYPE 2 DIABETES MELLITUS WITH HYPERGLYCEMIA, WITH LONG-TERM CURRENT USE OF INSULIN: ICD-10-CM

## 2021-11-15 DIAGNOSIS — L89.152 PRESSURE INJURY OF SACRAL REGION, STAGE 2: ICD-10-CM

## 2021-11-15 DIAGNOSIS — I10 ESSENTIAL HYPERTENSION: ICD-10-CM

## 2021-11-15 DIAGNOSIS — R26.81 UNSTEADY GAIT WHEN WALKING: ICD-10-CM

## 2021-11-15 DIAGNOSIS — I27.82 CHRONIC PULMONARY EMBOLISM WITHOUT ACUTE COR PULMONALE, UNSPECIFIED PULMONARY EMBOLISM TYPE: ICD-10-CM

## 2021-11-15 PROCEDURE — 99999 PR PBB SHADOW E&M-EST. PATIENT-LVL IV: ICD-10-PCS | Mod: PBBFAC,,, | Performed by: NURSE PRACTITIONER

## 2021-11-15 PROCEDURE — 99999 PR PBB SHADOW E&M-EST. PATIENT-LVL IV: CPT | Mod: PBBFAC,,, | Performed by: NURSE PRACTITIONER

## 2021-11-15 PROCEDURE — 99214 PR OFFICE/OUTPT VISIT, EST, LEVL IV, 30-39 MIN: ICD-10-PCS | Mod: S$PBB,,, | Performed by: NURSE PRACTITIONER

## 2021-11-15 PROCEDURE — 99214 OFFICE O/P EST MOD 30 MIN: CPT | Mod: S$PBB,,, | Performed by: NURSE PRACTITIONER

## 2021-11-15 PROCEDURE — 99214 OFFICE O/P EST MOD 30 MIN: CPT | Mod: PBBFAC | Performed by: NURSE PRACTITIONER

## 2021-11-15 RX ORDER — INSULIN DEGLUDEC 100 U/ML
25 INJECTION, SOLUTION SUBCUTANEOUS NIGHTLY
Qty: 6 PEN | Refills: 3
Start: 2021-11-15 | End: 2022-03-09 | Stop reason: SDUPTHER

## 2021-11-15 RX ORDER — LANCETS
1 EACH MISCELLANEOUS 2 TIMES DAILY PRN
Qty: 100 EACH | Refills: 11 | Status: SHIPPED | OUTPATIENT
Start: 2021-11-15 | End: 2021-11-22

## 2021-11-15 RX ORDER — LEVOTHYROXINE SODIUM 150 UG/1
150 TABLET ORAL
Qty: 90 TABLET | Refills: 0 | Status: SHIPPED | OUTPATIENT
Start: 2021-11-15 | End: 2022-02-13

## 2021-11-15 RX ORDER — CARVEDILOL 3.12 MG/1
3.12 TABLET ORAL 2 TIMES DAILY WITH MEALS
Qty: 180 TABLET | Refills: 1
Start: 2021-11-15 | End: 2021-12-15

## 2021-11-15 RX ORDER — DILTIAZEM HYDROCHLORIDE EXTENDED-RELEASE TABLETS 180 MG/1
180 TABLET, EXTENDED RELEASE ORAL DAILY
Qty: 90 TABLET | Refills: 1 | Status: SHIPPED | OUTPATIENT
Start: 2021-11-15 | End: 2022-02-09 | Stop reason: SDUPTHER

## 2021-11-15 RX ORDER — PAROXETINE 10 MG/1
10 TABLET, FILM COATED ORAL EVERY MORNING
Qty: 90 TABLET | Refills: 1 | Status: SHIPPED | OUTPATIENT
Start: 2021-11-15 | End: 2022-02-09 | Stop reason: SDUPTHER

## 2021-11-15 RX ORDER — ALPRAZOLAM 0.25 MG/1
0.25 TABLET ORAL 2 TIMES DAILY PRN
Qty: 60 TABLET | Refills: 2 | Status: SHIPPED | OUTPATIENT
Start: 2021-11-15 | End: 2022-02-03 | Stop reason: SDUPTHER

## 2021-11-16 ENCOUNTER — PATIENT MESSAGE (OUTPATIENT)
Dept: FAMILY MEDICINE | Facility: CLINIC | Age: 81
End: 2021-11-16
Payer: MEDICARE

## 2021-11-16 ENCOUNTER — TELEPHONE (OUTPATIENT)
Dept: FAMILY MEDICINE | Facility: CLINIC | Age: 81
End: 2021-11-16
Payer: MEDICARE

## 2021-11-17 ENCOUNTER — TELEPHONE (OUTPATIENT)
Dept: FAMILY MEDICINE | Facility: CLINIC | Age: 81
End: 2021-11-17
Payer: MEDICARE

## 2021-11-17 DIAGNOSIS — R13.10 DYSPHAGIA, UNSPECIFIED TYPE: Primary | ICD-10-CM

## 2021-11-22 ENCOUNTER — TELEPHONE (OUTPATIENT)
Dept: FAMILY MEDICINE | Facility: CLINIC | Age: 81
End: 2021-11-22
Payer: MEDICARE

## 2021-11-22 RX ORDER — LANCETS
EACH MISCELLANEOUS
Qty: 200 EACH | Refills: 1 | Status: SHIPPED | OUTPATIENT
Start: 2021-11-22

## 2021-11-22 RX ORDER — INSULIN PUMP SYRINGE, 3 ML
EACH MISCELLANEOUS
Qty: 1 EACH | Refills: 3 | Status: SHIPPED | OUTPATIENT
Start: 2021-11-22 | End: 2022-01-01 | Stop reason: SDUPTHER

## 2021-12-01 ENCOUNTER — NURSE TRIAGE (OUTPATIENT)
Dept: ADMINISTRATIVE | Facility: CLINIC | Age: 81
End: 2021-12-01
Payer: MEDICARE

## 2021-12-03 ENCOUNTER — TELEPHONE (OUTPATIENT)
Dept: FAMILY MEDICINE | Facility: CLINIC | Age: 81
End: 2021-12-03
Payer: MEDICARE

## 2021-12-03 DIAGNOSIS — F33.1 MODERATE EPISODE OF RECURRENT MAJOR DEPRESSIVE DISORDER: Primary | ICD-10-CM

## 2021-12-03 RX ORDER — ARIPIPRAZOLE 2 MG/1
2 TABLET ORAL DAILY
Qty: 30 TABLET | Refills: 1 | Status: SHIPPED | OUTPATIENT
Start: 2021-12-03 | End: 2022-02-03 | Stop reason: SDUPTHER

## 2021-12-09 ENCOUNTER — PATIENT OUTREACH (OUTPATIENT)
Dept: ADMINISTRATIVE | Facility: OTHER | Age: 81
End: 2021-12-09
Payer: MEDICARE

## 2021-12-10 ENCOUNTER — OFFICE VISIT (OUTPATIENT)
Dept: PAIN MEDICINE | Facility: CLINIC | Age: 81
End: 2021-12-10
Payer: MEDICARE

## 2021-12-10 VITALS
HEIGHT: 60 IN | BODY MASS INDEX: 17.87 KG/M2 | DIASTOLIC BLOOD PRESSURE: 72 MMHG | SYSTOLIC BLOOD PRESSURE: 113 MMHG | HEART RATE: 116 BPM | WEIGHT: 91 LBS

## 2021-12-10 DIAGNOSIS — G89.29 BILATERAL CHRONIC KNEE PAIN: ICD-10-CM

## 2021-12-10 DIAGNOSIS — M25.562 BILATERAL CHRONIC KNEE PAIN: ICD-10-CM

## 2021-12-10 DIAGNOSIS — M17.0 BILATERAL PRIMARY OSTEOARTHRITIS OF KNEE: Primary | ICD-10-CM

## 2021-12-10 DIAGNOSIS — M25.561 BILATERAL CHRONIC KNEE PAIN: ICD-10-CM

## 2021-12-10 PROCEDURE — 20610 DRAIN/INJ JOINT/BURSA W/O US: CPT | Mod: 50,PBBFAC,PN | Performed by: ANESTHESIOLOGY

## 2021-12-10 PROCEDURE — 99999 PR PBB SHADOW E&M-EST. PATIENT-LVL III: ICD-10-PCS | Mod: PBBFAC,,, | Performed by: ANESTHESIOLOGY

## 2021-12-10 PROCEDURE — 99213 OFFICE O/P EST LOW 20 MIN: CPT | Mod: PBBFAC,PN,25 | Performed by: ANESTHESIOLOGY

## 2021-12-10 PROCEDURE — 99214 PR OFFICE/OUTPT VISIT, EST, LEVL IV, 30-39 MIN: ICD-10-PCS | Mod: 25,S$PBB,, | Performed by: ANESTHESIOLOGY

## 2021-12-10 PROCEDURE — 99214 OFFICE O/P EST MOD 30 MIN: CPT | Mod: 25,S$PBB,, | Performed by: ANESTHESIOLOGY

## 2021-12-10 PROCEDURE — 99999 PR PBB SHADOW E&M-EST. PATIENT-LVL III: CPT | Mod: PBBFAC,,, | Performed by: ANESTHESIOLOGY

## 2021-12-10 PROCEDURE — 20610 LARGE JOINT ASPIRATION/INJECTION: BILATERAL KNEE: ICD-10-PCS | Mod: 50,S$PBB,, | Performed by: ANESTHESIOLOGY

## 2021-12-10 RX ADMIN — Medication 48 MG: at 02:12

## 2021-12-15 ENCOUNTER — TELEPHONE (OUTPATIENT)
Dept: FAMILY MEDICINE | Facility: CLINIC | Age: 81
End: 2021-12-15

## 2021-12-15 ENCOUNTER — OFFICE VISIT (OUTPATIENT)
Dept: FAMILY MEDICINE | Facility: CLINIC | Age: 81
End: 2021-12-15
Payer: MEDICARE

## 2021-12-15 ENCOUNTER — LAB VISIT (OUTPATIENT)
Dept: LAB | Facility: HOSPITAL | Age: 81
End: 2021-12-15
Attending: NURSE PRACTITIONER
Payer: MEDICARE

## 2021-12-15 VITALS
TEMPERATURE: 98 F | BODY MASS INDEX: 17.87 KG/M2 | SYSTOLIC BLOOD PRESSURE: 120 MMHG | RESPIRATION RATE: 18 BRPM | WEIGHT: 91 LBS | HEART RATE: 97 BPM | DIASTOLIC BLOOD PRESSURE: 87 MMHG | OXYGEN SATURATION: 95 % | HEIGHT: 60 IN

## 2021-12-15 DIAGNOSIS — I47.10 PAROXYSMAL SVT (SUPRAVENTRICULAR TACHYCARDIA): ICD-10-CM

## 2021-12-15 DIAGNOSIS — E78.2 MIXED HYPERLIPIDEMIA: ICD-10-CM

## 2021-12-15 DIAGNOSIS — E03.9 ACQUIRED HYPOTHYROIDISM: ICD-10-CM

## 2021-12-15 DIAGNOSIS — R00.0 TACHYCARDIA: ICD-10-CM

## 2021-12-15 DIAGNOSIS — R53.81 DEBILITATED: Primary | ICD-10-CM

## 2021-12-15 DIAGNOSIS — E11.9 ENCOUNTER FOR DIABETIC FOOT EXAM: ICD-10-CM

## 2021-12-15 DIAGNOSIS — R53.1 WEAKNESS GENERALIZED: ICD-10-CM

## 2021-12-15 DIAGNOSIS — F41.1 GAD (GENERALIZED ANXIETY DISORDER): ICD-10-CM

## 2021-12-15 DIAGNOSIS — F33.1 MODERATE EPISODE OF RECURRENT MAJOR DEPRESSIVE DISORDER: ICD-10-CM

## 2021-12-15 DIAGNOSIS — I10 ESSENTIAL HYPERTENSION: ICD-10-CM

## 2021-12-15 LAB
ALBUMIN SERPL BCP-MCNC: 3.1 G/DL (ref 3.5–5.2)
ALP SERPL-CCNC: 171 U/L (ref 55–135)
ALT SERPL W/O P-5'-P-CCNC: 26 U/L (ref 10–44)
ANION GAP SERPL CALC-SCNC: 12 MMOL/L (ref 8–16)
AST SERPL-CCNC: 43 U/L (ref 10–40)
BASOPHILS # BLD AUTO: 0.05 K/UL (ref 0–0.2)
BASOPHILS NFR BLD: 0.6 % (ref 0–1.9)
BILIRUB SERPL-MCNC: 0.5 MG/DL (ref 0.1–1)
BUN SERPL-MCNC: 12 MG/DL (ref 8–23)
CALCIUM SERPL-MCNC: 10 MG/DL (ref 8.7–10.5)
CHLORIDE SERPL-SCNC: 97 MMOL/L (ref 95–110)
CO2 SERPL-SCNC: 25 MMOL/L (ref 23–29)
CREAT SERPL-MCNC: 1.4 MG/DL (ref 0.5–1.4)
DIFFERENTIAL METHOD: ABNORMAL
EOSINOPHIL # BLD AUTO: 0.3 K/UL (ref 0–0.5)
EOSINOPHIL NFR BLD: 3.3 % (ref 0–8)
ERYTHROCYTE [DISTWIDTH] IN BLOOD BY AUTOMATED COUNT: 13.6 % (ref 11.5–14.5)
EST. GFR  (AFRICAN AMERICAN): 40.4 ML/MIN/1.73 M^2
EST. GFR  (NON AFRICAN AMERICAN): 35 ML/MIN/1.73 M^2
GLUCOSE SERPL-MCNC: 333 MG/DL (ref 70–110)
HCT VFR BLD AUTO: 33 % (ref 37–48.5)
HGB BLD-MCNC: 10.5 G/DL (ref 12–16)
IMM GRANULOCYTES # BLD AUTO: 0.02 K/UL (ref 0–0.04)
IMM GRANULOCYTES NFR BLD AUTO: 0.3 % (ref 0–0.5)
LYMPHOCYTES # BLD AUTO: 2.4 K/UL (ref 1–4.8)
LYMPHOCYTES NFR BLD: 29.5 % (ref 18–48)
MCH RBC QN AUTO: 28.5 PG (ref 27–31)
MCHC RBC AUTO-ENTMCNC: 31.8 G/DL (ref 32–36)
MCV RBC AUTO: 90 FL (ref 82–98)
MONOCYTES # BLD AUTO: 0.6 K/UL (ref 0.3–1)
MONOCYTES NFR BLD: 7.9 % (ref 4–15)
NEUTROPHILS # BLD AUTO: 4.7 K/UL (ref 1.8–7.7)
NEUTROPHILS NFR BLD: 58.4 % (ref 38–73)
NRBC BLD-RTO: 0 /100 WBC
PLATELET # BLD AUTO: 379 K/UL (ref 150–450)
PMV BLD AUTO: 9.3 FL (ref 9.2–12.9)
POTASSIUM SERPL-SCNC: 4 MMOL/L (ref 3.5–5.1)
PROT SERPL-MCNC: 8 G/DL (ref 6–8.4)
RBC # BLD AUTO: 3.68 M/UL (ref 4–5.4)
SODIUM SERPL-SCNC: 134 MMOL/L (ref 136–145)
WBC # BLD AUTO: 7.96 K/UL (ref 3.9–12.7)

## 2021-12-15 PROCEDURE — 99999 PR PBB SHADOW E&M-EST. PATIENT-LVL V: CPT | Mod: PBBFAC,,, | Performed by: NURSE PRACTITIONER

## 2021-12-15 PROCEDURE — 99214 PR OFFICE/OUTPT VISIT, EST, LEVL IV, 30-39 MIN: ICD-10-PCS | Mod: S$PBB,,, | Performed by: NURSE PRACTITIONER

## 2021-12-15 PROCEDURE — 99215 OFFICE O/P EST HI 40 MIN: CPT | Mod: PBBFAC | Performed by: NURSE PRACTITIONER

## 2021-12-15 PROCEDURE — 99214 OFFICE O/P EST MOD 30 MIN: CPT | Mod: S$PBB,,, | Performed by: NURSE PRACTITIONER

## 2021-12-15 PROCEDURE — 36415 COLL VENOUS BLD VENIPUNCTURE: CPT | Performed by: NURSE PRACTITIONER

## 2021-12-15 PROCEDURE — 99999 PR PBB SHADOW E&M-EST. PATIENT-LVL V: ICD-10-PCS | Mod: PBBFAC,,, | Performed by: NURSE PRACTITIONER

## 2021-12-15 PROCEDURE — 80053 COMPREHEN METABOLIC PANEL: CPT | Performed by: NURSE PRACTITIONER

## 2021-12-15 PROCEDURE — 85025 COMPLETE CBC W/AUTO DIFF WBC: CPT | Performed by: NURSE PRACTITIONER

## 2021-12-15 RX ORDER — CARVEDILOL 6.25 MG/1
6.25 TABLET ORAL 2 TIMES DAILY WITH MEALS
Qty: 60 TABLET | Refills: 11
Start: 2021-12-15 | End: 2022-02-03 | Stop reason: SDUPTHER

## 2021-12-20 ENCOUNTER — PATIENT MESSAGE (OUTPATIENT)
Dept: FAMILY MEDICINE | Facility: CLINIC | Age: 81
End: 2021-12-20
Payer: MEDICARE

## 2021-12-22 ENCOUNTER — PATIENT MESSAGE (OUTPATIENT)
Dept: FAMILY MEDICINE | Facility: CLINIC | Age: 81
End: 2021-12-22
Payer: MEDICARE

## 2021-12-27 ENCOUNTER — TELEPHONE (OUTPATIENT)
Dept: FAMILY MEDICINE | Facility: CLINIC | Age: 81
End: 2021-12-27
Payer: MEDICARE

## 2021-12-27 DIAGNOSIS — R39.9 UTI SYMPTOMS: Primary | ICD-10-CM

## 2021-12-29 ENCOUNTER — TELEPHONE (OUTPATIENT)
Dept: FAMILY MEDICINE | Facility: CLINIC | Age: 81
End: 2021-12-29
Payer: MEDICARE

## 2022-01-01 ENCOUNTER — EXTERNAL HOME HEALTH (OUTPATIENT)
Dept: HOME HEALTH SERVICES | Facility: HOSPITAL | Age: 82
End: 2022-01-01
Payer: MEDICARE

## 2022-01-01 ENCOUNTER — DOCUMENT SCAN (OUTPATIENT)
Dept: HOME HEALTH SERVICES | Facility: HOSPITAL | Age: 82
End: 2022-01-01
Payer: MEDICARE

## 2022-01-01 ENCOUNTER — TELEPHONE (OUTPATIENT)
Dept: FAMILY MEDICINE | Facility: CLINIC | Age: 82
End: 2022-01-01
Payer: MEDICARE

## 2022-01-01 ENCOUNTER — PATIENT MESSAGE (OUTPATIENT)
Dept: FAMILY MEDICINE | Facility: CLINIC | Age: 82
End: 2022-01-01
Payer: MEDICARE

## 2022-01-01 ENCOUNTER — OFFICE VISIT (OUTPATIENT)
Dept: FAMILY MEDICINE | Facility: CLINIC | Age: 82
End: 2022-01-01
Payer: MEDICARE

## 2022-01-01 ENCOUNTER — OFFICE VISIT (OUTPATIENT)
Dept: PAIN MEDICINE | Facility: CLINIC | Age: 82
End: 2022-01-01
Payer: MEDICARE

## 2022-01-01 ENCOUNTER — HOSPITAL ENCOUNTER (OUTPATIENT)
Facility: HOSPITAL | Age: 82
Discharge: HOME | End: 2022-11-30
Attending: ANESTHESIOLOGY | Admitting: ANESTHESIOLOGY
Payer: MEDICARE

## 2022-01-01 ENCOUNTER — OFFICE VISIT (OUTPATIENT)
Dept: PODIATRY | Facility: CLINIC | Age: 82
End: 2022-01-01
Payer: MEDICARE

## 2022-01-01 ENCOUNTER — HOSPITAL ENCOUNTER (EMERGENCY)
Facility: HOSPITAL | Age: 82
Discharge: ANOTHER HOSPITAL | End: 2022-06-18
Attending: FAMILY MEDICINE
Payer: MEDICARE

## 2022-01-01 ENCOUNTER — TELEPHONE (OUTPATIENT)
Dept: PAIN MEDICINE | Facility: CLINIC | Age: 82
End: 2022-01-01
Payer: MEDICARE

## 2022-01-01 ENCOUNTER — LAB VISIT (OUTPATIENT)
Dept: LAB | Facility: HOSPITAL | Age: 82
End: 2022-01-01
Attending: NURSE PRACTITIONER
Payer: MEDICARE

## 2022-01-01 ENCOUNTER — TELEPHONE (OUTPATIENT)
Dept: FAMILY MEDICINE | Facility: CLINIC | Age: 82
End: 2022-01-01

## 2022-01-01 ENCOUNTER — DOCUMENTATION ONLY (OUTPATIENT)
Dept: SURGERY | Facility: HOSPITAL | Age: 82
End: 2022-01-01

## 2022-01-01 ENCOUNTER — HOSPITAL ENCOUNTER (OUTPATIENT)
Facility: HOSPITAL | Age: 82
Discharge: HOME | End: 2022-09-14
Attending: ANESTHESIOLOGY | Admitting: ANESTHESIOLOGY
Payer: MEDICARE

## 2022-01-01 ENCOUNTER — PATIENT OUTREACH (OUTPATIENT)
Dept: HOME HEALTH SERVICES | Facility: HOSPITAL | Age: 82
End: 2022-01-01
Payer: MEDICARE

## 2022-01-01 VITALS
WEIGHT: 86 LBS | BODY MASS INDEX: 16.88 KG/M2 | RESPIRATION RATE: 16 BRPM | DIASTOLIC BLOOD PRESSURE: 69 MMHG | SYSTOLIC BLOOD PRESSURE: 121 MMHG | HEART RATE: 86 BPM | BODY MASS INDEX: 16.88 KG/M2 | TEMPERATURE: 98 F | HEIGHT: 60 IN | HEIGHT: 60 IN | WEIGHT: 86 LBS

## 2022-01-01 VITALS
DIASTOLIC BLOOD PRESSURE: 74 MMHG | BODY MASS INDEX: 17.47 KG/M2 | HEIGHT: 60 IN | HEART RATE: 73 BPM | SYSTOLIC BLOOD PRESSURE: 124 MMHG | RESPIRATION RATE: 16 BRPM | OXYGEN SATURATION: 95 % | TEMPERATURE: 97 F | WEIGHT: 89 LBS

## 2022-01-01 VITALS
SYSTOLIC BLOOD PRESSURE: 139 MMHG | HEART RATE: 92 BPM | HEIGHT: 60 IN | DIASTOLIC BLOOD PRESSURE: 76 MMHG | WEIGHT: 87.06 LBS | RESPIRATION RATE: 15 BRPM | OXYGEN SATURATION: 95 % | BODY MASS INDEX: 17.09 KG/M2

## 2022-01-01 VITALS
TEMPERATURE: 98 F | DIASTOLIC BLOOD PRESSURE: 70 MMHG | HEIGHT: 60 IN | OXYGEN SATURATION: 99 % | HEIGHT: 60 IN | DIASTOLIC BLOOD PRESSURE: 53 MMHG | SYSTOLIC BLOOD PRESSURE: 111 MMHG | SYSTOLIC BLOOD PRESSURE: 111 MMHG | HEART RATE: 108 BPM | WEIGHT: 87 LBS | RESPIRATION RATE: 28 BRPM | HEART RATE: 85 BPM | BODY MASS INDEX: 17.08 KG/M2 | BODY MASS INDEX: 14.08 KG/M2

## 2022-01-01 VITALS
DIASTOLIC BLOOD PRESSURE: 71 MMHG | TEMPERATURE: 98 F | WEIGHT: 86 LBS | OXYGEN SATURATION: 97 % | RESPIRATION RATE: 14 BRPM | BODY MASS INDEX: 16.88 KG/M2 | HEIGHT: 60 IN | SYSTOLIC BLOOD PRESSURE: 131 MMHG | HEART RATE: 90 BPM

## 2022-01-01 VITALS
TEMPERATURE: 98 F | RESPIRATION RATE: 14 BRPM | DIASTOLIC BLOOD PRESSURE: 68 MMHG | BODY MASS INDEX: 16.88 KG/M2 | OXYGEN SATURATION: 96 % | HEART RATE: 136 BPM | SYSTOLIC BLOOD PRESSURE: 108 MMHG | WEIGHT: 86 LBS | HEIGHT: 60 IN

## 2022-01-01 VITALS
DIASTOLIC BLOOD PRESSURE: 80 MMHG | TEMPERATURE: 98 F | OXYGEN SATURATION: 98 % | HEART RATE: 96 BPM | SYSTOLIC BLOOD PRESSURE: 152 MMHG | RESPIRATION RATE: 12 BRPM

## 2022-01-01 DIAGNOSIS — M25.552 CHRONIC HIP PAIN, BILATERAL: ICD-10-CM

## 2022-01-01 DIAGNOSIS — R23.1 COLD AND CLAMMY SKIN: ICD-10-CM

## 2022-01-01 DIAGNOSIS — R20.9 COLD AND CLAMMY SKIN: ICD-10-CM

## 2022-01-01 DIAGNOSIS — F51.01 PRIMARY INSOMNIA: ICD-10-CM

## 2022-01-01 DIAGNOSIS — R41.0 CONFUSION ASSOCIATED WITH INFECTION: ICD-10-CM

## 2022-01-01 DIAGNOSIS — I10 ESSENTIAL HYPERTENSION: ICD-10-CM

## 2022-01-01 DIAGNOSIS — C34.32 MALIGNANT NEOPLASM OF LOWER LOBE OF LEFT LUNG: Primary | ICD-10-CM

## 2022-01-01 DIAGNOSIS — E03.9 ACQUIRED HYPOTHYROIDISM: ICD-10-CM

## 2022-01-01 DIAGNOSIS — R53.1 WEAKNESS: ICD-10-CM

## 2022-01-01 DIAGNOSIS — R53.81 DEBILITATED: ICD-10-CM

## 2022-01-01 DIAGNOSIS — M25.551 CHRONIC HIP PAIN, BILATERAL: ICD-10-CM

## 2022-01-01 DIAGNOSIS — R53.1 WEAKNESS GENERALIZED: ICD-10-CM

## 2022-01-01 DIAGNOSIS — M53.3 SACROILIAC JOINT DYSFUNCTION OF BOTH SIDES: Primary | ICD-10-CM

## 2022-01-01 DIAGNOSIS — R52 PAIN: ICD-10-CM

## 2022-01-01 DIAGNOSIS — E11.42 TYPE 2 DIABETES MELLITUS WITH DIABETIC POLYNEUROPATHY, WITHOUT LONG-TERM CURRENT USE OF INSULIN: Primary | ICD-10-CM

## 2022-01-01 DIAGNOSIS — Z79.899 BENZODIAZEPINE CONTRACT EXISTS: ICD-10-CM

## 2022-01-01 DIAGNOSIS — M51.36 DDD (DEGENERATIVE DISC DISEASE), LUMBAR: ICD-10-CM

## 2022-01-01 DIAGNOSIS — G45.9 TIA (TRANSIENT ISCHEMIC ATTACK): ICD-10-CM

## 2022-01-01 DIAGNOSIS — R82.79 URINE CULTURE POSITIVE: Primary | ICD-10-CM

## 2022-01-01 DIAGNOSIS — B96.20 E-COLI UTI: Primary | ICD-10-CM

## 2022-01-01 DIAGNOSIS — F03.90 DEMENTIA WITHOUT BEHAVIORAL DISTURBANCE: ICD-10-CM

## 2022-01-01 DIAGNOSIS — R19.7 DIARRHEA OF PRESUMED INFECTIOUS ORIGIN: Primary | ICD-10-CM

## 2022-01-01 DIAGNOSIS — N39.0 CHRONIC UTI: ICD-10-CM

## 2022-01-01 DIAGNOSIS — E86.0 DEHYDRATION: ICD-10-CM

## 2022-01-01 DIAGNOSIS — F41.1 GAD (GENERALIZED ANXIETY DISORDER): ICD-10-CM

## 2022-01-01 DIAGNOSIS — R19.7 DIARRHEA, UNSPECIFIED TYPE: ICD-10-CM

## 2022-01-01 DIAGNOSIS — G89.29 CHRONIC HIP PAIN, BILATERAL: ICD-10-CM

## 2022-01-01 DIAGNOSIS — E03.9 HYPOTHYROIDISM (ACQUIRED): ICD-10-CM

## 2022-01-01 DIAGNOSIS — Z09 HOSPITAL DISCHARGE FOLLOW-UP: Primary | ICD-10-CM

## 2022-01-01 DIAGNOSIS — N18.32 STAGE 3B CHRONIC KIDNEY DISEASE: ICD-10-CM

## 2022-01-01 DIAGNOSIS — N30.00 ACUTE CYSTITIS WITHOUT HEMATURIA: ICD-10-CM

## 2022-01-01 DIAGNOSIS — R41.0 CONFUSION: ICD-10-CM

## 2022-01-01 DIAGNOSIS — L97.409 DIABETIC ULCER OF HEEL ASSOCIATED WITH TYPE 2 DIABETES MELLITUS, UNSPECIFIED LATERALITY, UNSPECIFIED ULCER STAGE: ICD-10-CM

## 2022-01-01 DIAGNOSIS — N39.0 E-COLI UTI: Primary | ICD-10-CM

## 2022-01-01 DIAGNOSIS — C34.90 ADENOCARCINOMA OF LUNG, UNSPECIFIED LATERALITY: ICD-10-CM

## 2022-01-01 DIAGNOSIS — R82.79 POSITIVE URINE CULTURE: ICD-10-CM

## 2022-01-01 DIAGNOSIS — E11.621 DIABETIC ULCER OF HEEL ASSOCIATED WITH TYPE 2 DIABETES MELLITUS, UNSPECIFIED LATERALITY, UNSPECIFIED ULCER STAGE: ICD-10-CM

## 2022-01-01 DIAGNOSIS — L60.0 INGROWN NAIL: ICD-10-CM

## 2022-01-01 DIAGNOSIS — A49.8 BACTERIAL INFECTION DUE TO MORGANELLA MORGANII: Primary | ICD-10-CM

## 2022-01-01 DIAGNOSIS — I47.10 PAROXYSMAL SVT (SUPRAVENTRICULAR TACHYCARDIA): ICD-10-CM

## 2022-01-01 DIAGNOSIS — B99.9 CONFUSION ASSOCIATED WITH INFECTION: ICD-10-CM

## 2022-01-01 DIAGNOSIS — N39.0 FREQUENT UTI: ICD-10-CM

## 2022-01-01 DIAGNOSIS — M53.3 SACROILIAC JOINT DYSFUNCTION: Primary | ICD-10-CM

## 2022-01-01 DIAGNOSIS — F01.A11 MILD VASCULAR DEMENTIA WITH AGITATION: ICD-10-CM

## 2022-01-01 DIAGNOSIS — M48.00 CENTRAL STENOSIS OF SPINAL CANAL: ICD-10-CM

## 2022-01-01 DIAGNOSIS — E11.9 COMPREHENSIVE DIABETIC FOOT EXAMINATION, TYPE 2 DM, ENCOUNTER FOR: ICD-10-CM

## 2022-01-01 DIAGNOSIS — M47.896 OTHER SPONDYLOSIS, LUMBAR REGION: ICD-10-CM

## 2022-01-01 DIAGNOSIS — I63.9 CEREBRAL INFARCTION, UNSPECIFIED MECHANISM: ICD-10-CM

## 2022-01-01 DIAGNOSIS — Z86.73 H/O: STROKE: ICD-10-CM

## 2022-01-01 DIAGNOSIS — F33.2 SEVERE EPISODE OF RECURRENT MAJOR DEPRESSIVE DISORDER, WITHOUT PSYCHOTIC FEATURES: ICD-10-CM

## 2022-01-01 DIAGNOSIS — Z78.0 MENOPAUSE: ICD-10-CM

## 2022-01-01 LAB
6MAM UR QL: NOT DETECTED
7AMINOCLONAZEPAM UR QL: NOT DETECTED
A-OH ALPRAZ UR QL: PRESENT
ABO + RH BLD: NORMAL
ALBUMIN SERPL BCP-MCNC: 2.4 G/DL (ref 3.5–5.2)
ALBUMIN SERPL BCP-MCNC: 2.4 G/DL (ref 3.5–5.2)
ALBUMIN SERPL BCP-MCNC: 3.1 G/DL (ref 3.5–5.2)
ALBUMIN/CREAT UR: 231.9 UG/MG (ref 0–30)
ALLENS TEST: ABNORMAL
ALP SERPL-CCNC: 248 U/L (ref 55–135)
ALP SERPL-CCNC: 250 U/L (ref 55–135)
ALP SERPL-CCNC: 265 U/L (ref 55–135)
ALPHA-OH-MIDAZOLAM: NOT DETECTED
ALPRAZ UR QL: PRESENT
ALT SERPL W/O P-5'-P-CCNC: 23 U/L (ref 10–44)
ALT SERPL W/O P-5'-P-CCNC: 23 U/L (ref 10–44)
ALT SERPL W/O P-5'-P-CCNC: 30 U/L (ref 10–44)
AMPHET UR QL SCN: NOT DETECTED
AMPHET+METHAMPHET UR QL: NEGATIVE
ANION GAP SERPL CALC-SCNC: 13 MMOL/L (ref 8–16)
ANION GAP SERPL CALC-SCNC: 19 MMOL/L (ref 8–16)
ANION GAP SERPL CALC-SCNC: 19 MMOL/L (ref 8–16)
ANNOTATION COMMENT IMP: NORMAL
ANNOTATION COMMENT IMP: NORMAL
APAP SERPL-MCNC: <3 UG/ML (ref 10–20)
AST SERPL-CCNC: 40 U/L (ref 10–40)
AST SERPL-CCNC: 41 U/L (ref 10–40)
AST SERPL-CCNC: 48 U/L (ref 10–40)
BACTERIA #/AREA URNS HPF: ABNORMAL /HPF
BACTERIA BLD CULT: ABNORMAL
BACTERIA BLD CULT: NORMAL
BACTERIA UR CULT: ABNORMAL
BARBITURATES UR QL SCN>200 NG/ML: NEGATIVE
BARBITURATES UR QL: NOT DETECTED
BASOPHILS # BLD AUTO: 0.06 K/UL (ref 0–0.2)
BASOPHILS # BLD AUTO: ABNORMAL K/UL (ref 0–0.2)
BASOPHILS NFR BLD: 0 % (ref 0–1.9)
BASOPHILS NFR BLD: 0.7 % (ref 0–1.9)
BENZODIAZ UR QL SCN>200 NG/ML: ABNORMAL
BILIRUB SERPL-MCNC: 0.3 MG/DL (ref 0.1–1)
BILIRUB SERPL-MCNC: 0.4 MG/DL (ref 0.1–1)
BILIRUB SERPL-MCNC: 0.4 MG/DL (ref 0.1–1)
BILIRUB UR QL STRIP: ABNORMAL
BLD GP AB SCN CELLS X3 SERPL QL: NORMAL
BLD PROD TYP BPU: NORMAL
BLD PROD TYP BPU: NORMAL
BLOOD UNIT EXPIRATION DATE: NORMAL
BLOOD UNIT EXPIRATION DATE: NORMAL
BLOOD UNIT TYPE CODE: 6200
BLOOD UNIT TYPE CODE: 6200
BLOOD UNIT TYPE: NORMAL
BLOOD UNIT TYPE: NORMAL
BUN SERPL-MCNC: 12 MG/DL (ref 8–23)
BUN SERPL-MCNC: 61 MG/DL (ref 8–23)
BUN SERPL-MCNC: 61 MG/DL (ref 8–23)
BUPRENORPHINE UR QL: NOT DETECTED
BZE UR QL SCN: NEGATIVE
BZE UR QL: NOT DETECTED
CALCIUM SERPL-MCNC: 9.1 MG/DL (ref 8.7–10.5)
CALCIUM SERPL-MCNC: 9.5 MG/DL (ref 8.7–10.5)
CALCIUM SERPL-MCNC: 9.6 MG/DL (ref 8.7–10.5)
CANNABINOIDS UR QL SCN: NEGATIVE
CARBOXYTHC UR QL: NOT DETECTED
CARISOPRODOL UR QL: NOT DETECTED
CHLORIDE SERPL-SCNC: 100 MMOL/L (ref 95–110)
CHLORIDE SERPL-SCNC: 94 MMOL/L (ref 95–110)
CHLORIDE SERPL-SCNC: 94 MMOL/L (ref 95–110)
CHOLEST SERPL-MCNC: 207 MG/DL (ref 120–199)
CHOLEST/HDLC SERPL: 4.7 {RATIO} (ref 2–5)
CLARITY UR: CLEAR
CLONAZEPAM UR QL: NOT DETECTED
CO2 SERPL-SCNC: 16 MMOL/L (ref 23–29)
CO2 SERPL-SCNC: 16 MMOL/L (ref 23–29)
CO2 SERPL-SCNC: 25 MMOL/L (ref 23–29)
CODEINE UR QL: NOT DETECTED
CODING SYSTEM: NORMAL
CODING SYSTEM: NORMAL
COLOR UR: YELLOW
CREAT SERPL-MCNC: 1.3 MG/DL (ref 0.5–1.4)
CREAT SERPL-MCNC: 2.9 MG/DL (ref 0.5–1.4)
CREAT SERPL-MCNC: 2.9 MG/DL (ref 0.5–1.4)
CREAT UR-MCNC: 131.4 MG/DL (ref 15–325)
CREAT UR-MCNC: 47 MG/DL (ref 15–325)
CREAT UR-MCNC: 51.3 MG/DL (ref 20–400)
CTP QC/QA: YES
DELSYS: ABNORMAL
DIAZEPAM UR QL: NOT DETECTED
DIFFERENTIAL METHOD: ABNORMAL
DISPENSE STATUS: NORMAL
DISPENSE STATUS: NORMAL
EOSINOPHIL # BLD AUTO: 0.2 K/UL (ref 0–0.5)
EOSINOPHIL # BLD AUTO: ABNORMAL K/UL (ref 0–0.5)
EOSINOPHIL NFR BLD: 0 % (ref 0–8)
EOSINOPHIL NFR BLD: 3 % (ref 0–8)
ERYTHROCYTE [DISTWIDTH] IN BLOOD BY AUTOMATED COUNT: 14.4 % (ref 11.5–14.5)
ERYTHROCYTE [DISTWIDTH] IN BLOOD BY AUTOMATED COUNT: 14.6 % (ref 11.5–14.5)
ERYTHROCYTE [DISTWIDTH] IN BLOOD BY AUTOMATED COUNT: 14.8 % (ref 11.5–14.5)
ERYTHROCYTE [DISTWIDTH] IN BLOOD BY AUTOMATED COUNT: 15.7 % (ref 11.5–14.5)
EST. GFR  (AFRICAN AMERICAN): 16.6 ML/MIN/1.73 M^2
EST. GFR  (AFRICAN AMERICAN): 16.6 ML/MIN/1.73 M^2
EST. GFR  (NO RACE VARIABLE): 40.8 ML/MIN/1.73 M^2
EST. GFR  (NON AFRICAN AMERICAN): 14.4 ML/MIN/1.73 M^2
EST. GFR  (NON AFRICAN AMERICAN): 14.4 ML/MIN/1.73 M^2
ESTIMATED AVG GLUCOSE: 160 MG/DL (ref 68–131)
ETHANOL SERPL-MCNC: <10 MG/DL (ref 0–10)
ETHYL GLUCURONIDE UR QL: NOT DETECTED
FENTANYL UR QL: NOT DETECTED
FIO2: 21
GABAPENTIN: NOT DETECTED
GLUCOSE SERPL-MCNC: 249 MG/DL (ref 70–110)
GLUCOSE SERPL-MCNC: 253 MG/DL (ref 70–110)
GLUCOSE SERPL-MCNC: 256 MG/DL (ref 70–110)
GLUCOSE UR QL STRIP: NEGATIVE
HBA1C MFR BLD: 7.2 % (ref 4–5.6)
HCO3 UR-SCNC: 19.8 MMOL/L (ref 24–28)
HCT VFR BLD AUTO: 20.3 % (ref 37–48.5)
HCT VFR BLD AUTO: 24.8 % (ref 37–48.5)
HCT VFR BLD AUTO: 27.4 % (ref 37–48.5)
HCT VFR BLD AUTO: 29.3 % (ref 37–48.5)
HDLC SERPL-MCNC: 44 MG/DL (ref 40–75)
HDLC SERPL: 21.3 % (ref 20–50)
HGB BLD-MCNC: 6.7 G/DL (ref 12–16)
HGB BLD-MCNC: 8.3 G/DL (ref 12–16)
HGB BLD-MCNC: 8.8 G/DL (ref 12–16)
HGB BLD-MCNC: 9.4 G/DL (ref 12–16)
HGB UR QL STRIP: ABNORMAL
HYALINE CASTS #/AREA URNS LPF: 0 /LPF
HYDROCODONE UR QL: NOT DETECTED
HYDROMORPHONE UR QL: NOT DETECTED
IMM GRANULOCYTES # BLD AUTO: 0.02 K/UL (ref 0–0.04)
IMM GRANULOCYTES # BLD AUTO: ABNORMAL K/UL
IMM GRANULOCYTES # BLD AUTO: ABNORMAL K/UL (ref 0–0.04)
IMM GRANULOCYTES # BLD AUTO: ABNORMAL K/UL (ref 0–0.04)
IMM GRANULOCYTES NFR BLD AUTO: 0.2 % (ref 0–0.5)
IMM GRANULOCYTES NFR BLD AUTO: ABNORMAL %
IMM GRANULOCYTES NFR BLD AUTO: ABNORMAL % (ref 0–0.5)
IMM GRANULOCYTES NFR BLD AUTO: ABNORMAL % (ref 0–0.5)
KETONES UR QL STRIP: ABNORMAL
LACTATE SERPL-SCNC: 1.2 MMOL/L (ref 0.5–2.2)
LACTATE SERPL-SCNC: 2.2 MMOL/L (ref 0.5–2.2)
LDLC SERPL CALC-MCNC: 122.2 MG/DL (ref 63–159)
LEUKOCYTE ESTERASE UR QL STRIP: ABNORMAL
LORAZEPAM UR QL: NOT DETECTED
LYMPHOCYTES # BLD AUTO: 2.5 K/UL (ref 1–4.8)
LYMPHOCYTES # BLD AUTO: ABNORMAL K/UL (ref 1–4.8)
LYMPHOCYTES NFR BLD: 30.6 % (ref 18–48)
LYMPHOCYTES NFR BLD: 4 % (ref 18–48)
LYMPHOCYTES NFR BLD: 4 % (ref 18–48)
LYMPHOCYTES NFR BLD: 6 % (ref 18–48)
MCH RBC QN AUTO: 28.9 PG (ref 27–31)
MCH RBC QN AUTO: 29.5 PG (ref 27–31)
MCH RBC QN AUTO: 29.5 PG (ref 27–31)
MCH RBC QN AUTO: 29.6 PG (ref 27–31)
MCHC RBC AUTO-ENTMCNC: 32.1 G/DL (ref 32–36)
MCHC RBC AUTO-ENTMCNC: 32.1 G/DL (ref 32–36)
MCHC RBC AUTO-ENTMCNC: 33 G/DL (ref 32–36)
MCHC RBC AUTO-ENTMCNC: 33.5 G/DL (ref 32–36)
MCV RBC AUTO: 89 FL (ref 82–98)
MCV RBC AUTO: 89 FL (ref 82–98)
MCV RBC AUTO: 90 FL (ref 82–98)
MCV RBC AUTO: 92 FL (ref 82–98)
MDA UR QL: NOT DETECTED
MDEA UR QL: NOT DETECTED
MDMA UR QL: NOT DETECTED
ME-PHENIDATE UR QL: NOT DETECTED
METHADONE UR QL SCN>300 NG/ML: NEGATIVE
METHADONE UR QL: NOT DETECTED
METHAMPHET UR QL: NOT DETECTED
MICROALBUMIN UR DL<=1MG/L-MCNC: 109 UG/ML
MICROSCOPIC COMMENT: ABNORMAL
MIDAZOLAM UR QL SCN: NOT DETECTED
MODE: ABNORMAL
MONOCYTES # BLD AUTO: 0.7 K/UL (ref 0.3–1)
MONOCYTES # BLD AUTO: ABNORMAL K/UL (ref 0.3–1)
MONOCYTES NFR BLD: 0 % (ref 4–15)
MONOCYTES NFR BLD: 1 % (ref 4–15)
MONOCYTES NFR BLD: 3 % (ref 4–15)
MONOCYTES NFR BLD: 8.4 % (ref 4–15)
MORPHINE UR QL: NOT DETECTED
MYELOCYTES NFR BLD MANUAL: 1 %
NALOXONE: NOT DETECTED
NEUTROPHILS # BLD AUTO: 4.6 K/UL (ref 1.8–7.7)
NEUTROPHILS NFR BLD: 57.1 % (ref 38–73)
NEUTROPHILS NFR BLD: 81 % (ref 38–73)
NEUTROPHILS NFR BLD: 85 % (ref 38–73)
NEUTROPHILS NFR BLD: 87 % (ref 38–73)
NEUTS BAND NFR BLD MANUAL: 15 %
NEUTS BAND NFR BLD MANUAL: 5 %
NEUTS BAND NFR BLD MANUAL: 8 %
NITRITE UR QL STRIP: NEGATIVE
NONHDLC SERPL-MCNC: 163 MG/DL
NORBUPRENORPHINE UR QL CFM: NOT DETECTED
NORDIAZEPAM UR QL: NOT DETECTED
NORFENTANYL UR QL: NOT DETECTED
NORHYDROCODONE UR QL CFM: NOT DETECTED
NORMEPERIDINE UR QL CFM: NOT DETECTED
NOROXYCODONE UR QL CFM: NOT DETECTED
NOROXYMORPHONE UR QL SCN: NOT DETECTED
NRBC BLD-RTO: 0 /100 WBC
NUM UNITS TRANS PACKED RBC: NORMAL
NUM UNITS TRANS PACKED RBC: NORMAL
OB PNL STL: POSITIVE
OPIATES UR QL SCN: NEGATIVE
OXAZEPAM UR QL: NOT DETECTED
OXYCODONE UR QL: NOT DETECTED
OXYMORPHONE UR QL: NOT DETECTED
PATHOLOGY STUDY: NORMAL
PCO2 BLDA: 22.6 MMHG (ref 35–45)
PCP UR QL SCN>25 NG/ML: NEGATIVE
PCP UR QL: NOT DETECTED
PH SMN: 7.55 [PH] (ref 7.35–7.45)
PH UR STRIP: 6 [PH] (ref 5–8)
PHENTERMINE UR QL: NOT DETECTED
PLATELET # BLD AUTO: 352 K/UL (ref 150–450)
PLATELET # BLD AUTO: 371 K/UL (ref 150–450)
PLATELET # BLD AUTO: 394 K/UL (ref 150–450)
PLATELET # BLD AUTO: 567 K/UL (ref 150–450)
PMV BLD AUTO: 8.7 FL (ref 9.2–12.9)
PMV BLD AUTO: 8.7 FL (ref 9.2–12.9)
PMV BLD AUTO: 8.9 FL (ref 9.2–12.9)
PMV BLD AUTO: 9.1 FL (ref 9.2–12.9)
PO2 BLDA: 132 MMHG (ref 80–100)
POC BE: -3 MMOL/L
POC SATURATED O2: 99 % (ref 95–100)
POC TCO2: 20 MMOL/L (ref 23–27)
POCT GLUCOSE: 274 MG/DL (ref 70–110)
POTASSIUM SERPL-SCNC: 3.8 MMOL/L (ref 3.5–5.1)
POTASSIUM SERPL-SCNC: 5 MMOL/L (ref 3.5–5.1)
POTASSIUM SERPL-SCNC: 5 MMOL/L (ref 3.5–5.1)
PREGABALIN: NOT DETECTED
PROT SERPL-MCNC: 7.4 G/DL (ref 6–8.4)
PROT SERPL-MCNC: 7.5 G/DL (ref 6–8.4)
PROT SERPL-MCNC: 7.8 G/DL (ref 6–8.4)
PROT UR QL STRIP: ABNORMAL
RBC # BLD AUTO: 2.27 M/UL (ref 4–5.4)
RBC # BLD AUTO: 2.8 M/UL (ref 4–5.4)
RBC # BLD AUTO: 3.05 M/UL (ref 4–5.4)
RBC # BLD AUTO: 3.19 M/UL (ref 4–5.4)
RBC #/AREA URNS HPF: 40 /HPF (ref 0–4)
SALICYLATES SERPL-MCNC: <5 MG/DL (ref 15–30)
SAMPLE: ABNORMAL
SARS-COV-2 AG RESP QL IA.RAPID: NEGATIVE
SARS-COV-2 RDRP RESP QL NAA+PROBE: NEGATIVE
SERVICE CMNT-IMP: NORMAL
SITE: ABNORMAL
SODIUM SERPL-SCNC: 129 MMOL/L (ref 136–145)
SODIUM SERPL-SCNC: 129 MMOL/L (ref 136–145)
SODIUM SERPL-SCNC: 138 MMOL/L (ref 136–145)
SP GR UR STRIP: 1.02 (ref 1–1.03)
SP02: 97
T4 FREE SERPL-MCNC: 1.23 NG/DL (ref 0.71–1.51)
TAPENTADOL UR QL SCN: NOT DETECTED
TAPENTADOL UR QL SCN: NOT DETECTED
TEMAZEPAM UR QL: NOT DETECTED
TOXICOLOGY INFORMATION: ABNORMAL
TRAMADOL UR QL: NOT DETECTED
TRIGL SERPL-MCNC: 204 MG/DL (ref 30–150)
TSH SERPL DL<=0.005 MIU/L-ACNC: 0.69 UIU/ML (ref 0.4–4)
URN SPEC COLLECT METH UR: ABNORMAL
UROBILINOGEN UR STRIP-ACNC: NEGATIVE EU/DL
WBC # BLD AUTO: 29.32 K/UL (ref 3.9–12.7)
WBC # BLD AUTO: 32.57 K/UL (ref 3.9–12.7)
WBC # BLD AUTO: 48.72 K/UL (ref 3.9–12.7)
WBC # BLD AUTO: 8.13 K/UL (ref 3.9–12.7)
WBC #/AREA URNS HPF: >100 /HPF (ref 0–5)
ZOLPIDEM METABOLITE: NOT DETECTED
ZOLPIDEM UR QL: NOT DETECTED

## 2022-01-01 PROCEDURE — 82803 BLOOD GASES ANY COMBINATION: CPT

## 2022-01-01 PROCEDURE — 87088 URINE BACTERIA CULTURE: CPT | Performed by: NURSE PRACTITIONER

## 2022-01-01 PROCEDURE — 87040 BLOOD CULTURE FOR BACTERIA: CPT | Performed by: FAMILY MEDICINE

## 2022-01-01 PROCEDURE — 71250 CT THORAX DX C-: CPT | Mod: 26,,, | Performed by: RADIOLOGY

## 2022-01-01 PROCEDURE — 99215 PR OFFICE/OUTPT VISIT, EST, LEVL V, 40-54 MIN: ICD-10-PCS | Mod: S$PBB,,, | Performed by: NURSE PRACTITIONER

## 2022-01-01 PROCEDURE — 85025 COMPLETE CBC W/AUTO DIFF WBC: CPT | Performed by: NURSE PRACTITIONER

## 2022-01-01 PROCEDURE — 73502 XR HIP WITH PELVIS WHEN PERFORMED, 2 OR 3 VIEWS LEFT: ICD-10-PCS | Mod: 26,LT,, | Performed by: RADIOLOGY

## 2022-01-01 PROCEDURE — 87077 CULTURE AEROBIC IDENTIFY: CPT | Performed by: NURSE PRACTITIONER

## 2022-01-01 PROCEDURE — G0179 PR HOME HEALTH MD RECERTIFICATION: ICD-10-PCS | Mod: ,,, | Performed by: NURSE PRACTITIONER

## 2022-01-01 PROCEDURE — 84439 ASSAY OF FREE THYROXINE: CPT | Performed by: NURSE PRACTITIONER

## 2022-01-01 PROCEDURE — 86850 RBC ANTIBODY SCREEN: CPT | Performed by: FAMILY MEDICINE

## 2022-01-01 PROCEDURE — 27096 INJECT SACROILIAC JOINT: CPT | Mod: 50 | Performed by: ANESTHESIOLOGY

## 2022-01-01 PROCEDURE — 99291 CRITICAL CARE FIRST HOUR: CPT | Mod: 25

## 2022-01-01 PROCEDURE — 82043 UR ALBUMIN QUANTITATIVE: CPT | Performed by: NURSE PRACTITIONER

## 2022-01-01 PROCEDURE — 70450 CT HEAD WITHOUT CONTRAST: ICD-10-PCS | Mod: 26,,, | Performed by: RADIOLOGY

## 2022-01-01 PROCEDURE — 87077 CULTURE AEROBIC IDENTIFY: CPT | Mod: 59 | Performed by: NURSE PRACTITIONER

## 2022-01-01 PROCEDURE — 80061 LIPID PANEL: CPT | Performed by: NURSE PRACTITIONER

## 2022-01-01 PROCEDURE — 80053 COMPREHEN METABOLIC PANEL: CPT | Performed by: FAMILY MEDICINE

## 2022-01-01 PROCEDURE — 64494 INJ PARAVERT F JNT L/S 2 LEV: CPT | Mod: 50 | Performed by: ANESTHESIOLOGY

## 2022-01-01 PROCEDURE — 64445 NJX AA&/STRD SCIATIC NRV IMG: CPT | Mod: 50,,, | Performed by: ANESTHESIOLOGY

## 2022-01-01 PROCEDURE — 93010 ELECTROCARDIOGRAM REPORT: CPT | Mod: ,,, | Performed by: INTERNAL MEDICINE

## 2022-01-01 PROCEDURE — 99213 PR OFFICE/OUTPT VISIT, EST, LEVL III, 20-29 MIN: ICD-10-PCS | Mod: S$PBB,,, | Performed by: FAMILY MEDICINE

## 2022-01-01 PROCEDURE — 71250 CT THORAX DX C-: CPT | Mod: TC

## 2022-01-01 PROCEDURE — 73502 X-RAY EXAM HIP UNI 2-3 VIEWS: CPT | Mod: 26,LT,, | Performed by: RADIOLOGY

## 2022-01-01 PROCEDURE — 25000003 PHARM REV CODE 250: Performed by: ANESTHESIOLOGY

## 2022-01-01 PROCEDURE — 80326 AMPHETAMINES 5 OR MORE: CPT | Performed by: NURSE PRACTITIONER

## 2022-01-01 PROCEDURE — 82272 OCCULT BLD FECES 1-3 TESTS: CPT | Performed by: FAMILY MEDICINE

## 2022-01-01 PROCEDURE — 99999 PR PBB SHADOW E&M-EST. PATIENT-LVL IV: CPT | Mod: PBBFAC,,, | Performed by: NURSE PRACTITIONER

## 2022-01-01 PROCEDURE — 36600 WITHDRAWAL OF ARTERIAL BLOOD: CPT

## 2022-01-01 PROCEDURE — 99213 PR OFFICE/OUTPT VISIT, EST, LEVL III, 20-29 MIN: ICD-10-PCS | Mod: S$PBB,,, | Performed by: NURSE PRACTITIONER

## 2022-01-01 PROCEDURE — 99999 PR PBB SHADOW E&M-EST. PATIENT-LVL V: CPT | Mod: PBBFAC,,, | Performed by: FAMILY MEDICINE

## 2022-01-01 PROCEDURE — 71250 CT CHEST WITHOUT CONTRAST: ICD-10-PCS | Mod: 26,,, | Performed by: RADIOLOGY

## 2022-01-01 PROCEDURE — 82570 ASSAY OF URINE CREATININE: CPT | Performed by: NURSE PRACTITIONER

## 2022-01-01 PROCEDURE — 70450 CT HEAD/BRAIN W/O DYE: CPT | Mod: 26,,, | Performed by: RADIOLOGY

## 2022-01-01 PROCEDURE — 99215 OFFICE O/P EST HI 40 MIN: CPT | Mod: PBBFAC | Performed by: FAMILY MEDICINE

## 2022-01-01 PROCEDURE — G0179 MD RECERTIFICATION HHA PT: HCPCS | Mod: ,,, | Performed by: NURSE PRACTITIONER

## 2022-01-01 PROCEDURE — 70450 CT HEAD/BRAIN W/O DYE: CPT | Mod: TC

## 2022-01-01 PROCEDURE — 99215 OFFICE O/P EST HI 40 MIN: CPT | Mod: S$PBB,,, | Performed by: NURSE PRACTITIONER

## 2022-01-01 PROCEDURE — 99215 OFFICE O/P EST HI 40 MIN: CPT | Mod: PBBFAC,PN | Performed by: PODIATRIST

## 2022-01-01 PROCEDURE — 99214 OFFICE O/P EST MOD 30 MIN: CPT | Mod: S$PBB,,, | Performed by: NURSE PRACTITIONER

## 2022-01-01 PROCEDURE — 99213 OFFICE O/P EST LOW 20 MIN: CPT | Mod: S$PBB,,, | Performed by: PODIATRIST

## 2022-01-01 PROCEDURE — 87186 SC STD MICRODIL/AGAR DIL: CPT | Performed by: NURSE PRACTITIONER

## 2022-01-01 PROCEDURE — 71045 X-RAY EXAM CHEST 1 VIEW: CPT | Mod: 26,,, | Performed by: RADIOLOGY

## 2022-01-01 PROCEDURE — 87086 URINE CULTURE/COLONY COUNT: CPT | Performed by: NURSE PRACTITIONER

## 2022-01-01 PROCEDURE — 99999 PR PBB SHADOW E&M-EST. PATIENT-LVL V: CPT | Mod: PBBFAC,,, | Performed by: NURSE PRACTITIONER

## 2022-01-01 PROCEDURE — 99292 CRITICAL CARE ADDL 30 MIN: CPT | Mod: 25

## 2022-01-01 PROCEDURE — 80179 DRUG ASSAY SALICYLATE: CPT | Performed by: FAMILY MEDICINE

## 2022-01-01 PROCEDURE — 99214 PR OFFICE/OUTPT VISIT, EST, LEVL IV, 30-39 MIN: ICD-10-PCS | Mod: S$PBB,,, | Performed by: NURSE PRACTITIONER

## 2022-01-01 PROCEDURE — 99900035 HC TECH TIME PER 15 MIN (STAT)

## 2022-01-01 PROCEDURE — 64451 NJX AA&/STRD NRV NRVTG SI JT: CPT | Mod: 50 | Performed by: ANESTHESIOLOGY

## 2022-01-01 PROCEDURE — P9016 RBC LEUKOCYTES REDUCED: HCPCS | Performed by: FAMILY MEDICINE

## 2022-01-01 PROCEDURE — 96361 HYDRATE IV INFUSION ADD-ON: CPT | Mod: 59

## 2022-01-01 PROCEDURE — 99999 PR PBB SHADOW E&M-EST. PATIENT-LVL V: ICD-10-PCS | Mod: PBBFAC,,, | Performed by: PODIATRIST

## 2022-01-01 PROCEDURE — 63600175 PHARM REV CODE 636 W HCPCS: Performed by: ANESTHESIOLOGY

## 2022-01-01 PROCEDURE — 82077 ASSAY SPEC XCP UR&BREATH IA: CPT | Performed by: FAMILY MEDICINE

## 2022-01-01 PROCEDURE — 73502 X-RAY EXAM HIP UNI 2-3 VIEWS: CPT | Mod: TC,FY,LT

## 2022-01-01 PROCEDURE — 85027 COMPLETE CBC AUTOMATED: CPT | Performed by: FAMILY MEDICINE

## 2022-01-01 PROCEDURE — 25000003 PHARM REV CODE 250: Performed by: FAMILY MEDICINE

## 2022-01-01 PROCEDURE — 64493 INJ PARAVERT F JNT L/S 1 LEV: CPT | Mod: 50 | Performed by: ANESTHESIOLOGY

## 2022-01-01 PROCEDURE — 99999 PR PBB SHADOW E&M-EST. PATIENT-LVL V: ICD-10-PCS | Mod: PBBFAC,,, | Performed by: NURSE PRACTITIONER

## 2022-01-01 PROCEDURE — 93005 ELECTROCARDIOGRAM TRACING: CPT

## 2022-01-01 PROCEDURE — 80143 DRUG ASSAY ACETAMINOPHEN: CPT | Performed by: FAMILY MEDICINE

## 2022-01-01 PROCEDURE — 36415 COLL VENOUS BLD VENIPUNCTURE: CPT | Performed by: FAMILY MEDICINE

## 2022-01-01 PROCEDURE — 64451 NJX AA&/STRD NRV NRVTG SI JT: CPT | Mod: 50

## 2022-01-01 PROCEDURE — 77002 NEEDLE LOCALIZATION BY XRAY: CPT | Performed by: ANESTHESIOLOGY

## 2022-01-01 PROCEDURE — 99999 PR PBB SHADOW E&M-EST. PATIENT-LVL V: CPT | Mod: PBBFAC,,, | Performed by: PODIATRIST

## 2022-01-01 PROCEDURE — 99215 OFFICE O/P EST HI 40 MIN: CPT | Mod: PBBFAC | Performed by: NURSE PRACTITIONER

## 2022-01-01 PROCEDURE — 81000 URINALYSIS NONAUTO W/SCOPE: CPT | Mod: 59 | Performed by: NURSE PRACTITIONER

## 2022-01-01 PROCEDURE — 99213 OFFICE O/P EST LOW 20 MIN: CPT | Mod: S$PBB,,, | Performed by: NURSE PRACTITIONER

## 2022-01-01 PROCEDURE — 80307 DRUG TEST PRSMV CHEM ANLYZR: CPT | Performed by: NURSE PRACTITIONER

## 2022-01-01 PROCEDURE — 99999 PR PBB SHADOW E&M-EST. PATIENT-LVL IV: ICD-10-PCS | Mod: PBBFAC,,, | Performed by: NURSE PRACTITIONER

## 2022-01-01 PROCEDURE — 86900 BLOOD TYPING SEROLOGIC ABO: CPT | Performed by: FAMILY MEDICINE

## 2022-01-01 PROCEDURE — 99215 OFFICE O/P EST HI 40 MIN: CPT | Mod: PBBFAC,PO | Performed by: ANESTHESIOLOGY

## 2022-01-01 PROCEDURE — 36415 COLL VENOUS BLD VENIPUNCTURE: CPT | Performed by: NURSE PRACTITIONER

## 2022-01-01 PROCEDURE — 93010 EKG 12-LEAD: ICD-10-PCS | Mod: ,,, | Performed by: INTERNAL MEDICINE

## 2022-01-01 PROCEDURE — 85027 COMPLETE CBC AUTOMATED: CPT | Mod: 91 | Performed by: NURSE PRACTITIONER

## 2022-01-01 PROCEDURE — 99213 OFFICE O/P EST LOW 20 MIN: CPT | Mod: S$PBB,,, | Performed by: FAMILY MEDICINE

## 2022-01-01 PROCEDURE — U0002 COVID-19 LAB TEST NON-CDC: HCPCS | Performed by: FAMILY MEDICINE

## 2022-01-01 PROCEDURE — 85007 BL SMEAR W/DIFF WBC COUNT: CPT | Mod: 91 | Performed by: NURSE PRACTITIONER

## 2022-01-01 PROCEDURE — 99999 PR PBB SHADOW E&M-EST. PATIENT-LVL V: CPT | Mod: PBBFAC,,, | Performed by: ANESTHESIOLOGY

## 2022-01-01 PROCEDURE — 83036 HEMOGLOBIN GLYCOSYLATED A1C: CPT | Performed by: NURSE PRACTITIONER

## 2022-01-01 PROCEDURE — 85007 BL SMEAR W/DIFF WBC COUNT: CPT | Performed by: FAMILY MEDICINE

## 2022-01-01 PROCEDURE — 83605 ASSAY OF LACTIC ACID: CPT | Performed by: FAMILY MEDICINE

## 2022-01-01 PROCEDURE — 71045 X-RAY EXAM CHEST 1 VIEW: CPT | Mod: TC,FY

## 2022-01-01 PROCEDURE — 96374 THER/PROPH/DIAG INJ IV PUSH: CPT | Mod: 59

## 2022-01-01 PROCEDURE — 64445 PR NERVE BLOCK INJ, ANES/STEROID, SCIATIC, INCL IMAG GUIDANCE: ICD-10-PCS | Mod: 50,,, | Performed by: ANESTHESIOLOGY

## 2022-01-01 PROCEDURE — 63600175 PHARM REV CODE 636 W HCPCS: Performed by: FAMILY MEDICINE

## 2022-01-01 PROCEDURE — 71045 XR CHEST AP PORTABLE: ICD-10-PCS | Mod: 26,,, | Performed by: RADIOLOGY

## 2022-01-01 PROCEDURE — 99999 PR PBB SHADOW E&M-EST. PATIENT-LVL V: ICD-10-PCS | Mod: PBBFAC,,, | Performed by: ANESTHESIOLOGY

## 2022-01-01 PROCEDURE — 99999 PR PBB SHADOW E&M-EST. PATIENT-LVL V: ICD-10-PCS | Mod: PBBFAC,,, | Performed by: FAMILY MEDICINE

## 2022-01-01 PROCEDURE — 99214 OFFICE O/P EST MOD 30 MIN: CPT | Mod: PBBFAC,25 | Performed by: NURSE PRACTITIONER

## 2022-01-01 PROCEDURE — 99213 PR OFFICE/OUTPT VISIT, EST, LEVL III, 20-29 MIN: ICD-10-PCS | Mod: S$PBB,,, | Performed by: PODIATRIST

## 2022-01-01 PROCEDURE — 86920 COMPATIBILITY TEST SPIN: CPT | Performed by: FAMILY MEDICINE

## 2022-01-01 PROCEDURE — 36430 TRANSFUSION BLD/BLD COMPNT: CPT

## 2022-01-01 PROCEDURE — 80053 COMPREHEN METABOLIC PANEL: CPT | Performed by: NURSE PRACTITIONER

## 2022-01-01 PROCEDURE — 80053 COMPREHEN METABOLIC PANEL: CPT | Mod: 91 | Performed by: NURSE PRACTITIONER

## 2022-01-01 PROCEDURE — 82962 GLUCOSE BLOOD TEST: CPT

## 2022-01-01 PROCEDURE — 99214 OFFICE O/P EST MOD 30 MIN: CPT | Mod: S$PBB,,, | Performed by: ANESTHESIOLOGY

## 2022-01-01 PROCEDURE — 84443 ASSAY THYROID STIM HORMONE: CPT | Performed by: NURSE PRACTITIONER

## 2022-01-01 PROCEDURE — 99214 PR OFFICE/OUTPT VISIT, EST, LEVL IV, 30-39 MIN: ICD-10-PCS | Mod: S$PBB,,, | Performed by: ANESTHESIOLOGY

## 2022-01-01 RX ORDER — ALPRAZOLAM 0.25 MG/1
0.25 TABLET ORAL 2 TIMES DAILY PRN
Qty: 60 TABLET | Refills: 2 | Status: SHIPPED | OUTPATIENT
Start: 2022-01-01

## 2022-01-01 RX ORDER — OLANZAPINE 2.5 MG/1
TABLET ORAL
Qty: 30 TABLET | Refills: 5 | OUTPATIENT
Start: 2022-01-01

## 2022-01-01 RX ORDER — PANTOPRAZOLE SODIUM 40 MG/1
40 TABLET, DELAYED RELEASE ORAL DAILY
COMMUNITY
Start: 2022-01-01

## 2022-01-01 RX ORDER — INSULIN PUMP SYRINGE, 3 ML
EACH MISCELLANEOUS
Qty: 1 EACH | Refills: 3 | Status: SHIPPED | OUTPATIENT
Start: 2022-01-01 | End: 2023-05-23

## 2022-01-01 RX ORDER — SODIUM CHLORIDE, SODIUM LACTATE, POTASSIUM CHLORIDE, CALCIUM CHLORIDE 600; 310; 30; 20 MG/100ML; MG/100ML; MG/100ML; MG/100ML
INJECTION, SOLUTION INTRAVENOUS ONCE AS NEEDED
Status: ACTIVE | OUTPATIENT
Start: 2022-01-01 | End: 2034-04-28

## 2022-01-01 RX ORDER — NITROFURANTOIN MACROCRYSTALS 50 MG/1
CAPSULE ORAL
Qty: 90 CAPSULE | Refills: 1 | Status: SHIPPED | OUTPATIENT
Start: 2022-01-01

## 2022-01-01 RX ORDER — SODIUM CHLORIDE 9 MG/ML
INJECTION, SOLUTION INTRAVENOUS CONTINUOUS
Status: DISCONTINUED | OUTPATIENT
Start: 2022-01-01 | End: 2022-01-01 | Stop reason: HOSPADM

## 2022-01-01 RX ORDER — AMIODARONE HYDROCHLORIDE 200 MG/1
200 TABLET ORAL DAILY
COMMUNITY

## 2022-01-01 RX ORDER — SODIUM BICARBONATE 650 MG/1
1300 TABLET ORAL 3 TIMES DAILY
Qty: 90 TABLET | Refills: 1 | Status: SHIPPED | OUTPATIENT
Start: 2022-01-01

## 2022-01-01 RX ORDER — LIDOCAINE HYDROCHLORIDE 10 MG/ML
INJECTION INFILTRATION; PERINEURAL
Status: DISCONTINUED | OUTPATIENT
Start: 2022-01-01 | End: 2022-01-01 | Stop reason: HOSPADM

## 2022-01-01 RX ORDER — PIPERACILLIN SODIUM, TAZOBACTAM SODIUM 3; .375 G/15ML; G/15ML
3.38 INJECTION, POWDER, LYOPHILIZED, FOR SOLUTION INTRAVENOUS
Status: COMPLETED | OUTPATIENT
Start: 2022-01-01 | End: 2022-01-01

## 2022-01-01 RX ORDER — HYDROCODONE BITARTRATE AND ACETAMINOPHEN 500; 5 MG/1; MG/1
TABLET ORAL
Status: DISCONTINUED | OUTPATIENT
Start: 2022-01-01 | End: 2022-01-01 | Stop reason: HOSPADM

## 2022-01-01 RX ORDER — DILTIAZEM HYDROCHLORIDE 240 MG/1
240 CAPSULE, COATED, EXTENDED RELEASE ORAL DAILY
Qty: 90 CAPSULE | Refills: 1 | Status: SHIPPED | OUTPATIENT
Start: 2022-01-01

## 2022-01-01 RX ORDER — INSULIN ASPART 100 [IU]/ML
INJECTION, SOLUTION INTRAVENOUS; SUBCUTANEOUS
Qty: 4 EACH | Refills: 5 | Status: SHIPPED | OUTPATIENT
Start: 2022-01-01

## 2022-01-01 RX ORDER — LEVOTHYROXINE SODIUM 88 UG/1
88 TABLET ORAL
Qty: 90 TABLET | Refills: 0 | Status: SHIPPED | OUTPATIENT
Start: 2022-01-01 | End: 2022-01-01

## 2022-01-01 RX ORDER — SODIUM CHLORIDE, SODIUM LACTATE, POTASSIUM CHLORIDE, CALCIUM CHLORIDE 600; 310; 30; 20 MG/100ML; MG/100ML; MG/100ML; MG/100ML
INJECTION, SOLUTION INTRAVENOUS ONCE AS NEEDED
Status: CANCELLED | OUTPATIENT
Start: 2022-01-01 | End: 2034-02-08

## 2022-01-01 RX ORDER — NYSTATIN 100000 U/G
CREAM TOPICAL 2 TIMES DAILY
Qty: 30 G | Refills: 1 | Status: SHIPPED | OUTPATIENT
Start: 2022-01-01

## 2022-01-01 RX ORDER — DILTIAZEM HYDROCHLORIDE EXTENDED-RELEASE TABLETS 180 MG/1
180 TABLET, EXTENDED RELEASE ORAL DAILY
Qty: 90 TABLET | Refills: 1 | Status: SHIPPED | OUTPATIENT
Start: 2022-01-01 | End: 2022-01-01

## 2022-01-01 RX ORDER — LEVOTHYROXINE SODIUM 88 UG/1
TABLET ORAL
Qty: 30 TABLET | Refills: 0 | Status: SHIPPED | OUTPATIENT
Start: 2022-01-01

## 2022-01-01 RX ORDER — MIDAZOLAM HYDROCHLORIDE 1 MG/ML
INJECTION, SOLUTION INTRAMUSCULAR; INTRAVENOUS
Status: DISCONTINUED | OUTPATIENT
Start: 2022-01-01 | End: 2022-01-01 | Stop reason: HOSPADM

## 2022-01-01 RX ORDER — AMOXICILLIN AND CLAVULANATE POTASSIUM 500; 125 MG/1; MG/1
1 TABLET, FILM COATED ORAL 2 TIMES DAILY
Qty: 20 TABLET | Refills: 0 | Status: SHIPPED | OUTPATIENT
Start: 2022-01-01 | End: 2022-01-01

## 2022-01-01 RX ORDER — CALCIUM CARB/MAGNESIUM CARB 311-232MG
5 TABLET ORAL
COMMUNITY
End: 2022-01-01

## 2022-01-01 RX ORDER — FLUCONAZOLE 200 MG/1
400 TABLET ORAL DAILY
Qty: 28 TABLET | Refills: 0 | Status: SHIPPED | OUTPATIENT
Start: 2022-01-01 | End: 2022-01-01

## 2022-01-01 RX ORDER — OLANZAPINE 5 MG/1
5 TABLET ORAL NIGHTLY
COMMUNITY
End: 2022-01-01 | Stop reason: SDUPTHER

## 2022-01-01 RX ORDER — DULAGLUTIDE 1.5 MG/.5ML
0.5 INJECTION, SOLUTION SUBCUTANEOUS
COMMUNITY
Start: 2022-01-01 | End: 2022-01-01

## 2022-01-01 RX ORDER — CARVEDILOL 3.12 MG/1
3.12 TABLET ORAL 2 TIMES DAILY WITH MEALS
Qty: 180 TABLET | Refills: 0 | Status: SHIPPED | OUTPATIENT
Start: 2022-01-01

## 2022-01-01 RX ORDER — BUPIVACAINE HYDROCHLORIDE 5 MG/ML
INJECTION, SOLUTION EPIDURAL; INTRACAUDAL
Status: DISCONTINUED | OUTPATIENT
Start: 2022-01-01 | End: 2022-01-01 | Stop reason: HOSPADM

## 2022-01-01 RX ORDER — SODIUM BICARBONATE 650 MG/1
1300 TABLET ORAL 3 TIMES DAILY
COMMUNITY
Start: 2022-01-01 | End: 2022-01-01 | Stop reason: SDUPTHER

## 2022-01-01 RX ORDER — NITROFURANTOIN MACROCRYSTALS 50 MG/1
50 CAPSULE ORAL NIGHTLY
Qty: 90 CAPSULE | Refills: 1
Start: 2022-01-01 | End: 2022-01-01

## 2022-01-01 RX ORDER — SODIUM CHLORIDE, SODIUM LACTATE, POTASSIUM CHLORIDE, CALCIUM CHLORIDE 600; 310; 30; 20 MG/100ML; MG/100ML; MG/100ML; MG/100ML
INJECTION, SOLUTION INTRAVENOUS ONCE AS NEEDED
Status: DISCONTINUED | OUTPATIENT
Start: 2022-01-01 | End: 2022-01-01 | Stop reason: HOSPADM

## 2022-01-01 RX ORDER — FLASH GLUCOSE SCANNING READER
1 EACH MISCELLANEOUS
Qty: 2 EACH | Refills: 5 | Status: SHIPPED | OUTPATIENT
Start: 2022-01-01

## 2022-01-01 RX ORDER — PAROXETINE 10 MG/1
10 TABLET, FILM COATED ORAL NIGHTLY
Qty: 90 TABLET | Refills: 1 | Status: SHIPPED | OUTPATIENT
Start: 2022-01-01 | End: 2023-01-01

## 2022-01-01 RX ORDER — FLUCONAZOLE 150 MG/1
150 TABLET ORAL DAILY
Qty: 2 TABLET | Refills: 0 | Status: SHIPPED | OUTPATIENT
Start: 2022-01-01 | End: 2022-01-01

## 2022-01-01 RX ORDER — OLANZAPINE 5 MG/1
5 TABLET ORAL NIGHTLY
Qty: 90 TABLET | Refills: 1 | Status: SHIPPED | OUTPATIENT
Start: 2022-01-01

## 2022-01-01 RX ORDER — FLASH GLUCOSE SENSOR
KIT MISCELLANEOUS
COMMUNITY
Start: 2022-01-01

## 2022-01-01 RX ORDER — DILTIAZEM HYDROCHLORIDE 240 MG/1
240 CAPSULE, COATED, EXTENDED RELEASE ORAL DAILY
COMMUNITY
End: 2022-01-01 | Stop reason: SDUPTHER

## 2022-01-01 RX ADMIN — SODIUM CHLORIDE: 0.9 INJECTION, SOLUTION INTRAVENOUS at 02:06

## 2022-01-01 RX ADMIN — SODIUM CHLORIDE: 0.9 INJECTION, SOLUTION INTRAVENOUS at 01:06

## 2022-01-01 RX ADMIN — PIPERACILLIN AND TAZOBACTAM 3.38 G: 3; .375 INJECTION, POWDER, LYOPHILIZED, FOR SOLUTION INTRAVENOUS; PARENTERAL at 07:06

## 2022-01-01 RX ADMIN — SODIUM CHLORIDE 500 ML: 0.9 INJECTION, SOLUTION INTRAVENOUS at 12:06

## 2022-01-01 RX ADMIN — SODIUM CHLORIDE, SODIUM LACTATE, POTASSIUM CHLORIDE, AND CALCIUM CHLORIDE 1185 ML: .6; .31; .03; .02 INJECTION, SOLUTION INTRAVENOUS at 06:06

## 2022-01-03 ENCOUNTER — DOCUMENT SCAN (OUTPATIENT)
Dept: HOME HEALTH SERVICES | Facility: HOSPITAL | Age: 82
End: 2022-01-03
Payer: MEDICARE

## 2022-01-03 RX ORDER — AMOXICILLIN AND CLAVULANATE POTASSIUM 875; 125 MG/1; MG/1
1 TABLET, FILM COATED ORAL 2 TIMES DAILY
Qty: 14 TABLET | Refills: 0 | Status: SHIPPED | OUTPATIENT
Start: 2022-01-03 | End: 2022-01-21

## 2022-01-03 NOTE — TELEPHONE ENCOUNTER
Antibiotics not received/sent can you send to     [id number] [place] [address]     The pharmacy has been changed on the chart.

## 2022-01-09 ENCOUNTER — PATIENT MESSAGE (OUTPATIENT)
Dept: FAMILY MEDICINE | Facility: CLINIC | Age: 82
End: 2022-01-09
Payer: MEDICARE

## 2022-01-11 NOTE — TELEPHONE ENCOUNTER
Can a nurse call the son and not use the portal to get a better picture of what he is talking about since all of the Sp02 values are normal and I don't understand the message either.

## 2022-01-13 ENCOUNTER — TELEPHONE (OUTPATIENT)
Dept: FAMILY MEDICINE | Facility: CLINIC | Age: 82
End: 2022-01-13
Payer: MEDICARE

## 2022-01-13 DIAGNOSIS — R39.9 UTI SYMPTOMS: Primary | ICD-10-CM

## 2022-01-13 DIAGNOSIS — N39.0 FREQUENT UTI: ICD-10-CM

## 2022-01-13 DIAGNOSIS — R05.8 COUGH PRODUCTIVE OF PURULENT SPUTUM: ICD-10-CM

## 2022-01-13 NOTE — TELEPHONE ENCOUNTER
Spoke with  nurse Henna, states patient has slight swelling on left side under eye and is blowing out of nose green mucous & coughing up green mucous.     Wanted to check and see if an antibiotic could be ordered.  Also requesting a standing order for urine collection/culture as patient is prone to frequent UTI's and is chair and bed bound.  States would save time in calling and calling each time.  Informed will route requests to provider for advisement.

## 2022-01-13 NOTE — TELEPHONE ENCOUNTER
----- Message from Toi Jurado sent at 1/13/2022 12:34 PM CST -----  Contact: [facility]  Type: Needs Medical Advice  Who Called:  [facility]  Symptoms (please be specific):  n/a  How long has patient had these symptoms:  n/a  Pharmacy name and phone #:  n/a  Best Call Back Number: [phone]  Additional Information: Henna stated she went for visit today & patient stated she has swelling on right side of face by nose & that patient is blowing out of nose green mucous & coughing up green mucous.

## 2022-01-14 RX ORDER — AZITHROMYCIN 250 MG/1
TABLET, FILM COATED ORAL
Qty: 6 TABLET | Refills: 0 | Status: SHIPPED | OUTPATIENT
Start: 2022-01-14 | End: 2022-01-19

## 2022-01-21 ENCOUNTER — PATIENT MESSAGE (OUTPATIENT)
Dept: FAMILY MEDICINE | Facility: CLINIC | Age: 82
End: 2022-01-21

## 2022-01-21 ENCOUNTER — OFFICE VISIT (OUTPATIENT)
Dept: FAMILY MEDICINE | Facility: CLINIC | Age: 82
End: 2022-01-21
Payer: MEDICARE

## 2022-01-21 VITALS
BODY MASS INDEX: 17.77 KG/M2 | HEIGHT: 60 IN | DIASTOLIC BLOOD PRESSURE: 72 MMHG | RESPIRATION RATE: 14 BRPM | SYSTOLIC BLOOD PRESSURE: 124 MMHG

## 2022-01-21 DIAGNOSIS — R53.81 DEBILITATED: ICD-10-CM

## 2022-01-21 DIAGNOSIS — F41.1 GAD (GENERALIZED ANXIETY DISORDER): ICD-10-CM

## 2022-01-21 DIAGNOSIS — R53.1 WEAKNESS GENERALIZED: ICD-10-CM

## 2022-01-21 DIAGNOSIS — F33.1 MODERATE EPISODE OF RECURRENT MAJOR DEPRESSIVE DISORDER: ICD-10-CM

## 2022-01-21 DIAGNOSIS — N39.0 FREQUENT UTI: ICD-10-CM

## 2022-01-21 PROCEDURE — 99999 PR PBB SHADOW E&M-EST. PATIENT-LVL IV: CPT | Mod: PBBFAC,,, | Performed by: NURSE PRACTITIONER

## 2022-01-21 PROCEDURE — 99999 PR PBB SHADOW E&M-EST. PATIENT-LVL IV: ICD-10-PCS | Mod: PBBFAC,,, | Performed by: NURSE PRACTITIONER

## 2022-01-21 PROCEDURE — 99214 PR OFFICE/OUTPT VISIT, EST, LEVL IV, 30-39 MIN: ICD-10-PCS | Mod: S$PBB,,, | Performed by: NURSE PRACTITIONER

## 2022-01-21 PROCEDURE — 99214 OFFICE O/P EST MOD 30 MIN: CPT | Mod: S$PBB,,, | Performed by: NURSE PRACTITIONER

## 2022-01-21 PROCEDURE — 99214 OFFICE O/P EST MOD 30 MIN: CPT | Mod: PBBFAC | Performed by: NURSE PRACTITIONER

## 2022-01-21 NOTE — PATIENT INSTRUCTIONS
1- continue tresiba yet inc to 40 units and inc to 45 2 week and stay there.   2- glipizide discontinued  3- continue home health PT  4- Urine results requested from [facility]  5- continue all meds  6- See Dr. Valdivia in Feb 7- 12/15/21 referral to podiatry yet has not received call from office  8- RTC 6 weeks, fax note to Dr. Valdivia  9- labs after 2/5 to be drawn by    -

## 2022-01-21 NOTE — PROGRESS NOTES
Subjective:       Patient ID: Maliha Jaramillo is a 81 y.o. female.    Chief Complaint: Nail Problem and Weakness  -  80 y/o female presents with her son for 6 wk follow up. Heel wound completely heeled. Glipizide has been held d/t hypoglycemia every time it is given. Tresiba at 35 units for a month with am glucose 220-300 yet is holding if glucose at 200 but told to give every night. Does not have to check at bedtime.     Asks if she has a nail fungus yet no evidence but are brittle. Continues abilify and paxil but depression significantly improved and looks like her old self. Is now only taking xanax qhs for insomnia. Less day time lethargy reported.    Fell 5 times in 2 weeks d/t pt getting up on her own, no injuries. PT still coming to house-marching in place and stepping side to side. PT was going to cancel for lack of participation but has picked up the pace. Has 7 more weeks of HHPT then will reevaluate OPPT. PT still will not do if tachycardia 120 with activity yet 80-90 rest, drinking 64 oz a day.   UA home collect 2 days ago, HH called for results    Will see oncology q6 mo for PET reports lung cancer is resolved.   -  Past Medical History:   Diagnosis Date    Adenocarcinoma of left lung     Anxiety 1954    Arthritis 2013    Atrial fibrillation     Cancer     Adenocarcinoma of left lung    Diabetes mellitus     Hyperlipidemia 2012    Hypertension     Hypothyroidism (acquired) 2004    Osteoporosis 07/2019    Tachycardia        Past Surgical History:   Procedure Laterality Date    APPENDECTOMY  1948    BREAST BIOPSY Right 1980's    benign    EPIDURAL STEROID INJECTION N/A 2/25/2021    Procedure: Injection, Steroid, Epidural L4-L5;  Surgeon: Dereck Cervantes MD;  Location: [facility] OR;  Service: Pain Management;  Laterality: N/A;  oral    EPIDURAL STEROID INJECTION N/A 9/8/2021    Procedure: Injection, Steroid, Epidural;  Surgeon: Dereck Cervantes MD;  Location: [facility] OR;  Service: Pain  Management;  Laterality: N/A;  L4-L5     EPIDURAL STEROID INJECTION N/A 9/15/2021    Procedure: Injection, Steroid, Epidural;  Surgeon: Dereck Cervantes MD;  Location: [facility] OR;  Service: Pain Management;  Laterality: N/A;  L4-L5     EPIDURAL STEROID INJECTION INTO LUMBAR SPINE N/A 11/16/2020    Procedure: Injection-steroid-epidural-lumbar;  Surgeon: Dereck Cervantes MD;  Location: [facility] OR;  Service: Pain Management;  Laterality: N/A;  L4-L5    ESOPHAGOGASTRODUODENOSCOPY N/A 6/25/2020    Procedure: ESOPHAGOGASTRODUODENOSCOPY (EGD);  Surgeon: Ruben Adame MD;  Location: [facility] ENDO;  Service: General;  Laterality: N/A;    EYE SURGERY      HYSTERECTOMY  1978    TONSILLECTOMY  1945    TOTAL REDUCTION MAMMOPLASTY Bilateral 1987    TUBAL LIGATION          Social History     Socioeconomic History    Marital status:    Tobacco Use    Smoking status: Former Smoker     Packs/day: 0.00     Years: 0.00     Pack years: 0.00    Smokeless tobacco: Never Used   Substance and Sexual Activity    Alcohol use: Yes     Alcohol/week: 1.0 standard drink     Types: 1 Standard drinks or equivalent per week     Comment: Couple of drinks per month    Drug use: No    Sexual activity: Not Currently     Partners: Male     Birth control/protection: Condom, Post-menopausal, Spermicide       Family History   Problem Relation Age of Onset    Cancer Sister         Adenocarcinoma    Stroke Father        Review of patient's allergies indicates:   Allergen Reactions    Flu vaccine [id number](5 yr up) Anaphylaxis     Patient's left side of the face is severely swollen on the left side then became unresponsive.     Pneumococcal vaccine Anaphylaxis     Patient's left side of the face is severely swollen on the left side then became unresponsive. Had flu shot same day.     Ciprofloxacin Rash    Ace inhibitors Other (See Comments)     cough          Current Outpatient Medications:     ALPRAZolam (XANAX) 0.25 MG tablet, Take 1  "tablet (0.25 mg total) by mouth 2 (two) times daily as needed for Anxiety., Disp: 60 tablet, Rfl: 2    ARIPiprazole (ABILIFY) 2 MG Tab, Take 1 tablet (2 mg total) by mouth once daily., Disp: 30 tablet, Rfl: 1    atorvastatin (LIPITOR) 40 MG tablet, Take 1 tablet (40 mg total) by mouth once daily., Disp: 90 tablet, Rfl: 3    blood sugar diagnostic Strp, To check BG 2 times daily, to use with insurance preferred meter, Disp: 200 each, Rfl: 1    blood-glucose meter kit, To check BG 2 times daily, to use with insurance preferred meter, Disp: 1 each, Rfl: 3    carvediloL (COREG) 6.25 MG tablet, Take 1 tablet (6.25 mg total) by mouth 2 (two) times daily with meals., Disp: 60 tablet, Rfl: 11    diltiaZEM (CARDIZEM LA) 180 mg 24 hr tablet, Take 1 tablet (180 mg total) by mouth once daily., Disp: 90 tablet, Rfl: 1    dulaglutide (TRULICITY) 3 mg/0.5 mL pen injector, Inject 3 mg into the skin every 7 days., Disp: 4 pen, Rfl: 11    furosemide (LASIX) 40 MG tablet, Take 1 tablet (40 mg total) by mouth once daily., Disp: 30 tablet, Rfl: 5    imipramine (TOFRANIL) 50 MG tablet, TAKE 1 TABLET BY MOUTH ONCE DAILY IN THE EVENING, Disp: 90 tablet, Rfl: 0    insulin degludec (TRESIBA FLEXTOUCH U-100) 100 unit/mL (3 mL) insulin pen, Inject 25 Units into the skin every evening., Disp: 6 pen, Rfl: 3    lancets Misc, To check BG 2 times daily, to use with insurance preferred meter, Disp: 200 each, Rfl: 1    levothyroxine (SYNTHROID) 150 MCG tablet, Take 1 tablet (150 mcg total) by mouth before breakfast., Disp: 90 tablet, Rfl: 0    multivit-min/ferrous fumarate (MULTI VITAMIN ORAL),  1 tab, Oral, Daily, 0 Refill(s), Disp: , Rfl:     nystatin (MYCOSTATIN) powder, Apply topically 4 (four) times daily., Disp: 60 g, Rfl: 2    paroxetine (PAXIL) 10 MG tablet, Take 1 tablet (10 mg total) by mouth every morning., Disp: 90 tablet, Rfl: 1    pen needle, diabetic (1ST TIER UNIFINE PENTIPS) 32 gauge x 5/32" Ndle, 1 Device by " Misc.(Non-Drug; Combo Route) route every evening., Disp: 200 each, Rfl: 4    potassium chloride (MICRO-K) 10 MEQ CpSR, Take 1 capsule (10 mEq total) by mouth 2 (two) times daily., Disp: 180 capsule, Rfl: 1    rivaroxaban (XARELTO) 20 mg Tab, Take 1 tablet (20 mg total) by mouth daily with dinner or evening meal., Disp: 90 tablet, Rfl: 1    SANTYL ointment, Apply topically., Disp: , Rfl:     HPI  Review of Systems   Constitutional: Negative.         Debilitated, thin in wheel chair.    HENT: Negative.    Eyes: Negative.    Respiratory: Negative.    Cardiovascular: Negative.    Gastrointestinal: Negative.    Endocrine: Negative.    Genitourinary: Negative.    Musculoskeletal: Negative.    Skin: Negative.    Allergic/Immunologic: Negative.    Neurological: Positive for weakness.   Hematological: Negative.    Psychiatric/Behavioral: Negative.        Objective:      Physical Exam  Vitals and nursing note reviewed. Exam conducted with a chaperone present (Son).   Constitutional:       Appearance: Normal appearance. She is well-developed and well-nourished.      Comments: thin   HENT:      Head: Normocephalic and atraumatic.      Mouth/Throat:      Mouth: Oropharynx is clear and moist.   Eyes:      General: No scleral icterus.     Conjunctiva/sclera: Conjunctivae normal.   Neck:      Thyroid: No thyromegaly.   Cardiovascular:      Rate and Rhythm: Normal rate and regular rhythm.      Heart sounds: No murmur heard.      Pulmonary:      Effort: Pulmonary effort is normal. No respiratory distress.   Musculoskeletal:         General: No edema. Normal range of motion.      Cervical back: Normal range of motion and neck supple.   Skin:     General: Skin is warm.      Findings: No rash.   Neurological:      Mental Status: She is alert and oriented to person, place, and time. Mental status is at baseline.      Sensory: No sensory deficit.      Motor: No abnormal muscle tone.   Psychiatric:         Mood and Affect: Mood and  affect and mood normal.         Behavior: Behavior normal.         Thought Content: Thought content normal.         Judgment: Judgment normal.         Assessment:       1. Uncontrolled type 2 DM with peripheral circulatory disorder    2. Frequent UTI    3. Weakness generalized    4. Debilitated    5. Moderate episode of recurrent major depressive disorder    6. ROSIBEL (generalized anxiety disorder)        Plan:       1- continue tresiba yet inc to 40 units and inc to 45 next week and stay there.   2- glipizide discontinued  3- continue home health PT  4- Urine results requested from [facility]  5- continue all meds  6- See Dr. Valdivia in Feb 7- 12/15/21 referral to podiatry yet has not received call from office  8- RTC 6 weeks, fax note to Dr. Valdivia  9- labs after 2/5 to be drawn by    -   Uncontrolled type 2 DM with peripheral circulatory disorder  -     Ambulatory referral/consult to Podiatry; Future; Expected date: 01/28/2022    Frequent UTI    Weakness generalized    Debilitated    Moderate episode of recurrent major depressive disorder    ROSIBEL (generalized anxiety disorder)        Risks, benefits, and side effects were discussed with the patient. All questions were answered to the fullest satisfaction of the patient, and pt verbalized understanding and agreement to treatment plan. Pt was to call with any new or worsening symptoms, or present to the ER.

## 2022-01-26 ENCOUNTER — TELEPHONE (OUTPATIENT)
Dept: FAMILY MEDICINE | Facility: CLINIC | Age: 82
End: 2022-01-26
Payer: MEDICARE

## 2022-01-26 NOTE — TELEPHONE ENCOUNTER
Attempted to contact patient, lvm to return call or if having symptoms with trouble breathing to go to the ER. X1        ----- Message from Roberta Simon sent at 1/26/2022  3:39 PM CST -----  Contact: son  Type: Needs Medical Advice  Who Called:  Drake Abdul (Son)  Best Call Back Number: [phone] (home)   Additional Information: home health nurse thinks patient has covid- body aches, confused, oxygen level is 90 and BP is 90/60, pulse 106. Son wants to know what he should do??

## 2022-01-27 ENCOUNTER — LAB VISIT (OUTPATIENT)
Dept: FAMILY MEDICINE | Facility: CLINIC | Age: 82
End: 2022-01-27
Payer: MEDICARE

## 2022-01-27 DIAGNOSIS — R41.0 CONFUSION: ICD-10-CM

## 2022-01-27 DIAGNOSIS — R52 BODY ACHES: Primary | ICD-10-CM

## 2022-01-27 PROCEDURE — U0003 INFECTIOUS AGENT DETECTION BY NUCLEIC ACID (DNA OR RNA); SEVERE ACUTE RESPIRATORY SYNDROME CORONAVIRUS 2 (SARS-COV-2) (CORONAVIRUS DISEASE [COVID-19]), AMPLIFIED PROBE TECHNIQUE, MAKING USE OF HIGH THROUGHPUT TECHNOLOGIES AS DESCRIBED BY CMS-2020-01-R: HCPCS | Performed by: FAMILY MEDICINE

## 2022-01-27 PROCEDURE — U0005 INFEC AGEN DETEC AMPLI PROBE: HCPCS | Performed by: FAMILY MEDICINE

## 2022-01-27 NOTE — PROGRESS NOTES
Maliha Jaramillo presented to clinic for COVID-19 swab.   Maliha Jaramillo verified x2, name and .   Maliha Jaramillo instructed on what will be completed, asked if ever had COVID-19 swab.   Explained procedure to Maliha Jaramillo.   Specimen obtained.   No questions or concerns voiced further at this time.   Maliha Jaramillo left in satisfactory condition.

## 2022-01-28 LAB
SARS-COV-2 RNA RESP QL NAA+PROBE: NOT DETECTED
SARS-COV-2- CYCLE NUMBER: NORMAL

## 2022-02-03 DIAGNOSIS — R00.0 TACHYCARDIA: ICD-10-CM

## 2022-02-03 DIAGNOSIS — F33.1 MODERATE EPISODE OF RECURRENT MAJOR DEPRESSIVE DISORDER: ICD-10-CM

## 2022-02-03 RX ORDER — ALPRAZOLAM 0.25 MG/1
0.25 TABLET ORAL NIGHTLY PRN
Qty: 30 TABLET | Refills: 2 | Status: SHIPPED | OUTPATIENT
Start: 2022-02-03 | End: 2022-04-25 | Stop reason: SDUPTHER

## 2022-02-03 RX ORDER — ARIPIPRAZOLE 2 MG/1
2 TABLET ORAL DAILY
Qty: 30 TABLET | Refills: 5 | Status: SHIPPED | OUTPATIENT
Start: 2022-02-03 | End: 2022-02-09 | Stop reason: SDUPTHER

## 2022-02-03 RX ORDER — CARVEDILOL 6.25 MG/1
6.25 TABLET ORAL 2 TIMES DAILY WITH MEALS
Qty: 60 TABLET | Refills: 5 | Status: SHIPPED | OUTPATIENT
Start: 2022-02-03 | End: 2022-03-09 | Stop reason: ALTCHOICE

## 2022-02-03 NOTE — TELEPHONE ENCOUNTER
----- Message from Toi Jurado sent at 2/3/2022  9:21 AM CST -----  Contact: Marco A/[facility]  Type:  RX Refill Request    Who Called:  Marco A/[facility]  Refill or New Rx:  new    ARIPiprazole (ABILIFY) 2 MG Tab 30 tablet 1 12/3/2021 2/1/2022 --  Sig - Route: Take 1 tablet (2 mg total) by mouth once daily. - Oral  Sent to pharmacy as: ARIPiprazole (ABILIFY) 2 MG Tab    carvediloL (COREG) 6.25 MG tablet 60 tablet 11 12/15/2021 12/15/2022 --  Sig - Route: Take 1 tablet (6.25 mg total) by mouth 2 (two) times daily with meals. - Oral    ALPRAZolam (XANAX) 0.25 MG tablet 60 tablet 2 11/15/2021 2/13/2022 --  Sig - Route: Take 1 tablet (0.25 mg total) by mouth 2 (two) times daily as needed for Anxiety. - Oral  Sent to pharmacy as: ALPRAZolam (XANAX) 0.25 MG tablet        Preferred Pharmacy with phone number:      [id number] - [place], [place] - [address] [address] [address]  Phone: [phone] Fax: [phone]    Ordering Provider:  Tabatha Farfan Call Back Number:  See Above  Additional Information:  patient changing pharmacy

## 2022-02-07 ENCOUNTER — TELEPHONE (OUTPATIENT)
Dept: FAMILY MEDICINE | Facility: CLINIC | Age: 82
End: 2022-02-07
Payer: MEDICARE

## 2022-02-07 NOTE — TELEPHONE ENCOUNTER
----- Message from Any Goins sent at 2/7/2022 10:40 AM CST -----  Patient's Son Drake is calling to request that the home health agency draw the labs that are ordered.  Please call him to let him know at [phone].  Thank you!

## 2022-02-07 NOTE — TELEPHONE ENCOUNTER
Pts son would like for HH company to draw pts labs. Faxed orders to [facility]. PTs son verbalized understanding.

## 2022-02-09 ENCOUNTER — OFFICE VISIT (OUTPATIENT)
Dept: FAMILY MEDICINE | Facility: CLINIC | Age: 82
End: 2022-02-09
Payer: MEDICARE

## 2022-02-09 ENCOUNTER — LAB VISIT (OUTPATIENT)
Dept: LAB | Facility: HOSPITAL | Age: 82
End: 2022-02-09
Attending: NURSE PRACTITIONER
Payer: MEDICARE

## 2022-02-09 VITALS
SYSTOLIC BLOOD PRESSURE: 102 MMHG | HEIGHT: 60 IN | BODY MASS INDEX: 17.87 KG/M2 | WEIGHT: 91 LBS | HEART RATE: 82 BPM | TEMPERATURE: 97 F | DIASTOLIC BLOOD PRESSURE: 56 MMHG | OXYGEN SATURATION: 98 %

## 2022-02-09 DIAGNOSIS — F33.1 MODERATE EPISODE OF RECURRENT MAJOR DEPRESSIVE DISORDER: ICD-10-CM

## 2022-02-09 DIAGNOSIS — E03.9 ACQUIRED HYPOTHYROIDISM: ICD-10-CM

## 2022-02-09 DIAGNOSIS — R53.1 WEAKNESS GENERALIZED: ICD-10-CM

## 2022-02-09 DIAGNOSIS — E78.2 MIXED HYPERLIPIDEMIA: ICD-10-CM

## 2022-02-09 DIAGNOSIS — N39.0 CHRONIC UTI: Primary | ICD-10-CM

## 2022-02-09 DIAGNOSIS — I10 ESSENTIAL HYPERTENSION: ICD-10-CM

## 2022-02-09 DIAGNOSIS — I50.42 CHRONIC COMBINED SYSTOLIC AND DIASTOLIC CONGESTIVE HEART FAILURE: ICD-10-CM

## 2022-02-09 DIAGNOSIS — R39.9 UTI SYMPTOMS: ICD-10-CM

## 2022-02-09 DIAGNOSIS — F33.2 SEVERE EPISODE OF RECURRENT MAJOR DEPRESSIVE DISORDER, WITHOUT PSYCHOTIC FEATURES: ICD-10-CM

## 2022-02-09 DIAGNOSIS — I47.10 PAROXYSMAL SVT (SUPRAVENTRICULAR TACHYCARDIA): ICD-10-CM

## 2022-02-09 DIAGNOSIS — R53.81 DEBILITATED: ICD-10-CM

## 2022-02-09 LAB
ALBUMIN SERPL BCP-MCNC: 3.1 G/DL (ref 3.5–5.2)
ALP SERPL-CCNC: 139 U/L (ref 55–135)
ALT SERPL W/O P-5'-P-CCNC: 9 U/L (ref 10–44)
ANION GAP SERPL CALC-SCNC: 17 MMOL/L (ref 8–16)
AST SERPL-CCNC: 20 U/L (ref 10–40)
BASOPHILS # BLD AUTO: 0.07 K/UL (ref 0–0.2)
BASOPHILS NFR BLD: 0.6 % (ref 0–1.9)
BILIRUB SERPL-MCNC: 0.3 MG/DL (ref 0.1–1)
BNP SERPL-MCNC: 103 PG/ML (ref 0–99)
BUN SERPL-MCNC: 13 MG/DL (ref 8–23)
CALCIUM SERPL-MCNC: 9.8 MG/DL (ref 8.7–10.5)
CHLORIDE SERPL-SCNC: 96 MMOL/L (ref 95–110)
CHOLEST SERPL-MCNC: 247 MG/DL (ref 120–199)
CHOLEST/HDLC SERPL: 7.1 {RATIO} (ref 2–5)
CO2 SERPL-SCNC: 22 MMOL/L (ref 23–29)
CREAT SERPL-MCNC: 1.6 MG/DL (ref 0.5–1.4)
DIFFERENTIAL METHOD: ABNORMAL
EOSINOPHIL # BLD AUTO: 0.5 K/UL (ref 0–0.5)
EOSINOPHIL NFR BLD: 4.6 % (ref 0–8)
ERYTHROCYTE [DISTWIDTH] IN BLOOD BY AUTOMATED COUNT: 16.1 % (ref 11.5–14.5)
EST. GFR  (AFRICAN AMERICAN): 34.1 ML/MIN/1.73 M^2
EST. GFR  (NON AFRICAN AMERICAN): 29.6 ML/MIN/1.73 M^2
ESTIMATED AVG GLUCOSE: 226 MG/DL (ref 68–131)
GLUCOSE SERPL-MCNC: 206 MG/DL (ref 70–110)
HBA1C MFR BLD: 9.5 % (ref 4–5.6)
HCT VFR BLD AUTO: 32.1 % (ref 37–48.5)
HDLC SERPL-MCNC: 35 MG/DL (ref 40–75)
HDLC SERPL: 14.2 % (ref 20–50)
HGB BLD-MCNC: 10.4 G/DL (ref 12–16)
IMM GRANULOCYTES # BLD AUTO: 0.04 K/UL (ref 0–0.04)
IMM GRANULOCYTES NFR BLD AUTO: 0.4 % (ref 0–0.5)
LDLC SERPL CALC-MCNC: 167.6 MG/DL (ref 63–159)
LYMPHOCYTES # BLD AUTO: 3.8 K/UL (ref 1–4.8)
LYMPHOCYTES NFR BLD: 34.7 % (ref 18–48)
MCH RBC QN AUTO: 27.5 PG (ref 27–31)
MCHC RBC AUTO-ENTMCNC: 32.4 G/DL (ref 32–36)
MCV RBC AUTO: 85 FL (ref 82–98)
MONOCYTES # BLD AUTO: 0.9 K/UL (ref 0.3–1)
MONOCYTES NFR BLD: 7.9 % (ref 4–15)
NEUTROPHILS # BLD AUTO: 5.7 K/UL (ref 1.8–7.7)
NEUTROPHILS NFR BLD: 51.8 % (ref 38–73)
NONHDLC SERPL-MCNC: 212 MG/DL
NRBC BLD-RTO: 0 /100 WBC
PLATELET # BLD AUTO: 362 K/UL (ref 150–450)
PMV BLD AUTO: 9.4 FL (ref 9.2–12.9)
POTASSIUM SERPL-SCNC: 4.4 MMOL/L (ref 3.5–5.1)
PROT SERPL-MCNC: 7.5 G/DL (ref 6–8.4)
RBC # BLD AUTO: 3.78 M/UL (ref 4–5.4)
SODIUM SERPL-SCNC: 135 MMOL/L (ref 136–145)
T4 FREE SERPL-MCNC: 1.84 NG/DL (ref 0.71–1.51)
TRIGL SERPL-MCNC: 222 MG/DL (ref 30–150)
TSH SERPL DL<=0.005 MIU/L-ACNC: <0.01 UIU/ML (ref 0.4–4)
WBC # BLD AUTO: 10.95 K/UL (ref 3.9–12.7)

## 2022-02-09 PROCEDURE — 83880 ASSAY OF NATRIURETIC PEPTIDE: CPT | Performed by: NURSE PRACTITIONER

## 2022-02-09 PROCEDURE — 99214 OFFICE O/P EST MOD 30 MIN: CPT | Mod: PBBFAC | Performed by: NURSE PRACTITIONER

## 2022-02-09 PROCEDURE — 36415 COLL VENOUS BLD VENIPUNCTURE: CPT | Performed by: NURSE PRACTITIONER

## 2022-02-09 PROCEDURE — 80061 LIPID PANEL: CPT | Performed by: NURSE PRACTITIONER

## 2022-02-09 PROCEDURE — 84443 ASSAY THYROID STIM HORMONE: CPT | Performed by: NURSE PRACTITIONER

## 2022-02-09 PROCEDURE — 83036 HEMOGLOBIN GLYCOSYLATED A1C: CPT | Performed by: NURSE PRACTITIONER

## 2022-02-09 PROCEDURE — 99214 PR OFFICE/OUTPT VISIT, EST, LEVL IV, 30-39 MIN: ICD-10-PCS | Mod: S$PBB,,, | Performed by: NURSE PRACTITIONER

## 2022-02-09 PROCEDURE — 85025 COMPLETE CBC W/AUTO DIFF WBC: CPT | Performed by: NURSE PRACTITIONER

## 2022-02-09 PROCEDURE — 80053 COMPREHEN METABOLIC PANEL: CPT | Performed by: NURSE PRACTITIONER

## 2022-02-09 PROCEDURE — 99999 PR PBB SHADOW E&M-EST. PATIENT-LVL IV: ICD-10-PCS | Mod: PBBFAC,,, | Performed by: NURSE PRACTITIONER

## 2022-02-09 PROCEDURE — 84439 ASSAY OF FREE THYROXINE: CPT | Performed by: NURSE PRACTITIONER

## 2022-02-09 PROCEDURE — 99999 PR PBB SHADOW E&M-EST. PATIENT-LVL IV: CPT | Mod: PBBFAC,,, | Performed by: NURSE PRACTITIONER

## 2022-02-09 PROCEDURE — 99214 OFFICE O/P EST MOD 30 MIN: CPT | Mod: S$PBB,,, | Performed by: NURSE PRACTITIONER

## 2022-02-09 RX ORDER — PAROXETINE 10 MG/1
10 TABLET, FILM COATED ORAL NIGHTLY
Qty: 90 TABLET | Refills: 1 | Status: SHIPPED | OUTPATIENT
Start: 2022-02-09 | End: 2022-01-01 | Stop reason: SDUPTHER

## 2022-02-09 RX ORDER — SULFAMETHOXAZOLE AND TRIMETHOPRIM 800; 160 MG/1; MG/1
1 TABLET ORAL 2 TIMES DAILY
COMMUNITY
Start: 2022-02-04 | End: 2022-03-09

## 2022-02-09 RX ORDER — DILTIAZEM HYDROCHLORIDE EXTENDED-RELEASE TABLETS 180 MG/1
180 TABLET, EXTENDED RELEASE ORAL DAILY
Qty: 90 TABLET | Refills: 1 | Status: SHIPPED | OUTPATIENT
Start: 2022-02-09 | End: 2022-01-01 | Stop reason: SDUPTHER

## 2022-02-09 RX ORDER — NITROFURANTOIN MACROCRYSTALS 50 MG/1
50 CAPSULE ORAL NIGHTLY
Qty: 90 CAPSULE | Refills: 1 | Status: SHIPPED | OUTPATIENT
Start: 2022-02-09 | End: 2022-01-01

## 2022-02-09 RX ORDER — ARIPIPRAZOLE 2 MG/1
2 TABLET ORAL NIGHTLY
Qty: 90 TABLET | Refills: 1
Start: 2022-02-09 | End: 2022-03-05

## 2022-02-09 NOTE — PROGRESS NOTES
Subjective:       Patient ID: Maliha Jaramillo is a 81 y.o. female.    Chief Complaint: Follow-up (Pt is concerned about her blood pressure )  -  80 y/o female presents for ER follow up seen at [facility] dx with UTI treated yet no culture done, reports today is the first day she does not have burning. Left heel wound healed. 1030 AM /60 HR 82-90 but 1130 pm 86/60 HR 83. Under the care of Dr. Valdivia.     Today was the first time the patient ever talked about being content if she dies-like she has gained acceptance which was the opposite 2 mo ago, thus reassured her thought are normal and acceptable for an 80 y/o with multiple comorbities, very deconditioned and recent radiation for lung cancer.    PT refusing to work with pt when HR is elevated 115 yet has paroxysmal afib and not getting her therapy. Bathing only once weeky and being scared by home health about her CHF.  -     Past Medical History:   Diagnosis Date    Adenocarcinoma of left lung     Anxiety 1954    Arthritis 2013    Cancer     Adenocarcinoma of left lung    Diabetes mellitus     Hyperlipidemia 2012    Hypertension     Hypothyroidism (acquired) 2004    Osteoporosis 07/2019    Paroxysmal A-fib     Tachycardia        Past Surgical History:   Procedure Laterality Date    APPENDECTOMY  1948    BREAST BIOPSY Right 1980's    benign    EPIDURAL STEROID INJECTION N/A 2/25/2021    Procedure: Injection, Steroid, Epidural L4-L5;  Surgeon: Dereck Cervantes MD;  Location: [facility] OR;  Service: Pain Management;  Laterality: N/A;  oral    EPIDURAL STEROID INJECTION N/A 9/8/2021    Procedure: Injection, Steroid, Epidural;  Surgeon: Dereck Cervantes MD;  Location: [facility] OR;  Service: Pain Management;  Laterality: N/A;  L4-L5     EPIDURAL STEROID INJECTION N/A 9/15/2021    Procedure: Injection, Steroid, Epidural;  Surgeon: Dereck Cervantes MD;  Location: [facility] OR;  Service: Pain Management;  Laterality: N/A;  L4-L5     EPIDURAL STEROID  INJECTION INTO LUMBAR SPINE N/A 11/16/2020    Procedure: Injection-steroid-epidural-lumbar;  Surgeon: Dereck Cervantes MD;  Location: [facility] OR;  Service: Pain Management;  Laterality: N/A;  L4-L5    ESOPHAGOGASTRODUODENOSCOPY N/A 6/25/2020    Procedure: ESOPHAGOGASTRODUODENOSCOPY (EGD);  Surgeon: Ruben Adame MD;  Location: [facility] ENDO;  Service: General;  Laterality: N/A;    EYE SURGERY      HYSTERECTOMY  1978    TONSILLECTOMY  1945    TOTAL REDUCTION MAMMOPLASTY Bilateral 1987    TUBAL LIGATION          Social History     Socioeconomic History    Marital status:    Tobacco Use    Smoking status: Former Smoker     Packs/day: 0.00     Years: 0.00     Pack years: 0.00    Smokeless tobacco: Never Used   Substance and Sexual Activity    Alcohol use: Yes     Alcohol/week: 1.0 standard drink     Types: 1 Standard drinks or equivalent per week     Comment: Couple of drinks per month    Drug use: No    Sexual activity: Not Currently     Partners: Male     Birth control/protection: Condom, Post-menopausal, Spermicide       Family History   Problem Relation Age of Onset    Cancer Sister         Adenocarcinoma    Stroke Father        Review of patient's allergies indicates:   Allergen Reactions    Flu vaccine [id number](5 yr up) Anaphylaxis     Patient's left side of the face is severely swollen on the left side then became unresponsive.     Pneumococcal vaccine Anaphylaxis     Patient's left side of the face is severely swollen on the left side then became unresponsive. Had flu shot same day.     Ciprofloxacin Rash    Ace inhibitors Other (See Comments)     cough          Current Outpatient Medications:     ALPRAZolam (XANAX) 0.25 MG tablet, Take 1 tablet (0.25 mg total) by mouth nightly as needed for Insomnia., Disp: 30 tablet, Rfl: 2    blood sugar diagnostic Strp, To check BG 2 times daily, to use with insurance preferred meter, Disp: 200 each, Rfl: 1    blood-glucose meter kit, To check BG 2  "times daily, to use with insurance preferred meter, Disp: 1 each, Rfl: 3    carvediloL (COREG) 6.25 MG tablet, Take 1 tablet (6.25 mg total) by mouth 2 (two) times daily with meals., Disp: 60 tablet, Rfl: 5    dulaglutide (TRULICITY) 3 mg/0.5 mL pen injector, Inject 3 mg into the skin every 7 days., Disp: 4 pen, Rfl: 11    furosemide (LASIX) 40 MG tablet, Take 1 tablet (40 mg total) by mouth once daily., Disp: 30 tablet, Rfl: 5    imipramine (TOFRANIL) 50 MG tablet, TAKE 1 TABLET BY MOUTH ONCE DAILY IN THE EVENING, Disp: 90 tablet, Rfl: 0    insulin degludec (TRESIBA FLEXTOUCH U-100) 100 unit/mL (3 mL) insulin pen, Inject 25 Units into the skin every evening., Disp: 6 pen, Rfl: 3    lancets Misc, To check BG 2 times daily, to use with insurance preferred meter, Disp: 200 each, Rfl: 1    multivit-min/ferrous fumarate (MULTI VITAMIN ORAL),  1 tab, Oral, Daily, 0 Refill(s), Disp: , Rfl:     nystatin (MYCOSTATIN) powder, Apply topically 4 (four) times daily., Disp: 60 g, Rfl: 2    pen needle, diabetic (1ST TIER UNIFINE PENTIPS) 32 gauge x 5/32" Ndle, 1 Device by Misc.(Non-Drug; Combo Route) route every evening., Disp: 200 each, Rfl: 4    potassium chloride (MICRO-K) 10 MEQ CpSR, Take 1 capsule (10 mEq total) by mouth 2 (two) times daily., Disp: 180 capsule, Rfl: 1    rivaroxaban (XARELTO) 20 mg Tab, Take 1 tablet (20 mg total) by mouth daily with dinner or evening meal., Disp: 90 tablet, Rfl: 1    SANTYL ointment, Apply topically., Disp: , Rfl:     sulfamethoxazole-trimethoprim 800-160mg (BACTRIM DS) 800-160 mg Tab, Take 1 tablet by mouth 2 (two) times daily., Disp: , Rfl:     ARIPiprazole (ABILIFY) 2 MG Tab, Take 1 tablet (2 mg total) by mouth every evening., Disp: 90 tablet, Rfl: 1    diltiaZEM (CARDIZEM LA) 180 mg 24 hr tablet, Take 1 tablet (180 mg total) by mouth once daily., Disp: 90 tablet, Rfl: 1    levothyroxine (SYNTHROID) 112 MCG tablet, Take 1 tablet (112 mcg total) by mouth before " breakfast., Disp: 60 tablet, Rfl: 0    nitrofurantoin (MACRODANTIN) 50 MG capsule, Take 1 capsule (50 mg total) by mouth every evening., Disp: 90 capsule, Rfl: 1    paroxetine (PAXIL) 10 MG tablet, Take 1 tablet (10 mg total) by mouth every evening., Disp: 90 tablet, Rfl: 1    HPI  Review of Systems   Constitutional: Negative.    HENT: Negative.    Eyes: Negative.    Respiratory: Negative.    Cardiovascular: Negative.    Gastrointestinal: Negative.    Endocrine: Negative.    Genitourinary: Negative.    Musculoskeletal: Negative.    Skin: Negative.    Allergic/Immunologic: Negative.    Neurological: Positive for weakness.   Hematological: Negative.    Psychiatric/Behavioral: Positive for dysphoric mood and sleep disturbance. The patient is nervous/anxious.        Objective:      Physical Exam  Vitals reviewed.   Constitutional:       Appearance: She is well-developed and well-nourished. She is ill-appearing.      Comments: Thin and frail looking.    HENT:      Head: Normocephalic.   Eyes:      Conjunctiva/sclera: Conjunctivae normal.   Neck:      Thyroid: No thyromegaly.   Cardiovascular:      Rate and Rhythm: Normal rate. Rhythm irregular.      Heart sounds: Normal heart sounds. No murmur heard.      Pulmonary:      Effort: Pulmonary effort is normal.      Breath sounds: Normal breath sounds. No wheezing or rales.   Musculoskeletal:         General: No edema. Normal range of motion.      Cervical back: Normal range of motion and neck supple.   Skin:     General: Skin is warm and dry.   Neurological:      Mental Status: She is alert and oriented to person, place, and time. Mental status is at baseline.   Psychiatric:         Mood and Affect: Mood and affect and mood normal.         Behavior: Behavior normal.         Thought Content: Thought content normal.         Judgment: Judgment normal.         Assessment:       1. Chronic UTI    2. Severe episode of recurrent major depressive disorder, without psychotic  features    3. Moderate episode of recurrent major depressive disorder    4. Chronic combined systolic and diastolic congestive heart failure    5. Weakness generalized    6. Uncontrolled type 2 DM with peripheral circulatory disorder    7. Debilitated    8. UTI symptoms    9. Paroxysmal SVT (supraventricular tachycardia)    10. Essential hypertension    11. Mixed hyperlipidemia    12. Acquired hypothyroidism        Plan:     1- take 1/2 tab coreg twice daily  Hold if < 90/50 send values on 2/17/22  2- labs today- Synthroid dec to 112 mcg from 150 and referred to digital diabetes program.   3- fax note to Dr. Valdivia and request MD last office note.   4- refer to digital diabetes medicine  5- message sent to son and [facility] contacted.    Chronic UTI  -     nitrofurantoin (MACRODANTIN) 50 MG capsule; Take 1 capsule (50 mg total) by mouth every evening.  Dispense: 90 capsule; Refill: 1    Severe episode of recurrent major depressive disorder, without psychotic features  -     paroxetine (PAXIL) 10 MG tablet; Take 1 tablet (10 mg total) by mouth every evening.  Dispense: 90 tablet; Refill: 1    Moderate episode of recurrent major depressive disorder  -     ARIPiprazole (ABILIFY) 2 MG Tab; Take 1 tablet (2 mg total) by mouth every evening.  Dispense: 90 tablet; Refill: 1    Chronic combined systolic and diastolic congestive heart failure  -     BNP; Future; Expected date: 02/09/2022  -     Lipid Panel; Future; Expected date: 02/09/2022  -     Hemoglobin A1C; Future; Expected date: 02/09/2022  -     Lipid Panel; Future; Expected date: 02/09/2022  -     CBC Auto Differential; Future; Expected date: 02/09/2022  -     Comprehensive Metabolic Panel; Future; Expected date: 02/09/2022  -     TSH; Future; Expected date: 02/09/2022  -     T4, Free; Future; Expected date: 02/09/2022    Weakness generalized    Uncontrolled type 2 DM with peripheral circulatory disorder  -     Lipid Panel; Future; Expected date:  02/09/2022  -     Hemoglobin A1C; Future; Expected date: 02/09/2022  -     Lipid Panel; Future; Expected date: 02/09/2022  -     CBC Auto Differential; Future; Expected date: 02/09/2022  -     Comprehensive Metabolic Panel; Future; Expected date: 02/09/2022  -     TSH; Future; Expected date: 02/09/2022  -     T4, Free; Future; Expected date: 02/09/2022  -     Diabetes Digital Medicine ([employer]) Enrollment Order  -     Diabetes Digital Medicine (DD): Assign Onboarding Questionnaires    Debilitated    UTI symptoms    Paroxysmal SVT (supraventricular tachycardia)  -     Lipid Panel; Future; Expected date: 02/09/2022  -     Hemoglobin A1C; Future; Expected date: 02/09/2022  -     Lipid Panel; Future; Expected date: 02/09/2022  -     CBC Auto Differential; Future; Expected date: 02/09/2022  -     Comprehensive Metabolic Panel; Future; Expected date: 02/09/2022  -     TSH; Future; Expected date: 02/09/2022  -     T4, Free; Future; Expected date: 02/09/2022  -     levothyroxine (SYNTHROID) 112 MCG tablet; Take 1 tablet (112 mcg total) by mouth before breakfast.  Dispense: 60 tablet; Refill: 0    Essential hypertension  -     Lipid Panel; Future; Expected date: 02/09/2022  -     Hemoglobin A1C; Future; Expected date: 02/09/2022  -     Lipid Panel; Future; Expected date: 02/09/2022  -     CBC Auto Differential; Future; Expected date: 02/09/2022  -     Comprehensive Metabolic Panel; Future; Expected date: 02/09/2022  -     TSH; Future; Expected date: 02/09/2022  -     T4, Free; Future; Expected date: 02/09/2022    Mixed hyperlipidemia  -     Lipid Panel; Future; Expected date: 02/09/2022  -     Hemoglobin A1C; Future; Expected date: 02/09/2022  -     Lipid Panel; Future; Expected date: 02/09/2022  -     CBC Auto Differential; Future; Expected date: 02/09/2022  -     Comprehensive Metabolic Panel; Future; Expected date: 02/09/2022  -     TSH; Future; Expected date: 02/09/2022  -     T4, Free; Future; Expected date:  02/09/2022    Acquired hypothyroidism  -     Lipid Panel; Future; Expected date: 02/09/2022  -     Hemoglobin A1C; Future; Expected date: 02/09/2022  -     Lipid Panel; Future; Expected date: 02/09/2022  -     CBC Auto Differential; Future; Expected date: 02/09/2022  -     Comprehensive Metabolic Panel; Future; Expected date: 02/09/2022  -     TSH; Future; Expected date: 02/09/2022  -     T4, Free; Future; Expected date: 02/09/2022  -     levothyroxine (SYNTHROID) 112 MCG tablet; Take 1 tablet (112 mcg total) by mouth before breakfast.  Dispense: 60 tablet; Refill: 0    Other orders  -     diltiaZEM (CARDIZEM LA) 180 mg 24 hr tablet; Take 1 tablet (180 mg total) by mouth once daily.  Dispense: 90 tablet; Refill: 1        Risks, benefits, and side effects were discussed with the patient. All questions were answered to the fullest satisfaction of the patient, and pt verbalized understanding and agreement to treatment plan. Pt was to call with any new or worsening symptoms, or present to the ER.

## 2022-02-13 ENCOUNTER — PATIENT MESSAGE (OUTPATIENT)
Dept: FAMILY MEDICINE | Facility: CLINIC | Age: 82
End: 2022-02-13
Payer: MEDICARE

## 2022-02-13 ENCOUNTER — TELEPHONE (OUTPATIENT)
Dept: FAMILY MEDICINE | Facility: CLINIC | Age: 82
End: 2022-02-13
Payer: MEDICARE

## 2022-02-13 RX ORDER — LEVOTHYROXINE SODIUM 112 UG/1
112 TABLET ORAL
Qty: 60 TABLET | Refills: 0 | Status: SHIPPED | OUTPATIENT
Start: 2022-02-13 | End: 2022-03-07 | Stop reason: SDUPTHER

## 2022-02-14 ENCOUNTER — PATIENT MESSAGE (OUTPATIENT)
Dept: FAMILY MEDICINE | Facility: CLINIC | Age: 82
End: 2022-02-14
Payer: MEDICARE

## 2022-02-14 NOTE — TELEPHONE ENCOUNTER
Please fax 2/9 note and labs to Dr. Valdivia cardiologist. Please request MD last office note. Thanks

## 2022-02-17 NOTE — TELEPHONE ENCOUNTER
"20 min conversation via speaker phone with both son and pt. Both ensured this NP did not request hospice eval without discussing with them first. Son reports pt cried after being told pt needs hospice and is eligible. They were relieved once educated. Both agreeable to Lulú Hernandez NP visiting and doing eval.  Lulú notified about conversation, health hx given and ensured NP cognition intact.     Son said these are things Xu the physical therapist said to them.  1- Janine NP requested HH eval pt for hospice.  2- Janine NP agreed pt is hospice appropriate  3- "heart attack level, go to ER" Son said no and gave extra coreg. Pt has chronic a-fib  4-/100 Xu PT said he could not do her therapy with a BP like that. Son edu on having cuff not loose, range is to narrow and no one could have that as a real BP. BP taken with home machine by son and not manual by PT.  5-Pt seen at [facility] dx with URI given abx. [facility] called 3 days later to report pos UTI culture and abx changed to cover both resp and uti. Xu PT said "you should never change/stop abx mid therapy. They should not have told you to do that.   6-Pt walked 8ft today with walker. 1st time. Xu PT said "wow I should have been getting you to walk with your walker a while ago vs chair exercises/leg lifts.    Lulú SALMERON and I discussed possibly changing therapists for home visits.  Pt is concerned her therapy is coming to an end as that is the impression they got from Xu.       "

## 2022-02-18 ENCOUNTER — TELEPHONE (OUTPATIENT)
Dept: FAMILY MEDICINE | Facility: CLINIC | Age: 82
End: 2022-02-18
Payer: MEDICARE

## 2022-02-18 ENCOUNTER — PATIENT MESSAGE (OUTPATIENT)
Dept: FAMILY MEDICINE | Facility: CLINIC | Age: 82
End: 2022-02-18
Payer: MEDICARE

## 2022-02-18 NOTE — TELEPHONE ENCOUNTER
----- Message from Roberta Simon sent at 2/18/2022 11:43 AM CST -----  Contact: Henna  Type: Needs Medical Advice  Who Called:  Henna Zavala with [facility]  Best Call Back Number: [phone]  Additional Information: Patient's son told Henna that FAVIOLA Mays wanted to speak to her. Please advise

## 2022-02-25 ENCOUNTER — TELEPHONE (OUTPATIENT)
Dept: FAMILY MEDICINE | Facility: CLINIC | Age: 82
End: 2022-02-25
Payer: MEDICARE

## 2022-03-01 ENCOUNTER — PATIENT MESSAGE (OUTPATIENT)
Dept: FAMILY MEDICINE | Facility: CLINIC | Age: 82
End: 2022-03-01
Payer: MEDICARE

## 2022-03-01 DIAGNOSIS — F33.2 SEVERE EPISODE OF RECURRENT MAJOR DEPRESSIVE DISORDER, WITHOUT PSYCHOTIC FEATURES: ICD-10-CM

## 2022-03-01 DIAGNOSIS — F33.1 MODERATE EPISODE OF RECURRENT MAJOR DEPRESSIVE DISORDER: Primary | ICD-10-CM

## 2022-03-02 NOTE — TELEPHONE ENCOUNTER
Spoke with patient. Notified patient of results. States with taking the diabetes medication, her glucose is getting too low.

## 2022-03-03 ENCOUNTER — TELEPHONE (OUTPATIENT)
Dept: FAMILY MEDICINE | Facility: CLINIC | Age: 82
End: 2022-03-03
Payer: MEDICARE

## 2022-03-03 DIAGNOSIS — R39.9 UTI SYMPTOMS: ICD-10-CM

## 2022-03-03 DIAGNOSIS — N39.0 FREQUENT UTI: Primary | ICD-10-CM

## 2022-03-03 NOTE — TELEPHONE ENCOUNTER
----- Message from Marilou Browning sent at 3/3/2022 11:45 AM CST -----  Type: Needs Medical Advice  Who Called:  Patient  Best Call Back Number:   Additional Information: Calling to notify that her medication was not sent to the pharmacy like it should have been.      [id number] - [place], [place] - [address] [place] [address] AT [place] [address] [address]  [address] [place] [id number]  Phone: [phone] Fax: [phone]

## 2022-03-03 NOTE — TELEPHONE ENCOUNTER
Contacted patient. Unknown person answered and told me to hang on. Call ended. Unable to speak with patient.

## 2022-03-03 NOTE — TELEPHONE ENCOUNTER
----- Message from Henna Solis sent at 3/3/2022 11:57 AM CST -----  Home Health Call or Physical Therapy    Caller would like to update provider on the patient and the visit they completed.     Caller: Henna   Office: [facility]  Orders: pts son said there were some labs that were supposed to be done at today's visit. No orders were received by .Please advise  Call Back Needed:   Call Back Number: [phone]  Additional Notes:

## 2022-03-03 NOTE — TELEPHONE ENCOUNTER
----- Message from Marilou Browning sent at 3/3/2022 11:45 AM CST -----  Type: Needs Medical Advice  Who Called:  Patient  Best Call Back Number:   Additional Information: Calling to notify that her medication was not sent to the pharmacy like it should have been.      [id number] - [place], [place] - [address] [place] [address] AT [address] [place] [address] [address]  [address] [place] [id number]  Phone: [phone] Fax: [phone]

## 2022-03-05 RX ORDER — OLANZAPINE 2.5 MG/1
2.5 TABLET ORAL NIGHTLY
Qty: 30 TABLET | Refills: 5 | Status: SHIPPED | OUTPATIENT
Start: 2022-03-05 | End: 2022-01-01

## 2022-03-06 ENCOUNTER — PATIENT MESSAGE (OUTPATIENT)
Dept: FAMILY MEDICINE | Facility: CLINIC | Age: 82
End: 2022-03-06
Payer: MEDICARE

## 2022-03-06 NOTE — TELEPHONE ENCOUNTER
Please let son and home health know she is not due for blood work but I did order a standing urinalysis and culture for HH to do anytime patient has UTI symptoms. HH is [facility]. Thanks

## 2022-03-07 DIAGNOSIS — E03.9 ACQUIRED HYPOTHYROIDISM: ICD-10-CM

## 2022-03-07 DIAGNOSIS — I47.10 PAROXYSMAL SVT (SUPRAVENTRICULAR TACHYCARDIA): ICD-10-CM

## 2022-03-07 NOTE — TELEPHONE ENCOUNTER
Pt is requesting refill on Synthroid 112mcg  Last refill 2/13/22  Please advise!        OLANZapine sent in on 3/5/22

## 2022-03-07 NOTE — TELEPHONE ENCOUNTER
----- Message from Alberto Nice sent at 3/7/2022  1:24 PM CST -----  Type: Needs Medical Advice  Who Called:  Drake Abdul (Son    Pharmacy name and phone #:    [id number] - [place], [place] - [address] AT [facility] [address]  [address]  [place] [address]  Phone: [phone] Fax: [phone]      Best Call Back Number: [phone]  Additional Information:Caller states that the patient's medication is too expensive and she needs a cheaper alternative called in:  Aripiprazole is too expensive and needs Risperidone or Olanzapine called in.    Caller states that the patient's refill is not at the office:  levothyroxine (SYNTHROID) 112 MCG tablet      Caller also states that the patient's Home Health orders for labs haven't called in.    Please call to advise

## 2022-03-08 RX ORDER — LEVOTHYROXINE SODIUM 112 UG/1
112 TABLET ORAL
Qty: 30 TABLET | Refills: 0 | Status: SHIPPED | OUTPATIENT
Start: 2022-03-08 | End: 2022-05-02

## 2022-03-09 ENCOUNTER — TELEPHONE (OUTPATIENT)
Dept: FAMILY MEDICINE | Facility: CLINIC | Age: 82
End: 2022-03-09
Payer: MEDICARE

## 2022-03-09 ENCOUNTER — PATIENT MESSAGE (OUTPATIENT)
Dept: FAMILY MEDICINE | Facility: CLINIC | Age: 82
End: 2022-03-09
Payer: MEDICARE

## 2022-03-09 ENCOUNTER — OFFICE VISIT (OUTPATIENT)
Dept: PODIATRY | Facility: CLINIC | Age: 82
End: 2022-03-09
Payer: MEDICARE

## 2022-03-09 VITALS
SYSTOLIC BLOOD PRESSURE: 125 MMHG | BODY MASS INDEX: 17.67 KG/M2 | WEIGHT: 90 LBS | DIASTOLIC BLOOD PRESSURE: 61 MMHG | HEIGHT: 60 IN | HEART RATE: 79 BPM

## 2022-03-09 DIAGNOSIS — E11.42 TYPE 2 DIABETES MELLITUS WITH DIABETIC POLYNEUROPATHY, WITHOUT LONG-TERM CURRENT USE OF INSULIN: Primary | ICD-10-CM

## 2022-03-09 DIAGNOSIS — E11.9 COMPREHENSIVE DIABETIC FOOT EXAMINATION, TYPE 2 DM, ENCOUNTER FOR: ICD-10-CM

## 2022-03-09 DIAGNOSIS — R82.79 POSITIVE URINE CULTURE: Primary | ICD-10-CM

## 2022-03-09 PROCEDURE — 99999 PR PBB SHADOW E&M-EST. PATIENT-LVL V: ICD-10-PCS | Mod: PBBFAC,,, | Performed by: PODIATRIST

## 2022-03-09 PROCEDURE — 99999 PR PBB SHADOW E&M-EST. PATIENT-LVL V: CPT | Mod: PBBFAC,,, | Performed by: PODIATRIST

## 2022-03-09 PROCEDURE — 99215 OFFICE O/P EST HI 40 MIN: CPT | Mod: PBBFAC | Performed by: PODIATRIST

## 2022-03-09 PROCEDURE — 99203 PR OFFICE/OUTPT VISIT, NEW, LEVL III, 30-44 MIN: ICD-10-PCS | Mod: S$PBB,,, | Performed by: PODIATRIST

## 2022-03-09 PROCEDURE — 99203 OFFICE O/P NEW LOW 30 MIN: CPT | Mod: S$PBB,,, | Performed by: PODIATRIST

## 2022-03-09 RX ORDER — ALPRAZOLAM 0.5 MG/1
0.5 TABLET ORAL
COMMUNITY
Start: 2021-10-04 | End: 2022-03-09 | Stop reason: ALTCHOICE

## 2022-03-09 RX ORDER — CARVEDILOL 3.12 MG/1
3.12 TABLET ORAL 2 TIMES DAILY WITH MEALS
COMMUNITY
End: 2022-01-01 | Stop reason: SDUPTHER

## 2022-03-09 RX ORDER — FLUCONAZOLE 100 MG/1
200 TABLET ORAL DAILY
Qty: 28 TABLET | Refills: 0 | Status: SHIPPED | OUTPATIENT
Start: 2022-03-09 | End: 2022-03-23

## 2022-03-09 RX ORDER — CARVEDILOL 6.25 MG/1
6.25 TABLET ORAL
COMMUNITY
Start: 2021-10-26 | End: 2022-03-09 | Stop reason: SDUPTHER

## 2022-03-09 RX ORDER — GABAPENTIN 100 MG/1
100 CAPSULE ORAL 3 TIMES DAILY
Qty: 90 CAPSULE | Refills: 2 | Status: SHIPPED | OUTPATIENT
Start: 2022-03-09 | End: 2022-01-01

## 2022-03-09 RX ORDER — PREGABALIN 25 MG/1
25 CAPSULE ORAL
COMMUNITY
Start: 2021-10-04 | End: 2022-03-09

## 2022-03-09 RX ORDER — DULAGLUTIDE 1.5 MG/.5ML
INJECTION, SOLUTION SUBCUTANEOUS
COMMUNITY
Start: 2021-04-15 | End: 2022-04-11

## 2022-03-09 RX ORDER — INSULIN DEGLUDEC 100 U/ML
10 INJECTION, SOLUTION SUBCUTANEOUS EVERY MORNING
COMMUNITY
Start: 2021-04-15

## 2022-03-09 NOTE — TELEPHONE ENCOUNTER
LVM asking son to call and also LVM to let him know to check portal messages. I will continue kylah try and contact son to inform himof Janine's note about patient's health. X2

## 2022-03-09 NOTE — TELEPHONE ENCOUNTER
Please call son and let him know urine culture grew yeast. I talked with urology and they recommended diflucan 200 mg (2 tabs) qd x 14 days and a renal US thus both ordered.  Also portal message sent to son about low sugar yet not read. Thanks

## 2022-03-09 NOTE — TELEPHONE ENCOUNTER
Please ask Son if patient has a Urologist and who is it? Her urine culture resulted with yeast and I do not think this has been treated. Secondly, only 1 mo of synthroid sent as she will need her thyroid lab done around 4/9/22 which have been ordered and YOSEPH Fat[facility] in home can do.   Thanks

## 2022-03-10 ENCOUNTER — PATIENT MESSAGE (OUTPATIENT)
Dept: FAMILY MEDICINE | Facility: CLINIC | Age: 82
End: 2022-03-10
Payer: MEDICARE

## 2022-03-10 NOTE — TELEPHONE ENCOUNTER
Spoke via portal to patient/Son and stated patient does not have a urologist and would like you to refer one. The last urologist she saw was in 2013. Please advise Thank you

## 2022-03-12 PROBLEM — E11.9 COMPREHENSIVE DIABETIC FOOT EXAMINATION, TYPE 2 DM, ENCOUNTER FOR: Status: ACTIVE | Noted: 2022-03-12

## 2022-03-12 NOTE — PROGRESS NOTES
Subjective:       Patient ID: Maliha Jaramillo is a 81 y.o. female.    Chief Complaint: Nail Problem, Foot Swelling, Foot Pain, Wound Check, and Diabetes Mellitus  Patient presents today for a new patient diabetic evaluation.  Patient states she has been a diabetic for 4 years however she has had neuropathy for about 8-9 years.  Patient relates a history of previous heel ulcer that subsequently resolved and healed in October of last year she states that she ambulates very little and is in a wheelchair most of the time.  Patient presents with her family member today.    Past Medical History:   Diagnosis Date    Adenocarcinoma of left lung     Anxiety 1954    Arthritis 2013    Cancer     Adenocarcinoma of left lung    Diabetes mellitus     Hyperlipidemia 2012    Hypertension     Hypothyroidism (acquired) 2004    Osteoporosis 07/2019    Paroxysmal A-fib     Tachycardia      Past Surgical History:   Procedure Laterality Date    APPENDECTOMY  1948    BREAST BIOPSY Right 1980's    benign    EPIDURAL STEROID INJECTION N/A 2/25/2021    Procedure: Injection, Steroid, Epidural L4-L5;  Surgeon: Dereck Cervantes MD;  Location: [facility] OR;  Service: Pain Management;  Laterality: N/A;  oral    EPIDURAL STEROID INJECTION N/A 9/8/2021    Procedure: Injection, Steroid, Epidural;  Surgeon: Dereck Cervantes MD;  Location: [facility] OR;  Service: Pain Management;  Laterality: N/A;  L4-L5     EPIDURAL STEROID INJECTION N/A 9/15/2021    Procedure: Injection, Steroid, Epidural;  Surgeon: Dereck Cervantes MD;  Location: [facility] OR;  Service: Pain Management;  Laterality: N/A;  L4-L5     EPIDURAL STEROID INJECTION INTO LUMBAR SPINE N/A 11/16/2020    Procedure: Injection-steroid-epidural-lumbar;  Surgeon: Dereck Cervantes MD;  Location: [facility] OR;  Service: Pain Management;  Laterality: N/A;  L4-L5    ESOPHAGOGASTRODUODENOSCOPY N/A 6/25/2020    Procedure: ESOPHAGOGASTRODUODENOSCOPY (EGD);  Surgeon: Ruben Adame MD;  Location: [facility]  ENDO;  Service: General;  Laterality: N/A;    EYE SURGERY      HYSTERECTOMY  1978    TONSILLECTOMY  1945    TOTAL REDUCTION MAMMOPLASTY Bilateral 1987    TUBAL LIGATION       Family History   Problem Relation Age of Onset    Cancer Sister         Adenocarcinoma    Stroke Father      Social History     Socioeconomic History    Marital status:    Tobacco Use    Smoking status: Former Smoker     Packs/day: 0.00     Years: 0.00     Pack years: 0.00    Smokeless tobacco: Never Used   Substance and Sexual Activity    Alcohol use: Yes     Alcohol/week: 1.0 standard drink     Types: 1 Standard drinks or equivalent per week     Comment: Couple of drinks per month    Drug use: No    Sexual activity: Not Currently     Partners: Male     Birth control/protection: Condom, Post-menopausal, Spermicide       Current Outpatient Medications   Medication Sig Dispense Refill    ALPRAZolam (XANAX) 0.25 MG tablet Take 1 tablet (0.25 mg total) by mouth nightly as needed for Insomnia. 30 tablet 2    blood sugar diagnostic Strp To check BG 2 times daily, to use with insurance preferred meter 200 each 1    blood-glucose meter kit To check BG 2 times daily, to use with insurance preferred meter 1 each 3    carvediloL (COREG) 3.125 MG tablet Take 3.125 mg by mouth 2 (two) times daily with meals.      diltiaZEM (CARDIZEM LA) 180 mg 24 hr tablet Take 1 tablet (180 mg total) by mouth once daily. 90 tablet 1    dulaglutide (TRULICITY) 1.5 mg/0.5 mL pen injector   INJECT 1 SYRINGE (1.5 MG) SUBCUTANEOUSLY ONCE A WEEK      dulaglutide (TRULICITY) 3 mg/0.5 mL pen injector Inject 3 mg into the skin every 7 days. 4 pen 11    furosemide (LASIX) 40 MG tablet Take 1 tablet (40 mg total) by mouth once daily. 30 tablet 5    imipramine (TOFRANIL) 50 MG tablet TAKE 1 TABLET BY MOUTH ONCE DAILY IN THE EVENING 90 tablet 0    insulin degludec (TRESIBA FLEXTOUCH U-100) 100 unit/mL (3 mL) insulin pen   20 unit, SUBQ, Daily, 0  "Refill(s), Maintenance      lancets Misc To check BG 2 times daily, to use with insurance preferred meter 200 each 1    levothyroxine (SYNTHROID) 112 MCG tablet Take 1 tablet (112 mcg total) by mouth before breakfast. 30 tablet 0    multivit-min/ferrous fumarate (MULTI VITAMIN ORAL)   1 tab, Oral, Daily, 0 Refill(s)      nitrofurantoin (MACRODANTIN) 50 MG capsule Take 1 capsule (50 mg total) by mouth every evening. 90 capsule 1    nystatin (MYCOSTATIN) powder Apply topically 4 (four) times daily. 60 g 2    OLANZapine (ZYPREXA) 2.5 MG tablet Take 1 tablet (2.5 mg total) by mouth every evening. 30 tablet 5    paroxetine (PAXIL) 10 MG tablet Take 1 tablet (10 mg total) by mouth every evening. 90 tablet 1    pen needle, diabetic (1ST TIER UNIFINE PENTIPS) 32 gauge x 5/32" Ndle 1 Device by Misc.(Non-Drug; Combo Route) route every evening. 200 each 4    potassium chloride (MICRO-K) 10 MEQ CpSR Take 1 capsule (10 mEq total) by mouth 2 (two) times daily. 180 capsule 1    rivaroxaban (XARELTO) 15 mg Tab 15 mg.      SANTYL ointment Apply topically.      fluconazole (DIFLUCAN) 100 MG tablet Take 2 tablets (200 mg total) by mouth once daily. for 14 days 28 tablet 0    gabapentin (NEURONTIN) 100 MG capsule Take 1 capsule (100 mg total) by mouth 3 (three) times daily. 90 capsule 2     No current facility-administered medications for this visit.     Review of patient's allergies indicates:   Allergen Reactions    Flu vaccine [id number](5 yr up) Anaphylaxis     Patient's left side of the face is severely swollen on the left side then became unresponsive.     Pneumococcal vaccine Anaphylaxis     Patient's left side of the face is severely swollen on the left side then became unresponsive. Had flu shot same day.     Ciprofloxacin Rash    Ace inhibitors Other (See Comments)     cough       Review of Systems   Musculoskeletal: Positive for arthralgias and gait problem.   Neurological: Positive for numbness.   All other " systems reviewed and are negative.      Objective:      Vitals:    03/09/22 1440   BP: 125/61   Pulse: 79   Weight: 40.8 kg (90 lb)   Height: 5' (1.524 m)     Physical Exam  Vitals and nursing note reviewed.   Constitutional:       Appearance: Normal appearance.   Cardiovascular:      Pulses:           Dorsalis pedis pulses are 0 on the right side and 0 on the left side.        Posterior tibial pulses are 0 on the right side and 0 on the left side.   Pulmonary:      Effort: Pulmonary effort is normal.   Musculoskeletal:         General: Deformity present.   Feet:      Right foot:      Protective Sensation: 4 sites tested. 1 site sensed.     Skin integrity: Ulcer, callus and dry skin present.      Toenail Condition: Right toenails are abnormally thick, long and ingrown. Fungal disease present.     Left foot:      Protective Sensation: 4 sites tested. 1 site sensed.     Skin integrity: Dry skin present.      Toenail Condition: Left toenails are abnormally thick, long and ingrown. Fungal disease present.  Skin:     Capillary Refill: Capillary refill takes more than 3 seconds.      Findings: Lesion present.   Neurological:      Mental Status: She is alert.      Sensory: Sensory deficit present.   Psychiatric:         Mood and Affect: Mood normal.         Behavior: Behavior normal.         Thought Content: Thought content normal.         Judgment: Judgment normal.                                                      Assessment:       1. Type 2 diabetes mellitus with diabetic polyneuropathy, without long-term current use of insulin    2. Comprehensive diabetic foot examination, type 2 DM, encounter for    3. Uncontrolled type 2 DM with peripheral circulatory disorder        Plan:       Patient presents today for a new patient diabetic evaluation.  Patient states she has been a diabetic for 4 years however she has had neuropathy for about 8-9 years.  Patient relates a history of previous heel ulcer that subsequently  resolved and healed in October of last year she states that she ambulates very little and is in a wheelchair most of the time.  Patient presents with her family member today.  A comprehensive new patient diabetic evaluation was performed today patient does have obvious signs of peripheral vascular disease pulses were nonpalpable bilateral skin temperature is cool to cold from the anterior tibia to the distal digits bilateral.  Patient has significant diabetic related neuropathy she states she had been on Lyrica but had an issue taking the Lyrica and was taking off of it about a year ago she is not sure that the Lyrica was really helping her very much either.  Patient indicated today that she had never tried gabapentin she is having a lot of nerve discomfort in both lower extremities I recommended starting her on a low dose of gabapentin 100 mg 3 times a day and see how well this controls the patient's nerve related symptoms.  Patient was advised we can always adjust the dose as necessary.  Patient states she does have a lot of discomfort in both lower extremities and feet related to her neuropathy and the constant numbness as well as swelling in her feet.  Patient also had several ingrowing nails today secondary to fungal involvement I did debride the nails the patient did not require a nail avulsion at this time and she indicated that they actually felt better once the ingrowing toenail was removed.  I do recommended follow-up in every 4-6 months unless the patient has any problems questions or concerns sooner I did advised the patient and the patient's family member they need to monitor her left heel very closely she does have heel protectors at home they need to make sure that they keep this padded protected this could easily ulcerate again as she does have dry callus and scar tissue over the area of previously noted wound.  Patient was advised to contact us with any problems questions or concerns prior to  regularly scheduled appointment.  Patient was advised it would be helpful to keep her skin well moisturized and hydrated over the area of previously noted heel ulcer.  This note was created using [employer] voice recognition software that occasionally misinterpreted phrases or words.

## 2022-03-21 ENCOUNTER — TELEPHONE (OUTPATIENT)
Dept: FAMILY MEDICINE | Facility: CLINIC | Age: 82
End: 2022-03-21
Payer: MEDICARE

## 2022-03-21 ENCOUNTER — HOSPITAL ENCOUNTER (OUTPATIENT)
Dept: RADIOLOGY | Facility: HOSPITAL | Age: 82
Discharge: HOME | End: 2022-03-21
Attending: NURSE PRACTITIONER
Payer: MEDICARE

## 2022-03-21 DIAGNOSIS — R82.79 POSITIVE URINE CULTURE: ICD-10-CM

## 2022-03-21 PROCEDURE — 76770 US RETROPERITONEAL COMPLETE: ICD-10-PCS | Mod: 26,,, | Performed by: RADIOLOGY

## 2022-03-21 PROCEDURE — 76770 US EXAM ABDO BACK WALL COMP: CPT | Mod: TC

## 2022-03-21 PROCEDURE — 76770 US EXAM ABDO BACK WALL COMP: CPT | Mod: 26,,, | Performed by: RADIOLOGY

## 2022-03-21 NOTE — TELEPHONE ENCOUNTER
----- Message from Marbella Carrion sent at 3/21/2022 11:18 AM CDT -----  Regarding: MEDICINE  Pt would like to know if she can take her medicine morning and night, phone #: [phone]. Would also like to talk about making an appointment for congestion or prescribing a medicine.

## 2022-03-22 ENCOUNTER — OFFICE VISIT (OUTPATIENT)
Dept: PAIN MEDICINE | Facility: CLINIC | Age: 82
End: 2022-03-22
Payer: MEDICARE

## 2022-03-22 ENCOUNTER — PATIENT MESSAGE (OUTPATIENT)
Dept: FAMILY MEDICINE | Facility: CLINIC | Age: 82
End: 2022-03-22
Payer: MEDICARE

## 2022-03-22 VITALS
WEIGHT: 90 LBS | BODY MASS INDEX: 17.67 KG/M2 | HEART RATE: 87 BPM | HEIGHT: 60 IN | SYSTOLIC BLOOD PRESSURE: 129 MMHG | DIASTOLIC BLOOD PRESSURE: 68 MMHG

## 2022-03-22 DIAGNOSIS — M48.00 CENTRAL STENOSIS OF SPINAL CANAL: ICD-10-CM

## 2022-03-22 DIAGNOSIS — M47.896 OTHER SPONDYLOSIS, LUMBAR REGION: Primary | ICD-10-CM

## 2022-03-22 DIAGNOSIS — M51.36 DDD (DEGENERATIVE DISC DISEASE), LUMBAR: ICD-10-CM

## 2022-03-22 DIAGNOSIS — Z01.818 PRE-OP TESTING: ICD-10-CM

## 2022-03-22 PROCEDURE — 99999 PR PBB SHADOW E&M-EST. PATIENT-LVL IV: CPT | Mod: PBBFAC,,, | Performed by: ANESTHESIOLOGY

## 2022-03-22 PROCEDURE — 99214 OFFICE O/P EST MOD 30 MIN: CPT | Mod: S$PBB,CS,, | Performed by: ANESTHESIOLOGY

## 2022-03-22 PROCEDURE — 99214 PR OFFICE/OUTPT VISIT, EST, LEVL IV, 30-39 MIN: ICD-10-PCS | Mod: S$PBB,CS,, | Performed by: ANESTHESIOLOGY

## 2022-03-22 PROCEDURE — 99214 OFFICE O/P EST MOD 30 MIN: CPT | Mod: PBBFAC,PN | Performed by: ANESTHESIOLOGY

## 2022-03-22 PROCEDURE — 99999 PR PBB SHADOW E&M-EST. PATIENT-LVL IV: ICD-10-PCS | Mod: PBBFAC,,, | Performed by: ANESTHESIOLOGY

## 2022-03-22 NOTE — PROGRESS NOTES
FOLLOW UP NOTE:     CHIEF COMPLAINT: back pain    INITIAL HISTORY OF PRESENT ILLNESS: Maliha Jaramillo is a 79 y.o. female with PMH significant for chronic Xanax usage, DM II, HTN, and atrial fibrillation (not on anticoagulation other than ASA) presents as a referral for the evaluation of left sided back pain. The patient reports that her pain began approximately in September 2020 while doing yardwork and she fell and could not get up. Of note, the patient's son reports that the patient falls frequently, and she has fallen multiple times since her MRI in September 2020. The patient localizes her pain to her left buttock. The patient reports of radiation down the lateral aspect of her LLE to her ankle. The patient describes her pain as a sharp type of pain. The patient reports of numbness in her feet. The patient reports that her pain is a 5/10. Patient denies of any urinary/fecal incontinence or saddle anesthesia. The patient reports of generalized weakness throughout her entire body.      Aggravating factors: constant pain; walking; laying in bed     Mitigating factors: rest    INTERVAL HISTORY OF PRESENT ILLNESS: Maliha Jaramillo is a 81 y.o. female with PMH significant for chronic Xanax usage, DM II, HTN, and atrial fibrillation (on Xarelto) presents as an established patient for the continued management of back pain. Today, the patient reports that her worst pain is in her lower back and tailbone region. The patient reports of radiation down her BLE's. The patient reports that her pain is rather severe and makes it difficult for her to stand. She reports that she is unable to fully participate in PT secondary to severe pain. The patient reports that she was unable to get an ROMEO secondary to having to take Xarelto. The patient denies of any significant changes in her health since her last appointment. The patient also denies of any changes in the character of her pain since her last appointment. The patient  reports that her current pain is a 9/10. Patient denies of any urinary/fecal incontinence, saddle anesthesia, or new weakness.     INTERVENTIONAL PAIN HISTORY:  12/10/2021: Bilateral knee viscosupplementation  9/15/2021: Lumbar interlaminar epidural steroid injection at L4-L5  2/25/2021: Lumbar interlaminar epidural steroid injection at L4-L5 - 90% benefit per chart review  11/16/2020:  Lumbar interlaminar epidural steroid injection at L4-L5 under fluoroscopic guidance (left paramedian approach) - good relief     CURRENT PAIN MEDICATIONS:  horse lineament (some benefit)  Xanax BID    Xarelto    Previously tried:   lyrica 25 mg PO BID        ROS:  Review of Systems   Constitutional: Negative for chills and fever.   HENT: Negative for sore throat.    Eyes: Negative for visual disturbance.   Respiratory: Negative for shortness of breath.    Cardiovascular: Negative for chest pain.   Gastrointestinal: Negative for nausea and vomiting.   Genitourinary: Negative for difficulty urinating.   Musculoskeletal: Positive for arthralgias, back pain and gait problem.   Skin: Negative for rash.   Allergic/Immunologic: Negative for immunocompromised state.   Neurological: Negative for syncope.   Hematological: Does not bruise/bleed easily.   Psychiatric/Behavioral: Negative for suicidal ideas.        MEDICAL, SURGICAL, FAMILY, SOCIAL HX: reviewed    MEDICATIONS/ALLERGIES: reviewed    PHYSICAL EXAM:    VITALS: Vitals reviewed.   Vitals:    03/22/22 1035   BP: 129/68   Pulse: 87   Weight: 40.8 kg (90 lb)   Height: 5' (1.524 m)   PainSc:   8   PainLoc: Knee       Physical Exam  Vitals and nursing note reviewed.   Constitutional:       Appearance: She is not diaphoretic.   HENT:      Head: Normocephalic and atraumatic.   Eyes:      General:         Right eye: No discharge.         Left eye: No discharge.      Conjunctiva/sclera: Conjunctivae normal.   Cardiovascular:      Rate and Rhythm: Normal rate.   Pulmonary:      Effort:  Pulmonary effort is normal. No respiratory distress.      Breath sounds: Normal breath sounds.   Abdominal:      Palpations: Abdomen is soft.   Skin:     General: Skin is warm and dry.      Findings: No rash.   Neurological:      Mental Status: She is alert and oriented to person, place, and time.   Psychiatric:         Mood and Affect: Mood and affect normal.         Cognition and Memory: Memory normal.         Judgment: Judgment normal.        UPPER EXTREMITIES: Normal alignment, normal range of motion, no atrophy, no skin changes,  hair growth and nail growth normal and equal bilaterally. No swelling, no tenderness.    LOWER EXTREMITIES:  Normal alignment, normal range of motion, no atrophy, no skin changes,  hair growth and nail growth normal and equal bilaterally. No swelling, no tenderness.     LUMBAR SPINE  Lumbar spine: ROM is limited with flexion extension and oblique extension with increased pain with passive ROM.    Supine straight leg raise: unable to examine secondary to significant fall risk    ((--)) Facet loading   Myofascial exam: Tenderness to palpation across lumbar paraspinous muscles.      MOTOR: Tone and bulk: normal bilateral upper and lower Strength: normal   Delt      Bi         Tri        WE      WF                        R          5          5          5          5          5          5            L          5          5          5          5          5          5               IP         ADD     ABD     Quad   TA        Gas      HAM  R          5          5          5          5          5          5          5  L          5          5          5          5          5          5          5     SENSATION: Light touch and pinprick intact bilaterally  REFLEXES: normal, symmetric, nonbrisk.  Toes down, no clonus. Negative solis's sign bilaterally.  GAIT: antalgic gait; uses a wheelchair for assistance with ambulation    IMAGING: no new spine imaging to review    ASSESSMENT: [place]  Ella is a 81 y.o. female with PMH significant for chronic Xanax usage, DM II, HTN, and atrial fibrillation (on Xarelto) presents as an established patient for the continued management of back pain. Today, the patient reports that her worst pain is in her lower back and tailbone region with radiation down her BLE's. I believe facet arthropathy is contributing to the patient's current pain to some degree. In addition, treatment of the patient's facetogenic pain has the added benefit of allowing the patient to remain on her anticoagulation. Treatment plan outlined below.     PLAN:  1. I believe her low back pain maybe due to facet arthropathy and have recommended lumbar medial branch blocks as a diagnostic procedure.  If successful, would proceed with radiofrequency ablation. Schedule for bilateral L2, L3, L4, and L5 medial branch blocks.   2. I have stressed the importance of physical activity and a home exercise plan to help with chronic pain and improve health.  3. Can offer repeat ROMEO's in the future PRN  4. RTC for the procedure as outlined above    Dereck Cervantes MD  Pain Management

## 2022-03-22 NOTE — H&P (VIEW-ONLY)
FOLLOW UP NOTE:     CHIEF COMPLAINT: back pain    INITIAL HISTORY OF PRESENT ILLNESS: Maliha Jaramillo is a 79 y.o. female with PMH significant for chronic Xanax usage, DM II, HTN, and atrial fibrillation (not on anticoagulation other than ASA) presents as a referral for the evaluation of left sided back pain. The patient reports that her pain began approximately in September 2020 while doing yardwork and she fell and could not get up. Of note, the patient's son reports that the patient falls frequently, and she has fallen multiple times since her MRI in September 2020. The patient localizes her pain to her left buttock. The patient reports of radiation down the lateral aspect of her LLE to her ankle. The patient describes her pain as a sharp type of pain. The patient reports of numbness in her feet. The patient reports that her pain is a 5/10. Patient denies of any urinary/fecal incontinence or saddle anesthesia. The patient reports of generalized weakness throughout her entire body.      Aggravating factors: constant pain; walking; laying in bed     Mitigating factors: rest    INTERVAL HISTORY OF PRESENT ILLNESS: Maliha Jaramillo is a 81 y.o. female with PMH significant for chronic Xanax usage, DM II, HTN, and atrial fibrillation (on Xarelto) presents as an established patient for the continued management of back pain. Today, the patient reports that her worst pain is in her lower back and tailbone region. The patient reports of radiation down her BLE's. The patient reports that her pain is rather severe and makes it difficult for her to stand. She reports that she is unable to fully participate in PT secondary to severe pain. The patient reports that she was unable to get an ROEMO secondary to having to take Xarelto. The patient denies of any significant changes in her health since her last appointment. The patient also denies of any changes in the character of her pain since her last appointment. The patient  reports that her current pain is a 9/10. Patient denies of any urinary/fecal incontinence, saddle anesthesia, or new weakness.     INTERVENTIONAL PAIN HISTORY:  12/10/2021: Bilateral knee viscosupplementation  9/15/2021: Lumbar interlaminar epidural steroid injection at L4-L5  2/25/2021: Lumbar interlaminar epidural steroid injection at L4-L5 - 90% benefit per chart review  11/16/2020:  Lumbar interlaminar epidural steroid injection at L4-L5 under fluoroscopic guidance (left paramedian approach) - good relief     CURRENT PAIN MEDICATIONS:  horse lineament (some benefit)  Xanax BID    Xarelto    Previously tried:   lyrica 25 mg PO BID        ROS:  Review of Systems   Constitutional: Negative for chills and fever.   HENT: Negative for sore throat.    Eyes: Negative for visual disturbance.   Respiratory: Negative for shortness of breath.    Cardiovascular: Negative for chest pain.   Gastrointestinal: Negative for nausea and vomiting.   Genitourinary: Negative for difficulty urinating.   Musculoskeletal: Positive for arthralgias, back pain and gait problem.   Skin: Negative for rash.   Allergic/Immunologic: Negative for immunocompromised state.   Neurological: Negative for syncope.   Hematological: Does not bruise/bleed easily.   Psychiatric/Behavioral: Negative for suicidal ideas.        MEDICAL, SURGICAL, FAMILY, SOCIAL HX: reviewed    MEDICATIONS/ALLERGIES: reviewed    PHYSICAL EXAM:    VITALS: Vitals reviewed.   Vitals:    03/22/22 1035   BP: 129/68   Pulse: 87   Weight: 40.8 kg (90 lb)   Height: 5' (1.524 m)   PainSc:   8   PainLoc: Knee       Physical Exam  Vitals and nursing note reviewed.   Constitutional:       Appearance: She is not diaphoretic.   HENT:      Head: Normocephalic and atraumatic.   Eyes:      General:         Right eye: No discharge.         Left eye: No discharge.      Conjunctiva/sclera: Conjunctivae normal.   Cardiovascular:      Rate and Rhythm: Normal rate.   Pulmonary:      Effort:  Pulmonary effort is normal. No respiratory distress.      Breath sounds: Normal breath sounds.   Abdominal:      Palpations: Abdomen is soft.   Skin:     General: Skin is warm and dry.      Findings: No rash.   Neurological:      Mental Status: She is alert and oriented to person, place, and time.   Psychiatric:         Mood and Affect: Mood and affect normal.         Cognition and Memory: Memory normal.         Judgment: Judgment normal.        UPPER EXTREMITIES: Normal alignment, normal range of motion, no atrophy, no skin changes,  hair growth and nail growth normal and equal bilaterally. No swelling, no tenderness.    LOWER EXTREMITIES:  Normal alignment, normal range of motion, no atrophy, no skin changes,  hair growth and nail growth normal and equal bilaterally. No swelling, no tenderness.     LUMBAR SPINE  Lumbar spine: ROM is limited with flexion extension and oblique extension with increased pain with passive ROM.    Supine straight leg raise: unable to examine secondary to significant fall risk    ((--)) Facet loading   Myofascial exam: Tenderness to palpation across lumbar paraspinous muscles.      MOTOR: Tone and bulk: normal bilateral upper and lower Strength: normal   Delt      Bi         Tri        WE      WF                        R          5          5          5          5          5          5            L          5          5          5          5          5          5               IP         ADD     ABD     Quad   TA        Gas      HAM  R          5          5          5          5          5          5          5  L          5          5          5          5          5          5          5     SENSATION: Light touch and pinprick intact bilaterally  REFLEXES: normal, symmetric, nonbrisk.  Toes down, no clonus. Negative solis's sign bilaterally.  GAIT: antalgic gait; uses a wheelchair for assistance with ambulation    IMAGING: no new spine imaging to review    ASSESSMENT: [place]  Ella is a 81 y.o. female with PMH significant for chronic Xanax usage, DM II, HTN, and atrial fibrillation (on Xarelto) presents as an established patient for the continued management of back pain. Today, the patient reports that her worst pain is in her lower back and tailbone region with radiation down her BLE's. I believe facet arthropathy is contributing to the patient's current pain to some degree. In addition, treatment of the patient's facetogenic pain has the added benefit of allowing the patient to remain on her anticoagulation. Treatment plan outlined below.     PLAN:  1. I believe her low back pain maybe due to facet arthropathy and have recommended lumbar medial branch blocks as a diagnostic procedure.  If successful, would proceed with radiofrequency ablation. Schedule for bilateral L2, L3, L4, and L5 medial branch blocks.   2. I have stressed the importance of physical activity and a home exercise plan to help with chronic pain and improve health.  3. Can offer repeat ROMEO's in the future PRN  4. RTC for the procedure as outlined above    Dereck Cervantes MD  Pain Management

## 2022-03-27 ENCOUNTER — LAB VISIT (OUTPATIENT)
Dept: FAMILY MEDICINE | Facility: CLINIC | Age: 82
End: 2022-03-27
Payer: MEDICARE

## 2022-03-27 DIAGNOSIS — Z01.818 PRE-OP TESTING: ICD-10-CM

## 2022-03-27 PROCEDURE — U0003 INFECTIOUS AGENT DETECTION BY NUCLEIC ACID (DNA OR RNA); SEVERE ACUTE RESPIRATORY SYNDROME CORONAVIRUS 2 (SARS-COV-2) (CORONAVIRUS DISEASE [COVID-19]), AMPLIFIED PROBE TECHNIQUE, MAKING USE OF HIGH THROUGHPUT TECHNOLOGIES AS DESCRIBED BY CMS-2020-01-R: HCPCS | Performed by: ANESTHESIOLOGY

## 2022-03-27 PROCEDURE — U0005 INFEC AGEN DETEC AMPLI PROBE: HCPCS | Performed by: ANESTHESIOLOGY

## 2022-03-28 LAB
SARS-COV-2 RNA RESP QL NAA+PROBE: NOT DETECTED
SARS-COV-2- CYCLE NUMBER: NORMAL

## 2022-03-30 ENCOUNTER — HOSPITAL ENCOUNTER (OUTPATIENT)
Facility: HOSPITAL | Age: 82
Discharge: HOME | End: 2022-03-30
Attending: ANESTHESIOLOGY | Admitting: ANESTHESIOLOGY
Payer: MEDICARE

## 2022-03-30 ENCOUNTER — DOCUMENT SCAN (OUTPATIENT)
Dept: HOME HEALTH SERVICES | Facility: HOSPITAL | Age: 82
End: 2022-03-30
Payer: MEDICARE

## 2022-03-30 ENCOUNTER — TELEPHONE (OUTPATIENT)
Dept: FAMILY MEDICINE | Facility: CLINIC | Age: 82
End: 2022-03-30

## 2022-03-30 VITALS
TEMPERATURE: 98 F | HEIGHT: 60 IN | DIASTOLIC BLOOD PRESSURE: 61 MMHG | HEART RATE: 96 BPM | WEIGHT: 85 LBS | OXYGEN SATURATION: 96 % | RESPIRATION RATE: 18 BRPM | BODY MASS INDEX: 16.69 KG/M2 | SYSTOLIC BLOOD PRESSURE: 125 MMHG

## 2022-03-30 DIAGNOSIS — M47.896 OTHER SPONDYLOSIS, LUMBAR REGION: Primary | ICD-10-CM

## 2022-03-30 DIAGNOSIS — M51.36 DEGENERATIVE DISC DISEASE, LUMBAR: ICD-10-CM

## 2022-03-30 PROCEDURE — 64494 INJ PARAVERT F JNT L/S 2 LEV: CPT | Mod: 50 | Performed by: ANESTHESIOLOGY

## 2022-03-30 PROCEDURE — 25000003 PHARM REV CODE 250: Performed by: ANESTHESIOLOGY

## 2022-03-30 PROCEDURE — 63600175 PHARM REV CODE 636 W HCPCS: Performed by: ANESTHESIOLOGY

## 2022-03-30 PROCEDURE — 64493 INJ PARAVERT F JNT L/S 1 LEV: CPT | Mod: 50,,, | Performed by: ANESTHESIOLOGY

## 2022-03-30 PROCEDURE — 64493 INJ PARAVERT F JNT L/S 1 LEV: CPT | Mod: 50 | Performed by: ANESTHESIOLOGY

## 2022-03-30 PROCEDURE — 64495 INJ PARAVERT F JNT L/S 3 LEV: CPT | Mod: 50 | Performed by: ANESTHESIOLOGY

## 2022-03-30 PROCEDURE — 64495 PR INJ DX/THER AGNT PARAVERT FACET JOINT,IMG GUIDE,LUMBAR/SAC, ADD LEVEL: ICD-10-PCS | Mod: 50,,, | Performed by: ANESTHESIOLOGY

## 2022-03-30 PROCEDURE — 64494 INJ PARAVERT F JNT L/S 2 LEV: CPT | Mod: 50,,, | Performed by: ANESTHESIOLOGY

## 2022-03-30 PROCEDURE — 64495 INJ PARAVERT F JNT L/S 3 LEV: CPT | Mod: 50,,, | Performed by: ANESTHESIOLOGY

## 2022-03-30 PROCEDURE — 64494 PR INJ DX/THER AGNT PARAVERT FACET JOINT,IMG GUIDE,LUMBAR/SAC, 2ND LEVEL: ICD-10-PCS | Mod: 50,,, | Performed by: ANESTHESIOLOGY

## 2022-03-30 PROCEDURE — 64493 PR INJ DX/THER AGNT PARAVERT FACET JOINT,IMG GUIDE,LUMBAR/SAC,1ST LVL: ICD-10-PCS | Mod: 50,,, | Performed by: ANESTHESIOLOGY

## 2022-03-30 RX ORDER — ROPIVACAINE HYDROCHLORIDE 5 MG/ML
INJECTION, SOLUTION EPIDURAL; INFILTRATION; PERINEURAL
Status: DISCONTINUED | OUTPATIENT
Start: 2022-03-30 | End: 2022-03-30 | Stop reason: HOSPADM

## 2022-03-30 RX ORDER — LIDOCAINE HYDROCHLORIDE 10 MG/ML
INJECTION INFILTRATION; PERINEURAL
Status: DISCONTINUED | OUTPATIENT
Start: 2022-03-30 | End: 2022-03-30 | Stop reason: HOSPADM

## 2022-03-30 RX ORDER — SODIUM CHLORIDE, SODIUM LACTATE, POTASSIUM CHLORIDE, CALCIUM CHLORIDE 600; 310; 30; 20 MG/100ML; MG/100ML; MG/100ML; MG/100ML
INJECTION, SOLUTION INTRAVENOUS ONCE AS NEEDED
Status: ACTIVE | OUTPATIENT
Start: 2022-03-30 | End: 2033-08-26

## 2022-03-30 NOTE — DISCHARGE SUMMARY
[facility] Surgery  Discharge Note  Short Stay    Procedure(s) (LRB):  Block, Nerve Medial Branch L2,L3,L4,L5 (Bilateral)    OUTCOME: Patient tolerated treatment/procedure well without complication and is now ready for discharge.    DISPOSITION: Home or Self Care    FINAL DIAGNOSIS:  Other spondylosis, lumbar    FOLLOWUP: In clinic    DISCHARGE INSTRUCTIONS:    Discharge Procedure Orders   Diet general     Call MD for:  temperature >100.4     Call MD for:  persistent nausea and vomiting     Call MD for:  severe uncontrolled pain     Call MD for:  difficulty breathing, headache or visual disturbances     Call MD for:  redness, tenderness, or signs of infection (pain, swelling, redness, odor or green/yellow discharge around incision site)     Call MD for:  hives     Call MD for:  persistent dizziness or light-headedness     Call MD for:  extreme fatigue        TIME SPENT ON DISCHARGE: 15 minutes

## 2022-03-30 NOTE — INTERVAL H&P NOTE
The patient has been examined and the H&P has been reviewed:    I concur with the findings and no changes have occurred since H&P was written.    This patient has been cleared for surgery in an ambulatory surgical facility    ASA 3,  Mallampatti Score 3  No history of anesthetic complications  Plan for local anesthesia only    Surgery risks, benefits and alternative options discussed and understood by patient/family.          There are no hospital problems to display for this patient.

## 2022-03-30 NOTE — TELEPHONE ENCOUNTER
Received feedback from [facility] Lulú SALMERON recommending patient take a xanax 45 min-1hr prior to PT as PT feels patients ability to participate is hindered by her anxiety and son's presence thus son agreeable to give xanax and leave the room while pt obtaining therapy to see if she progresses.  Lulú SALMERON notified.

## 2022-03-30 NOTE — OP NOTE
PROCEDURE DATE: 3/30/2022    PROCEDURE:  Bilateral L2, L3, L4, and L5 medial branch nerve blocks    DIAGNOSIS:  Other lumbar spondylosis    Post Op diagnosis: Same    PHYSICIAN: Dereck Cervantes MD    MEDICATIONS INJECTED: 0.5% ropivacaine, 0.5 ml at each level    SEDATION MEDICATIONS: N/A    LOCAL ANESTHETIC USED: 1% lidocaine, 1 mL at each level    ESTIMATED BLOOD LOSS:  none    COMPLICATIONS:  none    TECHNIQUE: A time out was taken to identify the patient, procedure and side of the procedure. The patient was placed in a prone position, then prepped and draped in the usual sterile fashion using ChloraPrep and sterile towels.  The levels were determined under fluoroscopic guidance and then marked.  A 25-gauge 3.5 inch needle was introduced to the anatomic location of the L2, L3, L4, and L5 medial branch nerves on the bilateral side. The above medication was then injected. The patient tolerated the procedure well.     The patient was monitored after the procedure. Patient was given pain diary to record pain levels at home.     If found to have greater than a 50% recovery and so will be scheduled for a radiofrequency ablation of the corresponding nerves.  Patient was given post procedure and discharge instructions to follow at home.  The patient was discharged in a stable condition.

## 2022-03-30 NOTE — PLAN OF CARE
Son at bedside. Discharge instructions reviewed with patient and son. No questions or concerns at this time. Discharging home with son.

## 2022-04-01 ENCOUNTER — TELEPHONE (OUTPATIENT)
Dept: PAIN MEDICINE | Facility: CLINIC | Age: 82
End: 2022-04-01
Payer: MEDICARE

## 2022-04-01 ENCOUNTER — TELEPHONE (OUTPATIENT)
Dept: FAMILY MEDICINE | Facility: CLINIC | Age: 82
End: 2022-04-01
Payer: MEDICARE

## 2022-04-01 DIAGNOSIS — Z01.818 PRE-OP TESTING: ICD-10-CM

## 2022-04-01 DIAGNOSIS — M47.896 OTHER SPONDYLOSIS, LUMBAR REGION: Primary | ICD-10-CM

## 2022-04-01 NOTE — TELEPHONE ENCOUNTER
----- Message from Domingo Russell sent at 4/1/2022  9:48 AM CDT -----  Type: Patient Call Back         Who called:Patient Son          What is the request in detail:patient son called in regarding wanting to update Dr Cervantes on patient procedure . She states that the procedure went well          Can the clinic reply by [facility]?no          Would the patient rather a call back or a response via [facility]?call back          Best call back number:[phone] (mobile)          Additional Information:           Thank You

## 2022-04-01 NOTE — TELEPHONE ENCOUNTER
----- Message from Torey Mcnally sent at 4/1/2022 12:34 PM CDT -----  Contact: pt's daughter Drake at [phone]  Type: Needs Medical Advice  Who Called: pt's son Drake  Best Call Back Number: [phone]  Additional Information: pt's son Drake is calling the office to let them know he brought his mom to the hospital for high blood sugar and they are going to admit her due to her kidney function is declining. Please call back and  advise.

## 2022-04-02 ENCOUNTER — PATIENT MESSAGE (OUTPATIENT)
Dept: FAMILY MEDICINE | Facility: CLINIC | Age: 82
End: 2022-04-02
Payer: MEDICARE

## 2022-04-04 ENCOUNTER — TELEPHONE (OUTPATIENT)
Dept: FAMILY MEDICINE | Facility: CLINIC | Age: 82
End: 2022-04-04
Payer: MEDICARE

## 2022-04-04 NOTE — TELEPHONE ENCOUNTER
----- Message from Randi Bauman sent at 4/4/2022 10:59 AM CDT -----  Contact: Patient  Type: Patient Call Back         Who called: Patient         What is the request in detail: calling to sched patient for 7-10 day hospital f/u; discharge 4/4; please advise             Best call back number: [phone]         Additional Information: prefers afternoon           Thank You

## 2022-04-04 NOTE — TELEPHONE ENCOUNTER
----- Message from Angela Lynch MA sent at 4/4/2022  9:57 AM CDT -----  Type: Needs Medical Advice  Who Called:  Maliha Farfan Call Back Number: [phone]  Additional Information: patient needs a two week hospital follow up scheduled.  Please call to discuss

## 2022-04-05 ENCOUNTER — TELEPHONE (OUTPATIENT)
Dept: FAMILY MEDICINE | Facility: CLINIC | Age: 82
End: 2022-04-05
Payer: MEDICARE

## 2022-04-05 NOTE — TELEPHONE ENCOUNTER
Spoke with patient's family.  Requesting Hosp F/U appt.  Appt scheduled.  States was given a laxative on Saturday when admitted to [facility] and she is requesting to take Imodium to help stop the diarrhea.  Informed will route message to provider for advisement.

## 2022-04-05 NOTE — TELEPHONE ENCOUNTER
----- Message from Berta Cr sent at 4/5/2022  8:15 AM CDT -----  Regarding: return call  Contact: Son/Drake/[phone]  Type:  Patient Returning Call    Who Called:  Kings/Drkae/[phone]    Who Left Message for Patient:  Norma  Does the patient know what this is regarding?: hospital follow up

## 2022-04-06 NOTE — TELEPHONE ENCOUNTER
Spoke with patients son advised him to not give her the imodium. He stated patient had 3 good BM yesterday and none today so far.

## 2022-04-06 NOTE — TELEPHONE ENCOUNTER
Please let pt know I do not recommend imodium after a laxative. Could she be impacted, is this why she is having the diarrhea? How many stools is she having a day? Did she have a good BM after the laxative? Thanks

## 2022-04-08 ENCOUNTER — DOCUMENTATION ONLY (OUTPATIENT)
Dept: PRIMARY CARE CLINIC | Facility: CLINIC | Age: 82
End: 2022-04-08
Payer: MEDICARE

## 2022-04-08 ENCOUNTER — NURSE TRIAGE (OUTPATIENT)
Dept: ADMINISTRATIVE | Facility: CLINIC | Age: 82
End: 2022-04-08
Payer: MEDICARE

## 2022-04-08 ENCOUNTER — TELEPHONE (OUTPATIENT)
Dept: FAMILY MEDICINE | Facility: CLINIC | Age: 82
End: 2022-04-08
Payer: MEDICARE

## 2022-04-08 ENCOUNTER — PATIENT MESSAGE (OUTPATIENT)
Dept: FAMILY MEDICINE | Facility: CLINIC | Age: 82
End: 2022-04-08
Payer: MEDICARE

## 2022-04-08 NOTE — TELEPHONE ENCOUNTER
Pt's son reports pt's blood sugar was 404 at 6 pm and gave 15 units of insulin, blood sugar was 439 at 9 pm and gave 12 units insulin, blood sugar was 466 at 10:57 pm. Son has not given any more insulin, was scared to be giving so much insulin. Pt last ate at lunch time. Had son recheck pt's blood sugar now at 12:25 am and it is 451, Pt advised a call will be placed to PCP now per protocol, Dr. Gladis Mirza was reached via secure chat after unsuccessful attempts made to contact listed on call POONAM ESCALANTE and she gave her number to call her to triage pt over the phone. Dr. Mirza spoke to pt directly via conferencing the calls together at this time.    Reason for Disposition   Blood glucose > 400 mg/dL (22.2 mmol/L)    Additional Information   Negative: Unconscious or difficult to awaken   Negative: Acting confused (e.g., disoriented, slurred speech)   Negative: Very weak (e.g., can't stand)   Negative: Sounds like a life-threatening emergency to the triager   Negative: [1] Vomiting AND [2] signs of dehydration (e.g., very dry mouth, lightheaded, dark urine)   Negative: [1] Blood glucose > 240 mg/dL (13.3 mmol/L) AND [2] rapid breathing   Negative: Blood glucose > 500 mg/dL (27.8 mmol/L)   Negative: [1] Blood glucose > 240 mg/dL (13.3 mmol/L) AND [2] urine ketones moderate-large (or more than 1+)   Negative: [1] Blood glucose > 240 mg/dL (13.3 mmol/L) AND [2] blood ketones > 1.4 mmol/L   Negative: [1] Blood glucose > 240 mg/dL (13.3 mmol/L) AND [2] vomiting AND [3] unable to check for ketones (in blood or urine)   Negative: [1] New-onset diabetes suspected (e.g., frequent urination, weak, weight loss) AND [2] vomiting or rapid breathing   Negative: Vomiting lasts > 4 hours   Negative: Patient sounds very sick or weak to the triager   Negative: Fever > 100.4 F (38.0 C)    Protocols used: DIABETES - HIGH BLOOD SUGAR-A-AH

## 2022-04-08 NOTE — PROGRESS NOTES
Mansi, This is Toshia with [facility], I have you listed as on call MD for [facility], Can you assist to advise a pt? Number listed for you went to a office voicemail.    Above message was received from Toshia Ponce RN ([facility]) via secure chat this morning 4/8/2022 at 7:27AM when I came into my office. Per message my number is not listed correctly in the call pool, as I am not present in my office at night. Patient spoke to their NP, Gladis Mirza regarding their blood sugar readings. I will attempt to remedy the discrepancies in my telephone number with management today.

## 2022-04-11 ENCOUNTER — OFFICE VISIT (OUTPATIENT)
Dept: FAMILY MEDICINE | Facility: CLINIC | Age: 82
End: 2022-04-11
Payer: MEDICARE

## 2022-04-11 VITALS
HEIGHT: 60 IN | DIASTOLIC BLOOD PRESSURE: 75 MMHG | RESPIRATION RATE: 19 BRPM | WEIGHT: 72.63 LBS | SYSTOLIC BLOOD PRESSURE: 113 MMHG | OXYGEN SATURATION: 99 % | TEMPERATURE: 98 F | BODY MASS INDEX: 14.26 KG/M2 | HEART RATE: 86 BPM

## 2022-04-11 DIAGNOSIS — Z09 HOSPITAL DISCHARGE FOLLOW-UP: ICD-10-CM

## 2022-04-11 DIAGNOSIS — R53.1 WEAKNESS GENERALIZED: ICD-10-CM

## 2022-04-11 DIAGNOSIS — E03.9 ACQUIRED HYPOTHYROIDISM: ICD-10-CM

## 2022-04-11 DIAGNOSIS — N39.0 FREQUENT UTI: Primary | ICD-10-CM

## 2022-04-11 PROCEDURE — 99999 PR PBB SHADOW E&M-EST. PATIENT-LVL V: CPT | Mod: PBBFAC,,, | Performed by: NURSE PRACTITIONER

## 2022-04-11 PROCEDURE — 99214 OFFICE O/P EST MOD 30 MIN: CPT | Mod: S$PBB,,, | Performed by: NURSE PRACTITIONER

## 2022-04-11 PROCEDURE — 99215 OFFICE O/P EST HI 40 MIN: CPT | Mod: PBBFAC | Performed by: NURSE PRACTITIONER

## 2022-04-11 PROCEDURE — 99214 PR OFFICE/OUTPT VISIT, EST, LEVL IV, 30-39 MIN: ICD-10-PCS | Mod: S$PBB,,, | Performed by: NURSE PRACTITIONER

## 2022-04-11 PROCEDURE — 99999 PR PBB SHADOW E&M-EST. PATIENT-LVL V: ICD-10-PCS | Mod: PBBFAC,,, | Performed by: NURSE PRACTITIONER

## 2022-04-11 RX ORDER — PAROXETINE 10 MG/1
10 TABLET, FILM COATED ORAL
COMMUNITY
End: 2022-01-01

## 2022-04-11 RX ORDER — CEFDINIR 300 MG/1
300 CAPSULE ORAL 2 TIMES DAILY
COMMUNITY
Start: 2022-02-01 | End: 2022-04-11

## 2022-04-11 RX ORDER — ONDANSETRON 4 MG/1
4 TABLET, FILM COATED ORAL EVERY 6 HOURS
COMMUNITY
Start: 2022-02-01 | End: 2022-04-11 | Stop reason: HOSPADM

## 2022-04-11 NOTE — DISCHARGE INSTRUCTIONS
Before leaving, please make sure you have all your personal belongings such as glasses, purses, wallets, keys, cell phones, jewelry, jackets etc      Nerve Block  This was a test, not a treatment. Your pain may return.  Keep your pain journal Dr office will call to check your pain levels within 3 days   Perform activities that normally cause you pain during this testing period    Home care instructions  You may apply ice pack to the injection site for 2 days , NO HEAT for 2 days  You may take a shower but no soaking above level of injection in tub or pool for 2 days  Resume Aspirin, Plavix, or Coumadin the day after the procedure unless otherwise instructed.  Do not drive for 24 hr      SEEK  IMMEDIATE MEDICAL HELP FOR:  Severe increase in your usual pain or appearance of new pain  Prolonged  ( more than 8 hours)or increasing weakness or numbness in the legs or arms  Drainage, redness, active bleeding, or increased swelling at the injection site  Temperature over 100.0 degrees F.  Headache that increases when your head is upright and decreases when you lie flat  Shortness of breath, chest pain, or problems breathing      After Surgery:  Always be aware that any surgery can cause these symptoms:    Pain- After this  test, we expect your pain to decrease but  then it may return as the local anesthetic wears off. Try to NOT take your pain medicine during this testing period. Ice and rest can help with pain relief.    Bleeding- a little bleeding after a surgery is usually within normal.  If there is a lot of blood you need to notify your MD.  Emergency treatments of bleeding are cold application, elevation of the bleeding site and compression.    Infection- Infection after surgery is NOT a normal occurrence.  Signs of infection are fever, swelling, hot to touch the incision.  If this occurs notify your MD immediately.    Nausea- this can be common after a surgery especially if you have had anesthesia medicine or are  taking pain medicine.  Staying on clear liquids, bland foods, gingerale, or over the counter anti nausea medicines can help.  If you vomit more than once, notify your MD.  Anti Nausea medicines can be prescribed.

## 2022-04-11 NOTE — PROGRESS NOTES
Subjective:       Patient ID: Maliha Jaramillo is a 81 y.o. female.    Chief Complaint: Hospital Follow Up (Patient was in hospital for 3 days went in on Friday and released on Sunday at [facility]. Patients son states she went in for a kidney infection, UTI, High A1C.)  -  80 y/o female presents for hospital follow up. Having glucose problems. Am 150-160, afternoon 156-300, referrred to dietician. Labs from inpatient viewed with drop from 10.6/33.1 on 4/1/22 down to 8.2/26 8 days later thus will repeat today, denies blood in stool and oncology is monitoring this.     Past Medical History:   Diagnosis Date    Adenocarcinoma of left lung     Anxiety 1954    Arthritis 2013    Cancer     Adenocarcinoma of left lung    Diabetes mellitus     Diabetes mellitus, type 2     Hyperlipidemia 2012    Hypertension     Hypothyroidism (acquired) 2004    Osteoporosis 07/2019    Paroxysmal A-fib     Tachycardia    10.6/33.1 4/1/22    Past Surgical History:   Procedure Laterality Date    APPENDECTOMY  1948    BREAST BIOPSY Right 1980's    benign    EPIDURAL STEROID INJECTION N/A 2/25/2021    Procedure: Injection, Steroid, Epidural L4-L5;  Surgeon: Dereck Cervantes MD;  Location: [facility] OR;  Service: Pain Management;  Laterality: N/A;  oral    EPIDURAL STEROID INJECTION N/A 9/8/2021    Procedure: Injection, Steroid, Epidural;  Surgeon: Dereck Cervantes MD;  Location: [facility] OR;  Service: Pain Management;  Laterality: N/A;  L4-L5     EPIDURAL STEROID INJECTION N/A 9/15/2021    Procedure: Injection, Steroid, Epidural;  Surgeon: Dereck Cervantes MD;  Location: [facility] OR;  Service: Pain Management;  Laterality: N/A;  L4-L5     EPIDURAL STEROID INJECTION INTO LUMBAR SPINE N/A 11/16/2020    Procedure: Injection-steroid-epidural-lumbar;  Surgeon: Dereck Cervantes MD;  Location: [facility] OR;  Service: Pain Management;  Laterality: N/A;  L4-L5    ESOPHAGOGASTRODUODENOSCOPY N/A 6/25/2020    Procedure: ESOPHAGOGASTRODUODENOSCOPY  (EGD);  Surgeon: Ruben Adame MD;  Location: [facility] ENDO;  Service: General;  Laterality: N/A;    EYE SURGERY      HYSTERECTOMY  1978    INJECTION OF ANESTHETIC AGENT AROUND MEDIAL BRANCH NERVES INNERVATING LUMBAR FACET JOINT Bilateral 4/18/2022    Procedure: Block-nerve-medial branch-lumbar L2.L3,L4,L5;  Surgeon: Dereck Cervantes MD;  Location: [facility] OR;  Service: Pain Management;  Laterality: Bilateral;    INJECTION OF ANESTHETIC AGENT AROUND NERVE Bilateral 3/30/2022    Procedure: Block, Nerve Medial Branch L2,L3,L4,L5;  Surgeon: Dereck Cervantes MD;  Location: [facility] OR;  Service: Pain Management;  Laterality: Bilateral;    TONSILLECTOMY  1945    TOTAL REDUCTION MAMMOPLASTY Bilateral 1987    TUBAL LIGATION          Social History     Socioeconomic History    Marital status:    Tobacco Use    Smoking status: Former Smoker     Packs/day: 0.00     Years: 0.00     Pack years: 0.00    Smokeless tobacco: Never Used   Substance and Sexual Activity    Alcohol use: Yes     Alcohol/week: 1.0 standard drink     Types: 1 Standard drinks or equivalent per week     Comment: Couple of drinks per month    Drug use: No    Sexual activity: Not Currently     Partners: Male     Birth control/protection: Condom, Post-menopausal, Spermicide       Family History   Problem Relation Age of Onset    Cancer Sister         Adenocarcinoma    Stroke Father        Review of patient's allergies indicates:   Allergen Reactions    Flu vaccine [id number](5 yr up) Anaphylaxis     Patient's left side of the face is severely swollen on the left side then became unresponsive.     Pneumococcal vaccine Anaphylaxis     Patient's left side of the face is severely swollen on the left side then became unresponsive. Had flu shot same day.     Ciprofloxacin Rash    Ace inhibitors Other (See Comments)     cough    Flu vaccine 2011-12(3 yr+)(pf)           Current Outpatient Medications:     ALPRAZolam (XANAX) 0.25 MG tablet, Take 1  "tablet (0.25 mg total) by mouth nightly as needed for Insomnia., Disp: 30 tablet, Rfl: 2    blood sugar diagnostic Strp, To check BG 2 times daily, to use with insurance preferred meter, Disp: 200 each, Rfl: 1    blood-glucose meter kit, To check BG 2 times daily, to use with insurance preferred meter, Disp: 1 each, Rfl: 3    carvediloL (COREG) 3.125 MG tablet, Take 3.125 mg by mouth 2 (two) times daily with meals., Disp: , Rfl:     diltiaZEM (CARDIZEM LA) 180 mg 24 hr tablet, Take 1 tablet (180 mg total) by mouth once daily., Disp: 90 tablet, Rfl: 1    dulaglutide (TRULICITY) 3 mg/0.5 mL pen injector, Inject 3 mg into the skin every 7 days., Disp: 4 pen, Rfl: 11    furosemide (LASIX) 40 MG tablet, Take 1 tablet (40 mg total) by mouth once daily., Disp: 30 tablet, Rfl: 5    imipramine (TOFRANIL) 50 MG tablet, TAKE 1 TABLET BY MOUTH ONCE DAILY IN THE EVENING, Disp: 90 tablet, Rfl: 0    insulin degludec (TRESIBA FLEXTOUCH U-100) 100 unit/mL (3 mL) insulin pen,  20 unit, SUBQ, Daily, 0 Refill(s), Maintenance, Disp: , Rfl:     lancets Misc, To check BG 2 times daily, to use with insurance preferred meter, Disp: 200 each, Rfl: 1    multivit-min/ferrous fumarate (MULTI VITAMIN ORAL),  1 tab, Oral, Daily, 0 Refill(s), Disp: , Rfl:     nitrofurantoin (MACRODANTIN) 50 MG capsule, Take 1 capsule (50 mg total) by mouth every evening., Disp: 90 capsule, Rfl: 1    nystatin (MYCOSTATIN) powder, Apply topically 4 (four) times daily., Disp: 60 g, Rfl: 2    OLANZapine (ZYPREXA) 2.5 MG tablet, Take 1 tablet (2.5 mg total) by mouth every evening., Disp: 30 tablet, Rfl: 5    paroxetine (PAXIL) 10 MG tablet, Take 10 mg by mouth., Disp: , Rfl:     pen needle, diabetic (1ST TIER UNIFINE PENTIPS) 32 gauge x 5/32" Ndle, 1 Device by Misc.(Non-Drug; Combo Route) route every evening., Disp: 200 each, Rfl: 4    potassium chloride (MICRO-K) 10 MEQ CpSR, Take 1 capsule (10 mEq total) by mouth 2 (two) times daily., Disp: 180 " capsule, Rfl: 1    rivaroxaban (XARELTO) 15 mg Tab, 15 mg., Disp: , Rfl:     SANTYL ointment, Apply topically., Disp: , Rfl:     gabapentin (NEURONTIN) 100 MG capsule, Take 1 capsule (100 mg total) by mouth 3 (three) times daily., Disp: 90 capsule, Rfl: 2    levothyroxine (SYNTHROID) 112 MCG tablet, Take 1 tablet (112 mcg total) by mouth before breakfast., Disp: 30 tablet, Rfl: 0    paroxetine (PAXIL) 10 MG tablet, Take 1 tablet (10 mg total) by mouth every evening. (Patient not taking: Reported on 4/11/2022), Disp: 90 tablet, Rfl: 1  No current facility-administered medications for this visit.    Facility-Administered Medications Ordered in Other Visits:     lactated ringers infusion, , Intravenous, Once PRN, Dereck Cervantes MD    lactated ringers infusion, , Intravenous, Once PRN, Dereck Cervantes MD    HPI  Review of Systems   Constitutional: Negative.    HENT: Negative.    Eyes: Negative.    Respiratory: Negative.    Cardiovascular: Negative.    Gastrointestinal: Negative.    Endocrine: Negative.         Glucose fluctuations    Genitourinary: Positive for frequency and urgency.   Musculoskeletal: Negative.    Skin: Negative.    Allergic/Immunologic: Negative.    Neurological: Negative.    Hematological: Negative.    Psychiatric/Behavioral: Negative.        Objective:      Physical Exam  Vitals and nursing note reviewed. Exam conducted with a chaperone present (Son).   Constitutional:       Appearance: She is well-developed.      Comments: Frail/thin in a wheelchair   HENT:      Head: Normocephalic.   Eyes:      Conjunctiva/sclera: Conjunctivae normal.   Neck:      Thyroid: No thyromegaly.   Cardiovascular:      Rate and Rhythm: Normal rate and regular rhythm.      Heart sounds: Normal heart sounds. No murmur heard.  Pulmonary:      Effort: Pulmonary effort is normal.      Breath sounds: Normal breath sounds. No wheezing or rales.   Musculoskeletal:         General: Normal range of motion.      Cervical  back: Normal range of motion.   Skin:     General: Skin is warm and dry.   Neurological:      Mental Status: She is alert and oriented to person, place, and time. Mental status is at baseline.   Psychiatric:         Mood and Affect: Mood normal.         Behavior: Behavior normal.         Thought Content: Thought content normal.         Judgment: Judgment normal.         Assessment:       1. Frequent UTI    2. Uncontrolled type 2 DM with peripheral circulatory disorder    3. Weakness generalized    4. Hospital discharge follow-up    5. Acquired hypothyroidism        Plan:       1- May 6 Oncology Dr. Hemphill  2- lab today  3- resume HH PT  4- encouraged to eat   5- RTC 4 weeks  6- see dietician as previously ordered  7- add c-peptide d/t frequent UTI, weight loss and uncontrolled DM  8- inc tresiba to 20 units a night  9- refer to urology for frequent UTI    Frequent UTI  -     Ambulatory referral/consult to Urology; Future; Expected date: 04/18/2022  -     TSH; Future; Expected date: 04/11/2022  -     T4, Free; Future; Expected date: 04/11/2022    Uncontrolled type 2 DM with peripheral circulatory disorder  -     CBC Auto Differential; Future; Expected date: 04/11/2022  -     C-peptide; Future; Expected date: 04/11/2022    Weakness generalized    Hospital discharge follow-up    Acquired hypothyroidism  -     TSH; Future; Expected date: 04/11/2022  -     T4, Free; Future; Expected date: 04/11/2022        Risks, benefits, and side effects were discussed with the patient. All questions were answered to the fullest satisfaction of the patient, and pt verbalized understanding and agreement to treatment plan. Pt was to call with any new or worsening symptoms, or present to the ER.

## 2022-04-13 ENCOUNTER — TELEPHONE (OUTPATIENT)
Dept: FAMILY MEDICINE | Facility: CLINIC | Age: 82
End: 2022-04-13
Payer: MEDICARE

## 2022-04-13 NOTE — TELEPHONE ENCOUNTER
----- Message from Danitza Tilley sent at 4/13/2022  9:57 AM CDT -----  Regarding: advice  Contact: son  Type: Needs Medical Advice  Who Called:  brittney Blackwell   Symptoms (please be specific):    How long has patient had these symptoms:    Pharmacy name and phone #:   Best Call Back Number: [phone]   Additional Information: The son want to confirm the provider recommended Sophie to check dexcom meter.

## 2022-04-13 NOTE — TELEPHONE ENCOUNTER
Patient son would like to know what glucose meter you recommended to use at appointment. Please advise

## 2022-04-14 ENCOUNTER — PATIENT MESSAGE (OUTPATIENT)
Dept: FAMILY MEDICINE | Facility: CLINIC | Age: 82
End: 2022-04-14
Payer: MEDICARE

## 2022-04-15 ENCOUNTER — LAB VISIT (OUTPATIENT)
Dept: PRIMARY CARE CLINIC | Facility: CLINIC | Age: 82
End: 2022-04-15
Payer: MEDICARE

## 2022-04-15 DIAGNOSIS — Z01.818 PRE-OP TESTING: ICD-10-CM

## 2022-04-15 PROCEDURE — U0003 INFECTIOUS AGENT DETECTION BY NUCLEIC ACID (DNA OR RNA); SEVERE ACUTE RESPIRATORY SYNDROME CORONAVIRUS 2 (SARS-COV-2) (CORONAVIRUS DISEASE [COVID-19]), AMPLIFIED PROBE TECHNIQUE, MAKING USE OF HIGH THROUGHPUT TECHNOLOGIES AS DESCRIBED BY CMS-2020-01-R: HCPCS | Performed by: ANESTHESIOLOGY

## 2022-04-15 PROCEDURE — U0005 INFEC AGEN DETEC AMPLI PROBE: HCPCS | Performed by: ANESTHESIOLOGY

## 2022-04-16 LAB — SARS-COV-2 RNA RESP QL NAA+PROBE: NOT DETECTED

## 2022-04-18 ENCOUNTER — HOSPITAL ENCOUNTER (OUTPATIENT)
Facility: AMBULARY SURGERY CENTER | Age: 82
Discharge: HOME | End: 2022-04-18
Attending: ANESTHESIOLOGY | Admitting: ANESTHESIOLOGY
Payer: MEDICARE

## 2022-04-18 DIAGNOSIS — M51.36 DEGENERATIVE DISC DISEASE, LUMBAR: Primary | ICD-10-CM

## 2022-04-18 DIAGNOSIS — M47.896 OTHER SPONDYLOSIS, LUMBAR REGION: ICD-10-CM

## 2022-04-18 PROCEDURE — 64493 INJ PARAVERT F JNT L/S 1 LEV: CPT | Mod: RT | Performed by: ANESTHESIOLOGY

## 2022-04-18 PROCEDURE — 64494 INJ PARAVERT F JNT L/S 2 LEV: CPT | Mod: RT | Performed by: ANESTHESIOLOGY

## 2022-04-18 PROCEDURE — 64493 PR INJ DX/THER AGNT PARAVERT FACET JOINT,IMG GUIDE,LUMBAR/SAC,1ST LVL: ICD-10-PCS | Mod: 50,,, | Performed by: ANESTHESIOLOGY

## 2022-04-18 PROCEDURE — 64495 INJ PARAVERT F JNT L/S 3 LEV: CPT | Mod: LT | Performed by: ANESTHESIOLOGY

## 2022-04-18 PROCEDURE — 64495 PR INJ DX/THER AGNT PARAVERT FACET JOINT,IMG GUIDE,LUMBAR/SAC, ADD LEVEL: ICD-10-PCS | Mod: 50,,, | Performed by: ANESTHESIOLOGY

## 2022-04-18 PROCEDURE — 64494 INJ PARAVERT F JNT L/S 2 LEV: CPT | Mod: 50,,, | Performed by: ANESTHESIOLOGY

## 2022-04-18 PROCEDURE — 64495 INJ PARAVERT F JNT L/S 3 LEV: CPT | Mod: 50,,, | Performed by: ANESTHESIOLOGY

## 2022-04-18 PROCEDURE — 64493 INJ PARAVERT F JNT L/S 1 LEV: CPT | Mod: 50,,, | Performed by: ANESTHESIOLOGY

## 2022-04-18 PROCEDURE — 64494 PR INJ DX/THER AGNT PARAVERT FACET JOINT,IMG GUIDE,LUMBAR/SAC, 2ND LEVEL: ICD-10-PCS | Mod: 50,,, | Performed by: ANESTHESIOLOGY

## 2022-04-18 RX ORDER — BUPIVACAINE HYDROCHLORIDE 2.5 MG/ML
INJECTION, SOLUTION EPIDURAL; INFILTRATION; INTRACAUDAL
Status: DISCONTINUED | OUTPATIENT
Start: 2022-04-18 | End: 2022-04-18 | Stop reason: HOSPADM

## 2022-04-18 RX ORDER — LIDOCAINE HYDROCHLORIDE 10 MG/ML
INJECTION, SOLUTION EPIDURAL; INFILTRATION; INTRACAUDAL; PERINEURAL
Status: DISCONTINUED | OUTPATIENT
Start: 2022-04-18 | End: 2022-04-18 | Stop reason: HOSPADM

## 2022-04-18 RX ORDER — SODIUM CHLORIDE, SODIUM LACTATE, POTASSIUM CHLORIDE, CALCIUM CHLORIDE 600; 310; 30; 20 MG/100ML; MG/100ML; MG/100ML; MG/100ML
INJECTION, SOLUTION INTRAVENOUS ONCE AS NEEDED
Status: ACTIVE | OUTPATIENT
Start: 2022-04-18 | End: 2033-09-14

## 2022-04-18 NOTE — PLAN OF CARE
Discharge instructions given to pt/son, verbalized understanding.  Tolerating PO fluids.  No c/o pain.  Pain journal given.  Baseline impaired mobility noted, pt transferred to , then vehicle per nurse/son assist in no distress.

## 2022-04-18 NOTE — OP NOTE
PROCEDURE DATE: 4/18/2022    PROCEDURE:  Bilateral L2, L3, L4, and L5 medial branch nerve blocks    DIAGNOSIS:  Other lumbar spondylosis    Post Op diagnosis: Same    PHYSICIAN: Dereck Cervantes MD    MEDICATIONS INJECTED: 0.5% bupivicaine, 0.5 ml at each level    SEDATION MEDICATIONS: N/A    LOCAL ANESTHETIC USED: 1% lidocaine, 1 mL at each level    ESTIMATED BLOOD LOSS:  none    COMPLICATIONS:  none    TECHNIQUE: A time out was taken to identify the patient, procedure and side of the procedure. The patient was placed in a prone position, then prepped and draped in the usual sterile fashion using ChloraPrep and sterile towels.  The levels were determined under fluoroscopic guidance and then marked.  A 25-gauge 3.5 inch needle was introduced to the anatomic location of the L2, L3, L4, and L5 medial branch nerves on the bilateral side. The above medication was then injected. The patient tolerated the procedure well.     The patient was monitored after the procedure. Patient was given pain diary to record pain levels at home.     If found to have greater than a 50% recovery and so will be scheduled for a radiofrequency ablation of the corresponding nerves.  Patient was given post procedure and discharge instructions to follow at home.  The patient was discharged in a stable condition.

## 2022-04-18 NOTE — DISCHARGE SUMMARY
[facility]  Discharge Note  Short Stay    Procedure(s) (LRB):  Block-nerve-medial branch-lumbar L2.L3,L4,L5 (Bilateral)    OUTCOME: Patient tolerated treatment/procedure well without complication and is now ready for discharge.    DISPOSITION: Home or Self Care    FINAL DIAGNOSIS:  Other spondylosis, lumbar    FOLLOWUP: In clinic    DISCHARGE INSTRUCTIONS:    Discharge Procedure Orders   Diet general     Call MD for:  temperature >100.4     Call MD for:  persistent nausea and vomiting     Call MD for:  severe uncontrolled pain     Call MD for:  difficulty breathing, headache or visual disturbances     Call MD for:  redness, tenderness, or signs of infection (pain, swelling, redness, odor or green/yellow discharge around incision site)     Call MD for:  hives     Call MD for:  persistent dizziness or light-headedness     Call MD for:  extreme fatigue        TIME SPENT ON DISCHARGE: 15 minutes

## 2022-04-19 ENCOUNTER — TELEPHONE (OUTPATIENT)
Dept: PAIN MEDICINE | Facility: CLINIC | Age: 82
End: 2022-04-19
Payer: MEDICARE

## 2022-04-19 DIAGNOSIS — M47.896 OTHER SPONDYLOSIS, LUMBAR REGION: Primary | ICD-10-CM

## 2022-04-19 DIAGNOSIS — M51.36 DDD (DEGENERATIVE DISC DISEASE), LUMBAR: ICD-10-CM

## 2022-04-19 NOTE — PROGRESS NOTES
RELIEF CONFIRMED WITH PATIENT AND WOULD LIKE TO MOVE FORWARD WITH THE NEXT PROCEDURE. RFA SCHEDULED FOR 5/4/22

## 2022-04-19 NOTE — TELEPHONE ENCOUNTER
----- Message from Kinsey Pena LPN sent at 4/19/2022  2:09 PM CDT -----  Contact: Drake, son  Pt wants to tiffanie in [facility] .  Thanks Pat   ----- Message -----  From: Yue Bullock  Sent: 4/19/2022   1:51 PM CDT  To: [facility] Staff    Type: Needs Medical Advice    Who Called:  [facility]    Best Call Back Number: [phone]    Additional Information: States that pt is still pain-free after procedure.  Also, they would like her next procedure to be scheduled in [place].  Please call back.  Thanks.

## 2022-04-19 NOTE — TELEPHONE ENCOUNTER
Spoke to Drake. He stated his mother is much better and would like to procedure with the next step of the procedures. She will be scheduled for the RFA on 5/4/22

## 2022-04-20 VITALS
DIASTOLIC BLOOD PRESSURE: 57 MMHG | HEIGHT: 60 IN | SYSTOLIC BLOOD PRESSURE: 118 MMHG | OXYGEN SATURATION: 99 % | BODY MASS INDEX: 14.14 KG/M2 | WEIGHT: 72 LBS | TEMPERATURE: 98 F | RESPIRATION RATE: 18 BRPM | HEART RATE: 72 BPM

## 2022-04-24 ENCOUNTER — PATIENT MESSAGE (OUTPATIENT)
Dept: FAMILY MEDICINE | Facility: CLINIC | Age: 82
End: 2022-04-24
Payer: MEDICARE

## 2022-04-24 DIAGNOSIS — N39.0 FREQUENT UTI: ICD-10-CM

## 2022-04-24 DIAGNOSIS — E03.9 ACQUIRED HYPOTHYROIDISM: Primary | ICD-10-CM

## 2022-04-25 RX ORDER — ALPRAZOLAM 0.25 MG/1
0.25 TABLET ORAL NIGHTLY PRN
Qty: 30 TABLET | Refills: 2 | Status: SHIPPED | OUTPATIENT
Start: 2022-04-25 | End: 2022-05-11 | Stop reason: SDUPTHER

## 2022-04-25 NOTE — TELEPHONE ENCOUNTER
----- Message from Roberta Simon sent at 4/25/2022  4:30 PM CDT -----  Contact: son  Type:  RX Refill Request    Who Called:  pranav Juan  Refill or New Rx:  refill  RX Name and Strength:  ALPRAZolam (XANAX) 0.25 MG tablet  How is the patient currently taking it? (ex. 1XDay):  2xday  Is this a 30 day or 90 day RX:  30  Preferred Pharmacy with phone number:    [id number] - [place], [place] - [address] [address] AT [address] [address] & [facility]  [address] [address] [address]  Phone: [phone] Fax: [phone]  Local or Mail Order:  local  Ordering Provider:  Tabatha Farfan Call Back Number:  [phone] (home)   Additional Information:  patient ran out- was told to take 2 a day, prescription says one a day

## 2022-04-26 NOTE — TELEPHONE ENCOUNTER
Pt was seen on 4/11/22 and states she went to our lab and had blood drawn but it does not appear to be done in our system. Please fax labs to [facility] and they can draw. Thanks

## 2022-04-27 ENCOUNTER — TELEPHONE (OUTPATIENT)
Dept: DIABETES | Facility: CLINIC | Age: 82
End: 2022-04-27
Payer: MEDICARE

## 2022-04-27 NOTE — TELEPHONE ENCOUNTER
----- Message from Yecenia Flood sent at 4/27/2022  9:44 AM CDT -----  Type:  Sooner Appointment Request    Caller is requesting a sooner appointment.  Caller declined first available appointment listed below.  Caller will not accept being placed on the waitlist and is requesting a message be sent to doctor.    Name of Caller:  Pt daughter  When is the first available appointment?  Pt is unable to make appt for today and is asking to reschedule the appt due to a dental emergency     Best Call Back Number:  [phone]  Additional Information:  Please call and advise

## 2022-04-27 NOTE — TELEPHONE ENCOUNTER
Contacted patient's son Drake and rescheduled visit to requested date of 5/11/22 @ 2pm.  Patient unable to make appointment today due to dental issues.    Asked son to bring glucose readings to upcoming diabetes education visit. Son verbalizes understanding of above.

## 2022-04-29 ENCOUNTER — LAB VISIT (OUTPATIENT)
Dept: LAB | Facility: HOSPITAL | Age: 82
End: 2022-04-29
Attending: NURSE PRACTITIONER
Payer: MEDICARE

## 2022-04-29 DIAGNOSIS — E03.9 ACQUIRED HYPOTHYROIDISM: ICD-10-CM

## 2022-04-29 DIAGNOSIS — N39.0 FREQUENT UTI: ICD-10-CM

## 2022-04-29 LAB
BASOPHILS # BLD AUTO: 0.05 K/UL (ref 0–0.2)
BASOPHILS NFR BLD: 0.7 % (ref 0–1.9)
DIFFERENTIAL METHOD: ABNORMAL
EOSINOPHIL # BLD AUTO: 0.3 K/UL (ref 0–0.5)
EOSINOPHIL NFR BLD: 4.5 % (ref 0–8)
ERYTHROCYTE [DISTWIDTH] IN BLOOD BY AUTOMATED COUNT: 14 % (ref 11.5–14.5)
HCT VFR BLD AUTO: 28.3 % (ref 37–48.5)
HGB BLD-MCNC: 9.4 G/DL (ref 12–16)
IMM GRANULOCYTES # BLD AUTO: 0.02 K/UL (ref 0–0.04)
IMM GRANULOCYTES NFR BLD AUTO: 0.3 % (ref 0–0.5)
LYMPHOCYTES # BLD AUTO: 2.4 K/UL (ref 1–4.8)
LYMPHOCYTES NFR BLD: 32.9 % (ref 18–48)
MCH RBC QN AUTO: 29.9 PG (ref 27–31)
MCHC RBC AUTO-ENTMCNC: 33.2 G/DL (ref 32–36)
MCV RBC AUTO: 90 FL (ref 82–98)
MONOCYTES # BLD AUTO: 0.6 K/UL (ref 0.3–1)
MONOCYTES NFR BLD: 7.5 % (ref 4–15)
NEUTROPHILS # BLD AUTO: 4 K/UL (ref 1.8–7.7)
NEUTROPHILS NFR BLD: 54.1 % (ref 38–73)
NRBC BLD-RTO: 0 /100 WBC
PLATELET # BLD AUTO: 285 K/UL (ref 150–450)
PMV BLD AUTO: 9.3 FL (ref 9.2–12.9)
RBC # BLD AUTO: 3.14 M/UL (ref 4–5.4)
T4 FREE SERPL-MCNC: 0.97 NG/DL (ref 0.71–1.51)
TSH SERPL DL<=0.005 MIU/L-ACNC: 0.07 UIU/ML (ref 0.4–4)
WBC # BLD AUTO: 7.33 K/UL (ref 3.9–12.7)

## 2022-04-29 PROCEDURE — 84439 ASSAY OF FREE THYROXINE: CPT | Performed by: NURSE PRACTITIONER

## 2022-04-29 PROCEDURE — 84681 ASSAY OF C-PEPTIDE: CPT | Performed by: NURSE PRACTITIONER

## 2022-04-29 PROCEDURE — 85025 COMPLETE CBC W/AUTO DIFF WBC: CPT | Performed by: NURSE PRACTITIONER

## 2022-04-29 PROCEDURE — 84443 ASSAY THYROID STIM HORMONE: CPT | Performed by: NURSE PRACTITIONER

## 2022-04-29 PROCEDURE — 36415 COLL VENOUS BLD VENIPUNCTURE: CPT | Performed by: NURSE PRACTITIONER

## 2022-04-30 LAB — C PEPTIDE SERPL-MCNC: 3.45 NG/ML (ref 0.78–5.19)

## 2022-05-02 ENCOUNTER — PATIENT MESSAGE (OUTPATIENT)
Dept: UROLOGY | Facility: CLINIC | Age: 82
End: 2022-05-02
Payer: MEDICARE

## 2022-05-02 RX ORDER — LEVOTHYROXINE SODIUM 88 UG/1
88 TABLET ORAL
Qty: 60 TABLET | Refills: 0 | Status: SHIPPED | OUTPATIENT
Start: 2022-05-02 | End: 2022-01-01

## 2022-05-04 ENCOUNTER — HOSPITAL ENCOUNTER (OUTPATIENT)
Facility: HOSPITAL | Age: 82
Discharge: HOME | End: 2022-05-04
Attending: ANESTHESIOLOGY | Admitting: ANESTHESIOLOGY
Payer: MEDICARE

## 2022-05-04 ENCOUNTER — DOCUMENTATION ONLY (OUTPATIENT)
Dept: SURGERY | Facility: HOSPITAL | Age: 82
End: 2022-05-04

## 2022-05-04 VITALS
WEIGHT: 72 LBS | BODY MASS INDEX: 14.14 KG/M2 | OXYGEN SATURATION: 94 % | RESPIRATION RATE: 9 BRPM | TEMPERATURE: 98 F | HEART RATE: 83 BPM | SYSTOLIC BLOOD PRESSURE: 124 MMHG | DIASTOLIC BLOOD PRESSURE: 63 MMHG | HEIGHT: 60 IN

## 2022-05-04 DIAGNOSIS — M51.36 DEGENERATIVE DISC DISEASE, LUMBAR: ICD-10-CM

## 2022-05-04 DIAGNOSIS — M47.896 OTHER SPONDYLOSIS, LUMBAR REGION: Primary | ICD-10-CM

## 2022-05-04 LAB
CTP QC/QA: YES
SARS-COV-2 AG RESP QL IA.RAPID: NEGATIVE

## 2022-05-04 PROCEDURE — 64635 DESTROY LUMB/SAC FACET JNT: CPT | Mod: 50,,, | Performed by: ANESTHESIOLOGY

## 2022-05-04 PROCEDURE — 64493 INJ PARAVERT F JNT L/S 1 LEV: CPT | Mod: 50 | Performed by: ANESTHESIOLOGY

## 2022-05-04 PROCEDURE — 25000003 PHARM REV CODE 250: Performed by: ANESTHESIOLOGY

## 2022-05-04 PROCEDURE — 64636 DESTROY L/S FACET JNT ADDL: CPT | Mod: 50,,, | Performed by: ANESTHESIOLOGY

## 2022-05-04 PROCEDURE — 99152 MOD SED SAME PHYS/QHP 5/>YRS: CPT | Performed by: ANESTHESIOLOGY

## 2022-05-04 PROCEDURE — 64635 PR DESTROY LUMB/SAC FACET JNT: ICD-10-PCS | Mod: 50,,, | Performed by: ANESTHESIOLOGY

## 2022-05-04 PROCEDURE — 99153 MOD SED SAME PHYS/QHP EA: CPT | Performed by: ANESTHESIOLOGY

## 2022-05-04 PROCEDURE — 64494 INJ PARAVERT F JNT L/S 2 LEV: CPT | Mod: 50 | Performed by: ANESTHESIOLOGY

## 2022-05-04 PROCEDURE — 64495 INJ PARAVERT F JNT L/S 3 LEV: CPT | Mod: 50 | Performed by: ANESTHESIOLOGY

## 2022-05-04 PROCEDURE — 64636 PR DESTROY L/S FACET JNT ADDL: ICD-10-PCS | Mod: 50,,, | Performed by: ANESTHESIOLOGY

## 2022-05-04 PROCEDURE — 64636 DESTROY L/S FACET JNT ADDL: CPT | Mod: RT | Performed by: ANESTHESIOLOGY

## 2022-05-04 PROCEDURE — 63600175 PHARM REV CODE 636 W HCPCS: Performed by: ANESTHESIOLOGY

## 2022-05-04 PROCEDURE — 64635 DESTROY LUMB/SAC FACET JNT: CPT | Mod: 50 | Performed by: ANESTHESIOLOGY

## 2022-05-04 RX ORDER — LIDOCAINE HYDROCHLORIDE 20 MG/ML
INJECTION, SOLUTION EPIDURAL; INFILTRATION; INTRACAUDAL; PERINEURAL
Status: DISCONTINUED | OUTPATIENT
Start: 2022-05-04 | End: 2022-05-04 | Stop reason: HOSPADM

## 2022-05-04 RX ORDER — SODIUM CHLORIDE, SODIUM LACTATE, POTASSIUM CHLORIDE, CALCIUM CHLORIDE 600; 310; 30; 20 MG/100ML; MG/100ML; MG/100ML; MG/100ML
INJECTION, SOLUTION INTRAVENOUS ONCE AS NEEDED
Status: ACTIVE | OUTPATIENT
Start: 2022-05-04 | End: 2033-09-30

## 2022-05-04 RX ORDER — ROPIVACAINE HYDROCHLORIDE 5 MG/ML
INJECTION, SOLUTION EPIDURAL; INFILTRATION; PERINEURAL
Status: DISCONTINUED | OUTPATIENT
Start: 2022-05-04 | End: 2022-05-04 | Stop reason: HOSPADM

## 2022-05-04 RX ORDER — DEXAMETHASONE SODIUM PHOSPHATE 4 MG/ML
INJECTION, SOLUTION INTRA-ARTICULAR; INTRALESIONAL; INTRAMUSCULAR; INTRAVENOUS; SOFT TISSUE
Status: DISCONTINUED | OUTPATIENT
Start: 2022-05-04 | End: 2022-05-04 | Stop reason: HOSPADM

## 2022-05-04 RX ORDER — FENTANYL CITRATE 50 UG/ML
INJECTION, SOLUTION INTRAMUSCULAR; INTRAVENOUS
Status: DISCONTINUED | OUTPATIENT
Start: 2022-05-04 | End: 2022-05-04 | Stop reason: HOSPADM

## 2022-05-04 NOTE — DISCHARGE SUMMARY
[facility]  Discharge Note  Short Stay    Procedure(s) (LRB):  INJECTION, NERVE RFA L2-L3-L4-L5 (Bilateral)    OUTCOME: Patient tolerated treatment/procedure well without complication and is now ready for discharge.    DISPOSITION: Home or Self Care    FINAL DIAGNOSIS:  Other spondylosis, lumbar    FOLLOWUP: In clinic    DISCHARGE INSTRUCTIONS:    Discharge Procedure Orders   Diet general     Call MD for:  temperature >100.4     Call MD for:  persistent nausea and vomiting     Call MD for:  severe uncontrolled pain     Call MD for:  difficulty breathing, headache or visual disturbances     Call MD for:  redness, tenderness, or signs of infection (pain, swelling, redness, odor or green/yellow discharge around incision site)     Call MD for:  hives     Call MD for:  persistent dizziness or light-headedness     Call MD for:  extreme fatigue        TIME SPENT ON DISCHARGE: 30 minutes

## 2022-05-04 NOTE — OP NOTE
PROCEDURE DATE: 5/4/2022    PROCEDURE:  Radiofrequency ablation of the L2, L3, L4, and L5 medial branch nerves on the bilateral-side utilizing fluoroscopy    DIAGNOSIS:  Other lumbar spondylosis  Post op Diagnosis: Same    PHYSICIAN: Dereck Cervantes MD    MEDICATIONS INJECTED:  From a mixture of 7 ml of 0.25% ropivacaine and 810 mg of dexamethasone,  1ml of this solution was injected at each level.    LOCAL ANESTHETIC USED: Lidocaine 1%, 1 ml given at each site.    SEDATION MEDICATIONS: Under my direction supervision, intravenous moderate sedation was administered during the course of this procedure, with continuous monitoring of hemodynamic parameters. Total time of sedation was 25 minutes.     ESTIMATED BLOOD LOSS:  none    COMPLICATIONS:  none    TECHNIQUE:  A time out was taken to identify patient and procedure side prior to starting the procedure. Laying in a prone position, the patient was prepped and draped in the usual sterile fashion using ChloraPrep and sterile towels.  The levels were determined under fluoroscopic guidance and then marked.  Local anesthetic was given by raising a wheal at the skin over each site and then infiltrated approximately 2cm deeper.  A 20-gauge  145 mm RF needle was introduced to the anatomic location of the bilateral L2, L3, L4, and L5 medial branch nerves. Motor stimulation up to 2 Volts at each level confirmed no motor nerve involvement.  Impedance was less than 800 ohms at each level.  1ml of 2% lidocaine was instilled prior to lesioning.  Ablation was performed per level utilizing radiofrequency generator 80°C for 60 seconds.  Needles were then rotated 90° and a second lesion was performed.  The above noted medication was then injected slowly. The patient tolerated the procedure well.     The patient was monitored after the procedure.  Patient was given post procedure and discharge instructions to follow at home.  The patient was discharged in a stable condition

## 2022-05-04 NOTE — H&P
FOLLOW UP NOTE:     CHIEF COMPLAINT: back pain    INTERVAL HISTORY OF PRESENT ILLNESS: Maliha Jaramillo is a 81 y.o. female who presents for bilateral L2, L3, L4, and L5 medial branch nerves radiofrequency ablation. The patient denies of any significant changes in her health since her last appointment. The patient also denies of any changes in the character of her pain since her last appointment.     ROS:  Review of Systems   Constitutional: Negative for chills and fever.   HENT: Negative for sore throat.    Eyes: Negative for visual disturbance.   Respiratory: Negative for shortness of breath.    Cardiovascular: Negative for chest pain.   Gastrointestinal: Negative for nausea and vomiting.   Genitourinary: Negative for difficulty urinating.   Musculoskeletal: Positive for back pain.   Skin: Negative for rash.   Allergic/Immunologic: Negative for immunocompromised state.   Neurological: Negative for syncope.   Hematological: Does not bruise/bleed easily.   Psychiatric/Behavioral: Negative for suicidal ideas.        MEDICAL, SURGICAL, FAMILY, SOCIAL HX: reviewed    MEDICATIONS/ALLERGIES: reviewed    PHYSICAL EXAM:    VITALS: Vitals reviewed.   Vitals:    05/04/22 1056   BP: 125/67   Pulse: 80   Resp: 16   Temp: 97.7 °F (36.5 °C)   SpO2: 99%   Weight: 32.7 kg (72 lb)   Height: 5' (1.524 m)       Physical Exam  Vitals and nursing note reviewed.   Constitutional:       Appearance: She is not diaphoretic.   HENT:      Head: Normocephalic and atraumatic.   Eyes:      General:         Right eye: No discharge.         Left eye: No discharge.      Conjunctiva/sclera: Conjunctivae normal.   Cardiovascular:      Rate and Rhythm: Normal rate.   Pulmonary:      Effort: Pulmonary effort is normal. No respiratory distress.      Breath sounds: Normal breath sounds.   Abdominal:      Palpations: Abdomen is soft.   Skin:     General: Skin is warm and dry.      Findings: No rash.   Neurological:      Mental Status: She is alert and  oriented to person, place, and time.   Psychiatric:         Mood and Affect: Mood and affect normal.         Cognition and Memory: Memory normal.         Judgment: Judgment normal.          EXTREMITIES:    Gen: No cyanosis, edema, varicosities, or tenderness to palpation BLE   Skin: Warm, pink, dry, no rashes, no lesions BLE   Strength: 5/5 motor strength BLE   ROM: hips, knees and ankles without pain or instability.     NEUROLOGICAL:    Gen: No clonus or spasticity.   Gait: Normal without antalgic lean   DTR's: 2+ in bilateral patellar, and ankle   BABINSKI: Absent bilaterally  Sensory: Intact to light touch and proprioception BLE    ASSESSMENT: Maliha Jaramillo is a 81 y.o. female who presents for bilateral L2, L3, L4, and L5 medial branch nerves radiofrequency ablation.     PLAN:  1. Proceed with bilateral L2, L3, L4, and L5 medial branch nerves radiofrequency ablation as previously discussed.    This patient has been cleared for surgery in an ambulatory surgical facility    ASA 3,  Mallampatti Score 3  No history of anesthetic complications  Plan for RN IV sedation    Dereck Cervantes MD  Pain Management

## 2022-05-06 ENCOUNTER — OFFICE VISIT (OUTPATIENT)
Dept: UROLOGY | Facility: CLINIC | Age: 82
End: 2022-05-06
Payer: MEDICARE

## 2022-05-06 VITALS
HEART RATE: 79 BPM | SYSTOLIC BLOOD PRESSURE: 130 MMHG | WEIGHT: 72.06 LBS | DIASTOLIC BLOOD PRESSURE: 72 MMHG | HEIGHT: 60 IN | BODY MASS INDEX: 14.15 KG/M2

## 2022-05-06 DIAGNOSIS — N39.0 RECURRENT UTI: Primary | ICD-10-CM

## 2022-05-06 DIAGNOSIS — N39.0 FREQUENT UTI: ICD-10-CM

## 2022-05-06 LAB — POC RESIDUAL URINE VOLUME: 104 ML (ref 0–100)

## 2022-05-06 PROCEDURE — 99999 PR PBB SHADOW E&M-EST. PATIENT-LVL V: CPT | Mod: PBBFAC,,, | Performed by: NURSE PRACTITIONER

## 2022-05-06 PROCEDURE — 99999 PR PBB SHADOW E&M-EST. PATIENT-LVL V: ICD-10-PCS | Mod: PBBFAC,,, | Performed by: NURSE PRACTITIONER

## 2022-05-06 PROCEDURE — 51798 US URINE CAPACITY MEASURE: CPT | Mod: PBBFAC,PN | Performed by: NURSE PRACTITIONER

## 2022-05-06 PROCEDURE — 99215 OFFICE O/P EST HI 40 MIN: CPT | Mod: PBBFAC,PN | Performed by: NURSE PRACTITIONER

## 2022-05-06 PROCEDURE — 99204 PR OFFICE/OUTPT VISIT, NEW, LEVL IV, 45-59 MIN: ICD-10-PCS | Mod: S$PBB,,, | Performed by: NURSE PRACTITIONER

## 2022-05-06 PROCEDURE — 99204 OFFICE O/P NEW MOD 45 MIN: CPT | Mod: S$PBB,,, | Performed by: NURSE PRACTITIONER

## 2022-05-06 RX ORDER — SULFAMETHOXAZOLE AND TRIMETHOPRIM 800; 160 MG/1; MG/1
1 TABLET ORAL
COMMUNITY
Start: 2022-05-05 | End: 2022-05-12

## 2022-05-06 NOTE — PROGRESS NOTES
[facility] Note  Staff: NATACHA Adair    PCP: FAVIOLA Mirza    Chief Complaint: Recurrent UTIs    Subjective:        HPI: Maliha Jaramillo is a 81 y.o. female NEW PT presents today in office for evaluation of recurrent urinary tract infections.  She is here today with her son during ov.    05/5/22:  ER Visit-[facility]  Pt was evaluated for generalized weakness and acute UTI symptoms yesterday at [facility] in [place], [place].  UA was abnormal +2 Leuks, Trace blood, +bacteria.  Unknown if culture was sent off from ER.  Pt was administered IV Rocephin 1 gram and discharged home on Bactrim DS BID x 7 days.    No urine collected in office today.  Pt unable to move out of .  PVR by bladder scan performed by MA in office today:  104 mL    REVIEW OF SYSTEMS:  A comprehensive 10 system review was performed and is negative except as noted above in HPI    PMHx:  Past Medical History:   Diagnosis Date    Adenocarcinoma of left lung     Anxiety 1954    Arthritis 2013    Cancer     Adenocarcinoma of left lung    Diabetes mellitus     Diabetes mellitus, type 2     Hyperlipidemia 2012    Hypertension     Hypothyroidism (acquired) 2004    Osteoporosis 07/2019    Paroxysmal A-fib     Stroke     Tachycardia     Urinary tract infection      PSHx:  Past Surgical History:   Procedure Laterality Date    APPENDECTOMY  1948    BREAST BIOPSY Right 1980's    benign    EPIDURAL STEROID INJECTION N/A 2/25/2021    Procedure: Injection, Steroid, Epidural L4-L5;  Surgeon: Dereck Cervantes MD;  Location: [facility] OR;  Service: Pain Management;  Laterality: N/A;  oral    EPIDURAL STEROID INJECTION N/A 9/8/2021    Procedure: Injection, Steroid, Epidural;  Surgeon: Dereck Cervantes MD;  Location: [facility] OR;  Service: Pain Management;  Laterality: N/A;  L4-L5     EPIDURAL STEROID INJECTION N/A 9/15/2021    Procedure: Injection, Steroid, Epidural;  Surgeon: Dereck Cervantes MD;   Location: [facility] OR;  Service: Pain Management;  Laterality: N/A;  L4-L5     EPIDURAL STEROID INJECTION INTO LUMBAR SPINE N/A 11/16/2020    Procedure: Injection-steroid-epidural-lumbar;  Surgeon: Dereck Cervantes MD;  Location: [facility] OR;  Service: Pain Management;  Laterality: N/A;  L4-L5    ESOPHAGOGASTRODUODENOSCOPY N/A 6/25/2020    Procedure: ESOPHAGOGASTRODUODENOSCOPY (EGD);  Surgeon: Ruben Adame MD;  Location: [facility] ENDO;  Service: General;  Laterality: N/A;    EYE SURGERY      HYSTERECTOMY  1978    INJECTION OF ANESTHETIC AGENT AROUND MEDIAL BRANCH NERVES INNERVATING LUMBAR FACET JOINT Bilateral 4/18/2022    Procedure: Block-nerve-medial branch-lumbar L2.L3,L4,L5;  Surgeon: Dereck Cervantes MD;  Location: [facility] OR;  Service: Pain Management;  Laterality: Bilateral;    INJECTION OF ANESTHETIC AGENT AROUND NERVE Bilateral 3/30/2022    Procedure: Block, Nerve Medial Branch L2,L3,L4,L5;  Surgeon: Dereck Cervantes MD;  Location: [facility] OR;  Service: Pain Management;  Laterality: Bilateral;    INJECTION OF NERVE Bilateral 5/4/2022    Procedure: INJECTION, NERVE RFA L2-L3-L4-L5;  Surgeon: Dereck Cervantes MD;  Location: [facility] OR;  Service: Anesthesiology;  Laterality: Bilateral;    TONSILLECTOMY  1945    TOTAL REDUCTION MAMMOPLASTY Bilateral 1987    TUBAL LIGATION       Allergies:  Flu vaccine [id number](5 yr up), Pneumococcal vaccine, Ciprofloxacin, Ace inhibitors, and Flu vaccine 2011-12(3 yr+)(pf)    Medications: reviewed     Objective:     Vitals:    05/06/22 1424   BP: 130/72   Pulse: 79       Physical Exam  Vitals reviewed.   Constitutional:       Appearance: She is well-developed.   HENT:      Head: Normocephalic and atraumatic.   Eyes:      Conjunctiva/sclera: Conjunctivae normal.      Pupils: Pupils are equal, round, and reactive to light.   Cardiovascular:      Rate and Rhythm: Normal rate and regular rhythm.      Heart sounds: Normal heart sounds.   Pulmonary:      Effort: Pulmonary effort is normal.       Breath sounds: Normal breath sounds.   Abdominal:      General: Bowel sounds are normal.      Palpations: Abdomen is soft.   Musculoskeletal:         General: Normal range of motion.      Cervical back: Normal range of motion and neck supple.   Skin:     General: Skin is warm and dry.   Neurological:      Mental Status: She is alert and oriented to person, place, and time.      Deep Tendon Reflexes: Reflexes are normal and symmetric.   Psychiatric:         Behavior: Behavior normal.         Thought Content: Thought content normal.         Judgment: Judgment normal.         Assessment:       1. Recurrent UTI    2. Frequent UTI    3. Uncontrolled type 2 DM with peripheral circulatory disorder          Plan:     1. Continue Bactrim DS BID at this time until completion.  2. Orders will be faxed to [facility] ([phone]) to do in and out cath on pt in 2 weeks to be sent for repeat urine culture at that time.    For your recurrent UTIs:  Behavioral changes to help decrease UTI recurrences   -increase water intake (6 to 8 bottles water per day)   -don't hold urine, void every 2 to 3 hours   -prevent constipation (miralax capful daily if necessary) to have a bowel movement daily and keep stools soft  -cut down on caffeine,drink mainly water  -no douching   -void immediately after sexual intercourse  -wipe front to back     OTC meds to try (go to the pharmacist and ask where they are, they are over the counter)  -Use lactobacillus probiotic in capsule form in vagina once nightly for 10 days if you feel like you have an infection coming on   -cranberry pills 500mg tabs TID, can buy over the counter  -take a probiotic daily  -D-Mannose once daily over the counter    IMPORTANT: If you feel like you have a UTI coming on, call our office and talk to one of the nurses. We will have you drop off a urine here in clinic or at one of our [facility] facilities. I do not typically like to write for antibiotics unless we have  a urine culture. Avoid going to urgent care. We need to start documenting your urine cultures here in our office to see if they are really recurrent UTIs or sx of UTIs.     If it is over the weekend and you cannot wait, call our on call doctor, however we still prefer urine cultures before giving antibiotics.     If you have fever and it doesn't improve with tylenol or ibuprofen or if you have flank pain associated with the uti symptoms go to the ER.     If you do continue to have positive urine cultures then we may proceed with doing a cystoscopy (to look in your bladder) and an imaging study.     F/u prn    [facility]: Active    Brianda Morales, FNP-C

## 2022-05-11 ENCOUNTER — PATIENT MESSAGE (OUTPATIENT)
Dept: FAMILY MEDICINE | Facility: CLINIC | Age: 82
End: 2022-05-11
Payer: MEDICARE

## 2022-05-11 DIAGNOSIS — F41.1 GAD (GENERALIZED ANXIETY DISORDER): ICD-10-CM

## 2022-05-11 DIAGNOSIS — F51.01 PRIMARY INSOMNIA: ICD-10-CM

## 2022-05-11 RX ORDER — INSULIN ASPART 100 [IU]/ML
INJECTION, SOLUTION INTRAVENOUS; SUBCUTANEOUS
Qty: 4 EACH | Refills: 5 | Status: SHIPPED | OUTPATIENT
Start: 2022-05-11 | End: 2022-01-01 | Stop reason: SDUPTHER

## 2022-05-11 RX ORDER — ALPRAZOLAM 0.25 MG/1
0.25 TABLET ORAL NIGHTLY PRN
Qty: 30 TABLET | Refills: 2 | Status: CANCELLED | OUTPATIENT
Start: 2022-05-11 | End: 2022-01-01

## 2022-05-11 RX ORDER — ALPRAZOLAM 0.25 MG/1
0.25 TABLET ORAL 2 TIMES DAILY PRN
Qty: 30 TABLET | Refills: 2 | Status: SHIPPED | OUTPATIENT
Start: 2022-05-11 | End: 2022-01-01 | Stop reason: SDUPTHER

## 2022-05-11 NOTE — TELEPHONE ENCOUNTER
Please let [facility] know the urine culture order for in/out cath to be done by HH is a standing order, good for 1 year for them to do with UTI symptoms. thanks

## 2022-05-11 NOTE — TELEPHONE ENCOUNTER
----- Message from Mayda Rodriguez MA sent at 5/11/2022  9:54 AM CDT -----  Type:  RX Refill Request    Who Called:  daughterdwain  Refill or New Rx:  refill  RX Name and Strength:    - ALPRAZolam (XANAX) 0.25 MG tablet  - novolog pen (sts uses this when sugars are high)  How is the patient currently taking it? (ex. 1XDay):  takes 2 daily (Xanax)  Is this a 30 day or 90 day RX:  30  Preferred Pharmacy with phone number:    [id number] - [place], [place] - [address] [address] AT [address] [address] [address]  [address] [address] [place] [address]  Phone: [phone] Fax: [phone]    Local or Mail Order:  local  Ordering Provider:  Nell Farfan Call Back Number:  [phone]  Additional Information:  please advise--thank you

## 2022-05-11 NOTE — TELEPHONE ENCOUNTER
Spoke with patient daughter, Drake. Drake states Ms. Mehta was suppose to send Xanax 0.25 mg BID to the pharmacy, but it has not been sent in. Requesting Novolog pen as pt was given this while in hospital but no longer as refills. Please advise.

## 2022-05-11 NOTE — TELEPHONE ENCOUNTER
Please let pt's son Drake Thorne sent with 2 refills. Please let him know I sent a portal message providing a insuline moderate dose sliding scale with directions for him to use. Thanks

## 2022-05-11 NOTE — TELEPHONE ENCOUNTER
Portal message sent 5/2/22 and still not read. Please call her son and discuss what I wrote. Thanks

## 2022-05-13 NOTE — TELEPHONE ENCOUNTER
Attempted to contact Maliha Jaramillo to discuss    .    Left voice mail to return our call at [phone] on [phone] (home).    Norma Nickerson LPN

## 2022-05-13 NOTE — TELEPHONE ENCOUNTER
Attempted to contact Maliha Jaramillo to discuss    .    Left voice mail to return our call at [phone] on [phone] (home).     Norma Nickerson LPN

## 2022-05-13 NOTE — TELEPHONE ENCOUNTER
Please see 5/10  message that still needs addressed. Also, I sent an insulin sliding scale via portal on 5/11 and that has not been read either. Please tell Drake I encourage having the portal jarrod on his phone for easy access and it will alert him he has a message from [facility]. Thanks

## 2022-05-14 ENCOUNTER — TELEPHONE (OUTPATIENT)
Dept: FAMILY MEDICINE | Facility: CLINIC | Age: 82
End: 2022-05-14
Payer: MEDICARE

## 2022-05-14 NOTE — TELEPHONE ENCOUNTER
Please call Son Charo. I sent an insulin sliding scale via portal on 5/11 and that has not been read and has multiple other message not read too. Please tell Drake I encourage having the portal jarrod on his phone for easy access and it will alert him he has a message from [facility]. Thanks    If you do not talk to Son please send back to me.

## 2022-05-16 NOTE — TELEPHONE ENCOUNTER
Thank you as we can try back. Can also call pt's cell yet ensure son is on speaker phone when message discussed. Please also send information to [facility] as [facility] are her [facility].  Thanks

## 2022-05-16 NOTE — TELEPHONE ENCOUNTER
Spoke with son and he has been following the sliding scale. Fax was sent to , and son verbalized understanding.

## 2022-05-23 NOTE — TELEPHONE ENCOUNTER
Informed pt that she can discuss any new areas of pain at her follow up appointment. Verbalizes understanding.

## 2022-05-23 NOTE — TELEPHONE ENCOUNTER
----- Message from Romy Barker sent at 5/23/2022 11:07 AM CDT -----  Contact: [phone]  Type: Needs Medical Advice  Who Called:  Pts daughter     Best Call Back Number: [phone]    Additional Information: RFA therapy worked. Pt would like to know when she could get her knees done. Also her neck and shoulders are hurting her.

## 2022-05-23 NOTE — TELEPHONE ENCOUNTER
----- Message from Brynn Franco sent at 5/23/2022 10:35 AM CDT -----  Regarding: Requesting a call back  Contact: Margoth -son  Name of Who is Calling: Margoth -son         What is the request in detail:  Pt daughter states she ran out of the  free style senor's and wants to know how to get  more.Please advise she is requesting a call back         Can the clinic reply by [facility]:No          What Number to Call Back [facility]: [phone]

## 2022-05-24 NOTE — TELEPHONE ENCOUNTER
----- Message from Shan Wilks sent at 5/24/2022  4:51 PM CDT -----  Contact: PT  Type: Needs Medical Advice    Who Called: PT/ daughter   Best Call Back Number: [phone]    Requesting a call back regarding - pt daughter is asking for a call back please she said the wrong RX was called in . Please call   Please Advise- Thank you

## 2022-05-26 NOTE — TELEPHONE ENCOUNTER
Pt's private areas are red and would like to know what should do about it.. states was suppose to order free styler SENSORS but received the reader instead

## 2022-05-26 NOTE — TELEPHONE ENCOUNTER
----- Message from Ramos Arreaga sent at 5/26/2022 10:04 AM CDT -----  Regarding: Call Back  Who Called: Drake- Son          What is the reason for the call: calling to let know her private areas are red and would like to know what should do about it.. states was suppose to order free styler sensors but received the reader instead.          Can patient be contacted on Jumiat:n/a         Call back number: [phone]

## 2022-05-30 NOTE — TELEPHONE ENCOUNTER
Please call [facility] and ask nursing if it looks fungal or skin irritation from being wet d/t wearing briefs. If red from wetness I recommend a barrier cream like Desitin or Aquaphor yet if fungal Ill send medication.    Sensors were sent 5/24/22 but if wrong please have pharmacy send me a request as I thought this was correct.  Thanks.

## 2022-06-02 NOTE — TELEPHONE ENCOUNTER
Hi, this was not completely addressed. I sent a message to Lulú Hernandez NP for [facility] and I will find out a better way on how to contact them.     Lulú SALMERON called me back. See 6/2/2022.       Please make this information available to our staff:  Latrice Cantu Director  Cell: [phone]  Office: [phone]  She said if no answer at office call her cell.     Thanks,

## 2022-06-03 NOTE — TELEPHONE ENCOUNTER
Hi, 5/26/22 encounter was not completely addressed. I sent a message to Lulú Hernandez NP for [facility] and I will find out a better way on how to contact them.     Lulú SALMERON called me back. Nursing did not report a rash yet her urine culture resulted again with yeast > 100,000 today. I am sending diflucan 200 mg (2 tabs) daily for 14 days to the pharmacy again. She was treated with the same regimen 3/9/22. Pt was seen by Urology [facility] 5/6/22 thus I will send NP a note.      I created a referral to Nephrology for [facility] Davis as I could not find an [facility] MD for their location. Please attach demographics, Renal US 3/9/22, urology note 5/6/22, A1c trends 4/1/22 CMP trends 5/5/22 and last office visit with me. Being Savi MD he should be able to access Care Everywhere, if he would have access to what I recommend be faxed than no need.     Please make this information available to our staff:  [facility] Brianda Jaimes,   Cell: [phone]  Office: [phone]  She said if no answer at office call her cell.     Thanks,

## 2022-06-13 NOTE — TELEPHONE ENCOUNTER
----- Message from Edith Ortega sent at 6/13/2022 12:21 PM CDT -----  Contact: Drake  Type:  RX Refill Request  Who Called: Pt daughter Drake   Refill or New Rx: refill  RX Name and Strength: rivaroxaban (XARELTO) 15 mg Tab  How is the patient currently taking it? (ex. 1XDay):    Is this a 30 day or 90 day RX:    Preferred Pharmacy with phone number:  [id number] - [place], [place] - [address] [address] AT [facility] [address] [facility]   Phone:  [phone]  Fax:  [phone]  Local or Mail Order:  Local  Ordering Provider:    [facility] Call Back Number:  [phone]  Additional Information: Pt son requesting refill on Rx   rivaroxaban (XARELTO) 15 mg Tab.

## 2022-06-14 NOTE — TELEPHONE ENCOUNTER
Please let pt's son know Np has not been filling xaralto yet Dr. Valdivia or oncology has. Does pt need to go to ER with confusion, SOB, swelling to left foot and low grade fever? Please call [facility] if unable to reach son and ask that they go check on her and obtain a urine culture. Thanks,

## 2022-06-17 NOTE — PATIENT INSTRUCTIONS
For your recurrent UTIs:  Behavioral changes to help decrease UTI recurrences   -increase water intake (6 to 8 bottles water per day)   -don't hold urine, void every 2 to 3 hours   -prevent constipation (miralax capful daily if necessary) to have a bowel movement daily and keep stools soft  -cut down on caffeine,drink mainly water  -no douching   -void immediately after sexual intercourse  -wipe front to back      OTC meds to try (go to the pharmacist and ask where they are, they are over the counter)  -Use lactobacillus probiotic in capsule form in vagina once nightly for 10 days if you feel like you have an infection coming on   -cranberry pills 500mg tabs TID, can buy over the counter  -take a probiotic daily  -D-Mannose once daily over the counter

## 2022-06-17 NOTE — ED TRIAGE NOTES
Pt sent by primary care for evaluation and admission for UTI. Family states that pt is usually oriented and aware of what is going on but has been confused for the last 5 days.  Pt pleasantly confused, resting comfortably in bed.  NAD noted.  Will continue to monitor.

## 2022-06-18 NOTE — PROGRESS NOTES
"Subjective:       Patient ID: Maliha Jaramillo is a 81 y.o. female.    Chief Complaint: Nasal Congestion (Pt states she is coughing up white phlem. Son, Charo reports pleasantly confused, weakness, clammy, low grade fever, diarrhea and not responding as normal. )  -  82 y/o female under the care of this provider x 6 yrs presents today with her son Charo as he is concerned about her current status as he was not comfortable with  nursing assessment and felt she needed to be seen. [facility] went to the [place] 6/13, 6/14 & 6/15 this week with nursing to tell the son "Patient's vital signs are good and she is okay".  Son, called this office on 6/13 with same above c/o yet NP did not see msg until 2000 yet our staff was able to contact HH at 0830 6/14 as urine culture and physical assessment was requested, I/O culture was done yet no other update provided. Urine culture received 6/17 (+) Morganella moganii only susceptible to IV ABX.    Son, reports pleasantly confused, weakness, clammy, low grade fever, diarrhea and not responding as normal x 5 days. Patient is evidently confused, clammy, slight jaundice appearance of face and evident dehydration. She does not look well at all, worst she has ever looked thus sent directly to the ER for admission and sepsis work up.  -    Past Medical History:   Diagnosis Date    Adenocarcinoma of left lung     Anxiety 1954    Arthritis 2013    Cancer     Adenocarcinoma of left lung    Diabetes mellitus     Diabetes mellitus, type 2     Hyperlipidemia 2012    Hypertension     Hypothyroidism (acquired) 2004    Osteoporosis 07/2019    Paroxysmal A-fib     Stage 3b chronic kidney disease     Stroke     Tachycardia     Urinary tract infection        Past Surgical History:   Procedure Laterality Date    APPENDECTOMY  1948    BREAST BIOPSY Right 1980's    benign    EPIDURAL STEROID INJECTION N/A 2/25/2021    Procedure: Injection, Steroid, Epidural L4-L5; "  Surgeon: Dereck Cervantes MD;  Location: [facility] OR;  Service: Pain Management;  Laterality: N/A;  oral    EPIDURAL STEROID INJECTION N/A 9/8/2021    Procedure: Injection, Steroid, Epidural;  Surgeon: Dereck Cervantes MD;  Location: [facility] OR;  Service: Pain Management;  Laterality: N/A;  L4-L5     EPIDURAL STEROID INJECTION N/A 9/15/2021    Procedure: Injection, Steroid, Epidural;  Surgeon: Dereck Cervantes MD;  Location: [facility] OR;  Service: Pain Management;  Laterality: N/A;  L4-L5     EPIDURAL STEROID INJECTION INTO LUMBAR SPINE N/A 11/16/2020    Procedure: Injection-steroid-epidural-lumbar;  Surgeon: Dereck Cervantes MD;  Location: [facility] OR;  Service: Pain Management;  Laterality: N/A;  L4-L5    ESOPHAGOGASTRODUODENOSCOPY N/A 6/25/2020    Procedure: ESOPHAGOGASTRODUODENOSCOPY (EGD);  Surgeon: Ruben Adame MD;  Location[facility];  Service: General;  Laterality: N/A;    EYE SURGERY      HYSTERECTOMY  1978    INJECTION OF ANESTHETIC AGENT AROUND MEDIAL BRANCH NERVES INNERVATING LUMBAR FACET JOINT Bilateral 4/18/2022    Procedure: Block-nerve-medial branch-lumbar L2.L3,L4,L5;  Surgeon: Dereck Cervantes MD;  Location: [facility] OR;  Service: Pain Management;  Laterality: Bilateral;    INJECTION OF ANESTHETIC AGENT AROUND NERVE Bilateral 3/30/2022    Procedure: Block, Nerve Medial Branch L2,L3,L4,L5;  Surgeon: Dereck Cervantes MD;  Location: [facility] OR;  Service: Pain Management;  Laterality: Bilateral;    INJECTION OF NERVE Bilateral 5/4/2022    Procedure: INJECTION, NERVE RFA L2-L3-L4-L5;  Surgeon: Dereck Cervantes MD;  Location: [facility] OR;  Service: Anesthesiology;  Laterality: Bilateral;    TONSILLECTOMY  1945    TOTAL REDUCTION MAMMOPLASTY Bilateral 1987    TUBAL LIGATION          Social History     Socioeconomic History    Marital status:    Tobacco Use    Smoking status: Former Smoker     Packs/day: 0.00     Years: 0.00     Pack years: 0.00    Smokeless tobacco: Never Used   Substance and Sexual  Activity    Alcohol use: Yes     Alcohol/week: 1.0 standard drink     Types: 1 Standard drinks or equivalent per week     Comment: Couple of drinks per month    Drug use: No    Sexual activity: Not Currently     Partners: Male     Birth control/protection: Condom, Post-menopausal, Spermicide       Family History   Problem Relation Age of Onset    Cancer Sister         Adenocarcinoma    Stroke Father        Review of patient's allergies indicates:   Allergen Reactions    Flu vaccine [id number](5 yr up) Anaphylaxis     Patient's left side of the face is severely swollen on the left side then became unresponsive.     Pneumococcal vaccine Anaphylaxis     Patient's left side of the face is severely swollen on the left side then became unresponsive. Had flu shot same day.     Ciprofloxacin Rash    Ace inhibitors Other (See Comments)     cough    Flu vaccine 2011-12(3 yr+)(pf)           Current Outpatient Medications:     ALPRAZolam (XANAX) 0.25 MG tablet, Take 1 tablet (0.25 mg total) by mouth 2 (two) times daily as needed for Insomnia or Anxiety (give 45 min prior to therapy appt)., Disp: 30 tablet, Rfl: 2    blood sugar diagnostic Strp, To check BG 2 times daily, to use with insurance preferred meter, Disp: 200 each, Rfl: 1    blood-glucose meter kit, To check BG 2 times daily, to use with insurance preferred meter, Disp: 1 each, Rfl: 3    carvediloL (COREG) 3.125 MG tablet, Take 1 tablet (3.125 mg total) by mouth 2 (two) times daily with meals., Disp: 180 tablet, Rfl: 0    diltiaZEM (CARDIZEM LA) 180 mg 24 hr tablet, Take 1 tablet (180 mg total) by mouth once daily., Disp: 90 tablet, Rfl: 1    dulaglutide (TRULICITY) 3 mg/0.5 mL pen injector, Inject 3 mg into the skin every 7 days., Disp: 4 pen, Rfl: 11    flash glucose scanning reader (FREESTYLE MAURICIO 14 DAY READER) Misc, 1 Device by Misc.(Non-Drug; Combo Route) route every 14 (fourteen) days. Freestyle Mauricio 2 sensor device, Disp: 2 each, Rfl: 5     "FREESTYLE MAURICIO 2 SENSOR Kit, USE TO CHECK BLOOD GLUCOSE DAILY, Disp: , Rfl:     furosemide (LASIX) 40 MG tablet, Take 1 tablet (40 mg total) by mouth once daily., Disp: 30 tablet, Rfl: 5    imipramine (TOFRANIL) 50 MG tablet, TAKE 1 TABLET BY MOUTH EVERY EVENING, Disp: 90 tablet, Rfl: 1    insulin aspart U-100 (NOVOLOG FLEXPEN U-100 INSULIN) 100 unit/mL (3 mL) InPn pen, Pt to use moderate dose sliding scale provided, before meals. Do not exceed 40 units a day., Disp: 4 each, Rfl: 5    insulin degludec (TRESIBA FLEXTOUCH U-100) 100 unit/mL (3 mL) insulin pen,  20 unit, SUBQ, Daily, 0 Refill(s), Maintenance, Disp: , Rfl:     lancets Misc, To check BG 2 times daily, to use with insurance preferred meter, Disp: 200 each, Rfl: 1    levothyroxine (SYNTHROID) 88 MCG tablet, Take 1 tablet (88 mcg total) by mouth before breakfast., Disp: 60 tablet, Rfl: 0    multivit-min/ferrous fumarate (MULTI VITAMIN ORAL),  1 tab, Oral, Daily, 0 Refill(s), Disp: , Rfl:     nitrofurantoin (MACRODANTIN) 50 MG capsule, Take 1 capsule (50 mg total) by mouth every evening., Disp: 90 capsule, Rfl: 1    nystatin (MYCOSTATIN) powder, Apply topically 4 (four) times daily., Disp: 60 g, Rfl: 2    OLANZapine (ZYPREXA) 2.5 MG tablet, Take 1 tablet (2.5 mg total) by mouth every evening., Disp: 30 tablet, Rfl: 5    paroxetine (PAXIL) 10 MG tablet, Take 1 tablet (10 mg total) by mouth every evening., Disp: 90 tablet, Rfl: 1    pen needle, diabetic (1ST TIER UNIFINE PENTIPS) 32 gauge x 5/32" Ndle, 1 Device by Misc.(Non-Drug; Combo Route) route every evening., Disp: 200 each, Rfl: 4    potassium chloride (MICRO-K) 10 MEQ CpSR, Take 1 capsule (10 mEq total) by mouth 2 (two) times daily., Disp: 180 capsule, Rfl: 1    rivaroxaban (XARELTO) 15 mg Tab, 15 mg., Disp: , Rfl:     SANTYL ointment, Apply topically., Disp: , Rfl:   No current facility-administered medications for this visit.    Facility-Administered Medications Ordered in Other " Visits:     lactated ringers infusion, , Intravenous, Once PRN, Dereck Cervantes MD    lactated ringers infusion, , Intravenous, Once PRN, Dereck Cervantes MD    lactated ringers infusion, , Intravenous, Once PRN, Dereck Cervantes MD    [facility]  Review of Systems   Unable to perform ROS: Acuity of condition       Objective:      Physical Exam  Vitals and nursing note reviewed. Exam conducted with a chaperone present (Son).   Constitutional:       Appearance: She is well-developed. She is ill-appearing and diaphoretic.      Comments: Very thin   HENT:      Head: Normocephalic.   Eyes:      General: No scleral icterus.     Conjunctiva/sclera: Conjunctivae normal.      Pupils: Pupils are equal, round, and reactive to light.   Neck:      Thyroid: No thyromegaly.   Cardiovascular:      Rate and Rhythm: Tachycardia present. Rhythm irregular.      Pulses: Normal pulses.      Heart sounds: Normal heart sounds. No murmur heard.  Pulmonary:      Effort: Pulmonary effort is normal. No respiratory distress.      Comments: Decreased in bilateral bases yet no distress noted.   Musculoskeletal:         General: No swelling. Normal range of motion.      Cervical back: Normal range of motion. No tenderness.      Right lower leg: No edema.      Left lower leg: No edema.   Skin:     Capillary Refill: Capillary refill takes 2 to 3 seconds.      Coloration: Skin is pale.      Findings: No rash.      Comments: Cool and clammy generalized. Mildly jaundiced in the face yet no scleral icterus. Moderate skin tenting of forearms noted and son educated as sign of dehydration   Neurological:      Mental Status: She is alert. She is disoriented.      Sensory: No sensory deficit.      Motor: Weakness present. No abnormal muscle tone.   Psychiatric:         Attention and Perception: She is inattentive.         Mood and Affect: Mood normal.         Speech: Speech is delayed.         Behavior: Behavior is slowed. Behavior is cooperative.          Assessment:       1. 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 Frequent UTI    3. Weakness generalized    4. Uncontrolled type 2 DM with peripheral circulatory disorder    5. Stage 3b chronic kidney disease    6. Paroxysmal SVT (supraventricular tachycardia)    7. Debilitated    8. Adenocarcinoma of lung, unspecified laterality    9. Diarrhea, unspecified type    10. Cold and clammy skin    11. Confusion associated with infection        Plan:     1- Patient discussed with Dr. Carballo, NP requesting direct admit yet advised to go to ER for sepsis work up first. Report called to Dr. Whitten and urine culture faxed to the ER.   2- Son instructed to f/u with this NP in the office with in 7 days after discharged from hospital, verbalized understanding.  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 All questions were answered to the fullest satisfaction of the patient, and pt verbalized understanding and agreement to treatment plan. Pt was to call with any new or worsening symptoms, or present to the ER.

## 2022-06-18 NOTE — ED PROVIDER NOTES
[facility] - Emergency Dept  6 Hours Sepsis Perfusion Exam   Provider Attestation    I attest, a sepsis perfusion exam was performed within 6 hours of Septic Shock presentation, following fluid resuscitation.    8:38 PM07/04/2022  David Lamb MD    Encounter Date: 6/17/2022       History     Chief Complaint   Patient presents with    Weakness     Pt.'s Son states the pt. Was evaluated by PCP and sent to the ER for evaluation due to UTI.  States pt. Has had confusion, weakness, decreased appetite, and fever since Monday.     81-year-old female presents the ED referred from private physician office, she apparently was brought by her son by wheelchair from the office upstairs with weakness, patient is able to give some simple answers however seems lethargic she is very thin and physically deconditioned with weakness most of the history is given by the son who is her caregiver he states that she has been treated recently repeatedly for urinary tract infection and recently had a urine culture done at [facility], after the patient was treated for apparent adenocarcinoma of the lungs surgically, last month she saw her oncology physician Dr. Hemphill and was told she had a clean bill of health        Review of patient's allergies indicates:   Allergen Reactions    Flu vaccine [id number](5 yr up) Anaphylaxis     Patient's left side of the face is severely swollen on the left side then became unresponsive.     Pneumococcal vaccine Anaphylaxis     Patient's left side of the face is severely swollen on the left side then became unresponsive. Had flu shot same day.     Ciprofloxacin Rash    Ace inhibitors Other (See Comments)     cough    Flu vaccine 2011-12(3 yr+)(pf)      Past Medical History:   Diagnosis Date    Adenocarcinoma of left lung     Anxiety 1954    Arthritis 2013    Cancer     Adenocarcinoma of left lung    Diabetes mellitus     Diabetes mellitus, type 2     Hyperlipidemia 2012     Hypertension     Hypothyroidism (acquired) 2004    Osteoporosis 07/2019    Paroxysmal A-fib     Stage 3b chronic kidney disease     Stroke     Tachycardia     Urinary tract infection      Past Surgical History:   Procedure Laterality Date    APPENDECTOMY  1948    BREAST BIOPSY Right 1980's    benign    EPIDURAL STEROID INJECTION N/A 2/25/2021    Procedure: Injection, Steroid, Epidural L4-L5;  Surgeon: Dereck Cervantes MD;  Location: [facility] OR;  Service: Pain Management;  Laterality: N/A;  oral    EPIDURAL STEROID INJECTION N/A 9/8/2021    Procedure: Injection, Steroid, Epidural;  Surgeon: Dereck Cervantes MD;  Location: [facility] OR;  Service: Pain Management;  Laterality: N/A;  L4-L5     EPIDURAL STEROID INJECTION N/A 9/15/2021    Procedure: Injection, Steroid, Epidural;  Surgeon: Dereck Cervantes MD;  Location: [facility] OR;  Service: Pain Management;  Laterality: N/A;  L4-L5     EPIDURAL STEROID INJECTION INTO LUMBAR SPINE N/A 11/16/2020    Procedure: Injection-steroid-epidural-lumbar;  Surgeon: Dereck Cervantes MD;  Location: [facility] OR;  Service: Pain Management;  Laterality: N/A;  L4-L5    ESOPHAGOGASTRODUODENOSCOPY N/A 6/25/2020    Procedure: ESOPHAGOGASTRODUODENOSCOPY (EGD);  Surgeon: Ruben Adame MD;  Location: [facility];  Service: General;  Laterality: N/A;    EYE SURGERY      HYSTERECTOMY  1978    INJECTION OF ANESTHETIC AGENT AROUND MEDIAL BRANCH NERVES INNERVATING LUMBAR FACET JOINT Bilateral 4/18/2022    Procedure: Block-nerve-medial branch-lumbar L2.L3,L4,L5;  Surgeon: Dereck Cervantes MD;  Location: [facility] OR;  Service: Pain Management;  Laterality: Bilateral;    INJECTION OF ANESTHETIC AGENT AROUND NERVE Bilateral 3/30/2022    Procedure: Block, Nerve Medial Branch L2,L3,L4,L5;  Surgeon: Dereck Cervantes MD;  Location: [facility] OR;  Service: Pain Management;  Laterality: Bilateral;    INJECTION OF NERVE Bilateral 5/4/2022    Procedure: INJECTION, NERVE RFA L2-L3-L4-L5;  Surgeon: Dereck Cervantes  MD;  Location: [facility] OR;  Service: Anesthesiology;  Laterality: Bilateral;    TONSILLECTOMY  1945    TOTAL REDUCTION MAMMOPLASTY Bilateral 1987    TUBAL LIGATION       Family History   Problem Relation Age of Onset    Cancer Sister         Adenocarcinoma    Stroke Father      Social History     Tobacco Use    Smoking status: Former Smoker     Packs/day: 0.00     Years: 0.00     Pack years: 0.00    Smokeless tobacco: Never Used   Substance Use Topics    Alcohol use: Yes     Alcohol/week: 1.0 standard drink     Types: 1 Standard drinks or equivalent per week     Comment: Couple of drinks per month    Drug use: No     Review of Systems   Constitutional: Positive for activity change, appetite change and fatigue. Negative for fever.   HENT: Negative for sore throat.    Respiratory: Negative for shortness of breath.    Cardiovascular: Negative for chest pain.   Gastrointestinal: Negative for nausea.   Genitourinary: Negative for dysuria.   Musculoskeletal: Negative for back pain.   Skin: Negative for rash.   Neurological: Negative for weakness.   Hematological: Does not bruise/bleed easily.       Physical Exam     Initial Vitals [06/17/22 1730]   BP Pulse Resp Temp SpO2   (!) 81/45 102 16 97.7 °F (36.5 °C) 97 %      MAP       --         Physical Exam    Nursing note and vitals reviewed.  Constitutional: She appears well-developed and well-nourished. She is not diaphoretic. No distress.   HENT:   Head: Normocephalic and atraumatic.   Poor skin turgor   Eyes: Pupils are equal, round, and reactive to light. Right eye exhibits no discharge. Left eye exhibits no discharge.   Neck: No tracheal deviation present. No JVD present.   Cardiovascular: Exam reveals no friction rub.    No murmur heard.  Pulmonary/Chest: No stridor. No respiratory distress. She has no wheezes. She has no rales.   Abdominal: Bowel sounds are normal. She exhibits no distension.   Musculoskeletal:         General: Normal range of motion.      Neurological: She is alert.   Skin: Skin is warm.   Psychiatric: She has a normal mood and affect.         ED Course   Procedures  Labs Reviewed   CULTURE, BLOOD - Abnormal; Notable for the following components:       Result Value    Blood Culture, Routine   (*)     Value: COAGULASE-NEGATIVE STAPHYLOCOCCUS SPECIES  Organism is a probable contaminant      All other components within normal limits    Narrative:     Aerobic and anaerobic   CULTURE, URINE - Abnormal; Notable for the following components:    Urine Culture, Routine   (*)     Value: MORGANELLA MORGANII  >100,000 cfu/ml      Urine Culture, Routine   (*)     Value: ENTEROCOCCUS FAECALIS  10,000 - 49,999 cfu/ml      All other components within normal limits    Narrative:     Preferred Collection Type->Urine, Clean Catch  Specimen Source->Urine   CBC W/ AUTO DIFFERENTIAL - Abnormal; Notable for the following components:    WBC 48.72 (*)     RBC 2.80 (*)     Hemoglobin 8.3 (*)     Hematocrit 24.8 (*)     RDW 14.6 (*)     Platelets 567 (*)     MPV 8.7 (*)     Gran % 81.0 (*)     Lymph % 4.0 (*)     Mono % 0.0 (*)     All other components within normal limits   COMPREHENSIVE METABOLIC PANEL - Abnormal; Notable for the following components:    Sodium 129 (*)     Chloride 94 (*)     CO2 16 (*)     Glucose 253 (*)     BUN 61 (*)     Creatinine 2.9 (*)     Albumin 2.4 (*)     Alkaline Phosphatase 248 (*)     AST 41 (*)     Anion Gap 19 (*)     eGFR if  16.6 (*)     eGFR if non  14.4 (*)     All other components within normal limits   URINALYSIS, REFLEX TO URINE CULTURE - Abnormal; Notable for the following components:    Protein, UA 1+ (*)     Ketones, UA Trace (*)     Bilirubin (UA) 1+ (*)     Occult Blood UA 2+ (*)     Leukocytes, UA 3+ (*)     All other components within normal limits    Narrative:     Preferred Collection Type->Urine, Clean Catch  Specimen Source->Urine   CBC W/ AUTO DIFFERENTIAL - Abnormal; Notable for the  following components:    WBC 32.57 (*)     RBC 2.27 (*)     Hemoglobin 6.7 (*)     Hematocrit 20.3 (*)     RDW 14.8 (*)     MPV 8.7 (*)     Gran % 85.0 (*)     Lymph % 6.0 (*)     Mono % 3.0 (*)     All other components within normal limits    Narrative:     Recoll. [id number] by [facility] at 06/17/2022 22:26, reason: Possible IV   fluid contamination. Giovanny Fernandes RN wants a   confirmation draw.   COMPREHENSIVE METABOLIC PANEL - Abnormal; Notable for the following components:    Sodium 129 (*)     Chloride 94 (*)     CO2 16 (*)     Glucose 256 (*)     BUN 61 (*)     Creatinine 2.9 (*)     Albumin 2.4 (*)     Alkaline Phosphatase 250 (*)     Anion Gap 19 (*)     eGFR if  16.6 (*)     eGFR if non  14.4 (*)     All other components within normal limits   URINALYSIS MICROSCOPIC - Abnormal; Notable for the following components:    RBC, UA 40 (*)     WBC, UA >100 (*)     Bacteria Many (*)     All other components within normal limits    Narrative:     Preferred Collection Type->Urine, Clean Catch  Specimen Source->Urine   SALICYLATE LEVEL - Abnormal; Notable for the following components:    Salicylate Lvl <5.0 (*)     All other components within normal limits   ACETAMINOPHEN LEVEL - Abnormal; Notable for the following components:    Acetaminophen (Tylenol), Serum <3.0 (*)     All other components within normal limits   DRUG SCREEN PANEL, URINE EMERGENCY - Abnormal; Notable for the following components:    Benzodiazepines Presumptive Positive (*)     All other components within normal limits    Narrative:     Preferred Collection Type->Urine, Clean Catch  Specimen Source->Urine   OCCULT BLOOD X 1, STOOL - Abnormal; Notable for the following components:    Occult Blood Positive (*)     All other components within normal limits   CBC W/ AUTO DIFFERENTIAL - Abnormal; Notable for the following components:    WBC 29.32 (*)     RBC 3.05 (*)     Hemoglobin 8.8 (*)     Hematocrit 27.4 (*)     MPV  8.9 (*)     Gran % 87.0 (*)     Lymph % 4.0 (*)     Mono % 1.0 (*)     All other components within normal limits   POCT GLUCOSE - Abnormal; Notable for the following components:    POCT Glucose 274 (*)     All other components within normal limits   ISTAT PROCEDURE - Abnormal; Notable for the following components:    POC PH 7.551 (*)     POC PCO2 22.6 (*)     POC PO2 132 (*)     POC HCO3 19.8 (*)     POC TCO2 20 (*)     All other components within normal limits   CULTURE, BLOOD    Narrative:     Aerobic and anaerobic   LACTIC ACID, PLASMA   DRUG SCREEN PANEL, URINE EMERGENCY   ALCOHOL,MEDICAL (ETHANOL)   SALICYLATE LEVEL   ACETAMINOPHEN LEVEL   ALCOHOL,MEDICAL (ETHANOL)   SARS-COV-2 RNA AMPLIFICATION, QUAL    Narrative:     Is the patient symptomatic?->No   LACTIC ACID, PLASMA   TYPE & SCREEN   PREPARE RBC SOFT   PREPARE RBC SOFT   PREPARE RBC SOFT     EKG Readings: (Independently Interpreted)   Initial Reading: No STEMI. Rhythm: Normal Sinus Rhythm. Heart Rate: 94. Ectopy: No Ectopy. Conduction: Normal. ST Segments: Non-Specific ST Segment Depression.     ECG Results          EKG 12-lead (Final result)  Result time 06/20/22 09:14:06    Final result by Interface, Lab In [facility] (06/20/22 09:14:06)                 Narrative:    Test Reason : R53.1,    Vent. Rate : 094 BPM     Atrial Rate : 094 BPM     P-R Int : 170 ms          QRS Dur : 090 ms      QT Int : 432 ms       P-R-T Axes : 058 041 025 degrees     QTc Int : 540 ms    Normal sinus rhythm  Low voltage QRS  Right bundle branch block  Nonspecific ST and T wave abnormality  Abnormal ECG  When compared with ECG of 11-MAR-2020 17:26,  No significant change was found  Confirmed by Jordan Diaz MD ([id number]) on 6/20/2022 9:13:52 AM    Referred By: [email or web address]   SELF           Confirmed By:Jordan Diaz MD                            Imaging Results          CT Abdomen Pelvis  Without Contrast (Final result)  Result time 06/18/22 06:30:48    Final result by Zuleyma Duran MD  (06/18/22 06:30:48)                 Impression:      There is circumferential bowel wall thickening involving the entirety of the colon thought to represent colitis.  There is a small amount of intra-abdominal fluid.    The gallbladder is dilated likely due to NPO status.      Electronically signed by: Zuleyma Duran MD  Date:    06/18/2022  Time:    06:30             Narrative:    EXAMINATION:  CT ABDOMEN PELVIS WITHOUT CONTRAST    CLINICAL HISTORY:  GI bleed;    TECHNIQUE:  Low dose axial images, sagittal and coronal reformations were obtained from the lung bases to the pubic symphysis.  30 mL of oral Omnipaque 350 was administered.    COMPARISON:  None    FINDINGS:  There is patchy opacification of bilateral lung bases.    There is a small amount of intra-abdominal free fluid.  The gallbladder is dilated.    There is circumferential bowel wall thickening involving the entirety of the colon.  There is decompression of loops of small bowel.  There is no evidence of abdominal nor pelvic lymphadenopathy.    There are degenerative changes of the spine.  There is grade 1 anterolisthesis of L4 with respect L5.                                CT Chest Without Contrast (Final result)  Result time 06/18/22 06:41:41    Final result by Zuleyma Duran MD (06/18/22 06:41:41)                 Impression:      There is patchy consolidation throughout the lung bases with a possible mass of the left lower lung zone.  Follow-up CT with contrast versus follow-up CT in 3-6 months time to assess for resolution of the consolidation is recommended.  This report was flagged in Epic as abnormal.      Electronically signed by: Zuleyma Duran MD  Date:    06/18/2022  Time:    06:41             Narrative:    EXAMINATION:  CT CHEST WITHOUT CONTRAST    CLINICAL HISTORY:  Sepsis;    TECHNIQUE:  Low dose axial images, sagittal and coronal reformations were obtained from the thoracic inlet to the lung bases. Contrast was not  administered.    COMPARISON:  None.    FINDINGS:  There is no evidence fracture or malalignment.  The adjacent soft tissues show consolidation throughout the pulmonary parenchyma most prominently at the lung bases.  There is a small right pleural effusion.    Visualized portions of the superior abdomen are unremarkable.  The osseous structures show degenerative change.                               CT Head Without Contrast (Final result)  Result time 06/18/22 06:42:21    Final result by Zuleyma Duran MD (06/18/22 06:42:21)                 Impression:      No acute abnormality.      Electronically signed by: Zuleyma Duran MD  Date:    06/18/2022  Time:    06:42             Narrative:    EXAMINATION:  CT HEAD WITHOUT CONTRAST    CLINICAL HISTORY:  Head trauma, moderate-severe;    TECHNIQUE:  Low dose axial CT images obtained throughout the head without intravenous contrast. Sagittal and coronal reconstructions were performed.    COMPARISON:  None.    FINDINGS:  Intracranial compartment:    Ventricles and sulci are normal in size for age without evidence of hydrocephalus. No extra-axial blood or fluid collections.  There is a small prior left frontal white matter infarct.    The brain parenchyma appears normal. No parenchymal mass, hemorrhage, edema or major vascular distribution infarct.    Skull/extracranial contents (limited evaluation): No fracture. Mastoid air cells and paranasal sinuses are essentially clear.                               X-Ray Hip 2 or 3 views Left (with Pelvis when performed) (Final result)  Result time 06/18/22 06:43:48    Final result by Zuleyma Duran MD (06/18/22 06:43:48)                 Impression:      There is osteopenia and degenerative change of both hips.      Electronically signed by: Zuleyma Duran MD  Date:    06/18/2022  Time:    06:43             Narrative:    EXAMINATION:  XR HIP WITH PELVIS WHEN PERFORMED, 2 OR 3 VIEWS LEFT    CLINICAL HISTORY:  Pain,  unspecified    TECHNIQUE:  AP view of the pelvis and frog leg lateral view of the left hip were performed.    COMPARISON:  11/03/2020    FINDINGS:  Both hips are located.  There is joint space narrowing with adjacent sclerosis.  The adjacent soft tissues are unremarkable.                               X-Ray Chest AP Portable (Final result)  Result time 06/18/22 06:44:17    Final result by Zuleyma Duran MD (06/18/22 06:44:17)                 Impression:      There is left basilar consolidation versus mass lesion.      Electronically signed by: Zuleyma Duran MD  Date:    06/18/2022  Time:    06:44             Narrative:    EXAMINATION:  XR CHEST AP PORTABLE    CLINICAL HISTORY:  Sepsis;    TECHNIQUE:  Single frontal view of the chest was performed.    COMPARISON:  None    FINDINGS:  There is left basilar consolidation versus mass lesion.    The cardiac silhouette is normal in size. The hilar and mediastinal contours are unremarkable.    Bones are intact.                                 Medications   lactated ringers bolus 1,185 mL (0 mL/kg × 39.5 kg Intravenous Stopped 6/17/22 1942)   piperacillin-tazobactam injection 3.375 g ( Intravenous Canceled Entry 6/17/22 2000)   sodium chloride 0.9% bolus 400 mL (0 mLs Intravenous Stopped 6/17/22 2310)   sodium chloride 0.9% bolus 500 mL (0 mLs Intravenous Stopped 6/18/22 0147)   The evaluation, management and treatment of this patient involved Critical Care services  amounting to 145  minutes of direct involvement.  This time was exclusive of any billable procedures.              ED Course as of 07/04/22 2337   Fri Jun 17, 2022   2151 Lactic acid, plasma #2 [WK]   2317 Through the regional referral center I have contacted her treating oncology group [facility] is on saturation diversion cannot accept the patient I have spoken with patient and her son and she indicates that she is a DNR however once her left lung tumor evaluated by Oncology  ""even if there is nothing that can be done", I have spoken with Dr.Kubehey oncology service at [facility] [WK]   2319 Patient's hypotension persists despite improved and her perfusion,  urine output and lactate  Considering placing central line and placing the patient on pressors unless improvement with transfusion (if heme pos stool) [WK]   2319 Blood transfusion is being readied, the stool is brown soft does not appear to be melanotic or hematochezia [WK]   Sat Jun 18, 2022   0316 MAP impro [WK]   0317 MAP improved, mentation and cap fill improved, pt has produced 350 cc concentrated urine [WK]      ED Course User Index  [WK] David Lamb MD    Sepsis Perfusion Assessment: "I attest a sepsis perfusion exam was performed within 6 hours of sepsis, severe sepsis, or septic shock presentation, following fluid resuscitation."        Clinical Impression:   Final diagnoses:  [R53.1] Weakness  [R52] Pain  [C34.32] Malignant neoplasm of lower lobe of left lung (Primary)  [N30.00] Acute cystitis without hematuria  [E86.0] Dehydration          ED Disposition Condition    Transfer to Another Facility Stable              David Lamb MD  06/18/22 0600       David Lamb MD  07/04/22 0319    "

## 2022-06-24 NOTE — TELEPHONE ENCOUNTER
Please call G and see if pt is still hospitalized? If so please fax urine and blood cultures from 6/17/22. She was septic and transferred taht day.  Thanks as I will call the son.

## 2022-07-01 NOTE — TELEPHONE ENCOUNTER
will be discharging from hospital sooner; patient would like to extend home health care order, [facility]

## 2022-07-01 NOTE — TELEPHONE ENCOUNTER
----- Message from Randi Bauman sent at 7/1/2022 12:50 PM CDT -----  Contact: son  Type: Patient Call Back         Who called: [place] [facility]         What is the request in detail:will be discharging from hospital sooner; patient would like to extend home health care order; requesting a call back        Best call back number: [phone]          Additional Information:  [facility]  Phone: [phone]         Thank You

## 2022-07-07 NOTE — TELEPHONE ENCOUNTER
----- Message from Maisha Obregon sent at 7/7/2022 10:29 AM CDT -----  Who Called: [facility] and home health    What is the reqeust in detail: Requesting call back to discuss removing catheter from her hospital discharge. Please advise.     Can the clinic reply by [facility]? No    Best Call Back Number: [phone]    Additional Information:

## 2022-07-12 NOTE — TELEPHONE ENCOUNTER
This was discussed with [facility] NP as I can not give order as I do not know why hospital left in at d/c.

## 2022-07-19 NOTE — PROGRESS NOTES
[facility] - Clinic Note    Subjective      Ms. Jaramillo is a 81 y.o. female who presents to clinic for a hospital follow up.     Patient is present today with her son who is her primary caretaker.   Was seen here in the ED 1 month ago and was transferred to [facility] for hypotension.   She was admitted to [facility] from June 16-30th and discharged to [facility] till 7/6/2022.  No paperwork brought in on visit.   Reports that she was treated for pneumonia, uti, and C. Diff.   She is currently on on any antibiotics.   Son reports that he feels patient has a UTI as she has been confused and not herself lately. Has to do a cath to get a sample.    [facility] [place] has a past medical history of Adenocarcinoma of left lung, Anxiety (1954), Arthritis (2013), Cancer, Diabetes mellitus, Diabetes mellitus, type 2, Hyperlipidemia (2012), Hypertension, Hypothyroidism (acquired) (2004), Osteoporosis (07/2019), Paroxysmal A-fib, Stage 3b chronic kidney disease, Stroke, Tachycardia, and Urinary tract infection.   [facility] [place] has a past surgical history that includes Hysterectomy (1978); Breast biopsy (Right, 1980's); Total Reduction Mammoplasty (Bilateral, 1987); Appendectomy (1948); Tonsillectomy (1945); Esophagogastroduodenoscopy (N/A, 6/25/2020); Epidural steroid injection into lumbar spine (N/A, 11/16/2020); Epidural steroid injection (N/A, 2/25/2021); Eye surgery; Tubal ligation; Epidural steroid injection (N/A, 9/8/2021); Epidural steroid injection (N/A, 9/15/2021); Injection of anesthetic agent around nerve (Bilateral, 3/30/2022); Injection of anesthetic agent around medial branch nerves innervating lumbar facet joint (Bilateral, 4/18/2022); and Injection of nerve (Bilateral, 5/4/2022).    [place]'s family history includes Cancer in her sister; Stroke in her father.    [place] reports that she has quit smoking. She smoked 0.00 packs per day for 0.00 years. She has never used smokeless tobacco. She reports current  alcohol use of about 1.0 standard drink of alcohol per week. She reports that she does not use drugs.   MARIA TERESA Jett is allergic to flu vaccine [id number](5 yr up), pneumococcal vaccine, ciprofloxacin, ace inhibitors, and flu vaccine 2011-12(3 yr+)(pf).   JOSE RAMON Jett has a current medication list which includes the following prescription(s): alprazolam, blood sugar diagnostic, blood-glucose meter, carvedilol, diltiazem, freestyle saroj 14 day reader, freestyle saroj 2 sensor, imipramine, insulin aspart u-100, tresiba flextouch u-100, lancets, levothyroxine, multivit-min/ferrous fumarate, nystatin, olanzapine, pantoprazole, paroxetine, pen needle, diabetic, potassium chloride, rivaroxaban, santyl, sodium bicarbonate, trulicity, and nitrofurantoin, and the following Facility-Administered Medications: lactated ringers, lactated ringers, and lactated ringers.     Review of Systems   Constitutional: Negative for activity change, appetite change, chills, fatigue and fever.   Eyes: Negative for visual disturbance.   Respiratory: Negative for cough and shortness of breath.    Cardiovascular: Negative for chest pain, palpitations and leg swelling.   Gastrointestinal: Negative for abdominal pain, nausea and vomiting.   Skin: Negative for wound.   Neurological: Negative for dizziness and headaches.   Psychiatric/Behavioral: Positive for confusion.     Objective     /76 (BP Location: Left arm, Patient Position: Sitting, BP Method: Small (Automatic))   Pulse 92   Resp 15   Ht 5' (1.524 m)   Wt 39.5 kg (87 lb 1.3 oz)   LMP  (LMP Unknown)   SpO2 95%   BMI 17.01 kg/m²     Physical Exam   Constitutional: She appears well-developed and well-nourished.  Non-toxic appearance. She does not appear ill. No distress.   HENT:   Head: Normocephalic and atraumatic.   Eyes: Right eye exhibits no discharge. Left eye exhibits no discharge.   Cardiovascular: Normal rate, regular rhythm, normal heart sounds and normal pulses. Exam  reveals no gallop and no friction rub.   No murmur heard.  Pulmonary/Chest: Effort normal and breath sounds normal. No respiratory distress. She has no wheezes. She has no rhonchi. She has no rales.   Abdominal: Normal appearance.   Musculoskeletal:      Cervical back: Neck supple.   Lymphadenopathy:     She has no cervical adenopathy.   Neurological: She is alert.   Skin: Skin is warm and dry. Capillary refill takes less than 2 seconds. She is not diaphoretic.   Psychiatric: Her behavior is normal. Mood, judgment and thought content normal.   Vitals reviewed.     Assessment/Plan     [place] was seen today for hospital follow up.    Diagnoses and all orders for this visit:  -New patient and new problem to me    Hospital discharge follow-up    Confusion  -     Urinalysis, Reflex to Urine Culture Urine, Catheterized; Future  -     SUBSEQUENT HOME HEALTH ORDERS    -[facility] historian of hospital course and care. Will obtain hospital discharge. Orders placed for  to obtain a urine sample.     -follow up with Janine (PCP) in 2 weeks.     Diego Tinoco MD  Family Medicine  [facility]

## 2022-07-28 NOTE — TELEPHONE ENCOUNTER
Maggy is calling to let office know pt has had diarrhea 3xtimes a day for past 3 days.  Vitals are normal and bowel sounds     Please advise.     Informed Maggy NP is not in clinic today but she will be will notified.       ----- Message from Romy Barker sent at 7/28/2022  2:25 PM CDT -----  Contact: [phone]  Type: Needs Medical Advice  Who Called:  Maggy from  [facility]    Best Call Back Number: [phone]  Additional Information: Maggy is calling to let office know pt has had diarrhea 3xtimes a day for past 3 days.  Vitals are normal and bowel sounds

## 2022-07-29 NOTE — TELEPHONE ENCOUNTER
Please fax c-dif order to [facility] for diarrhea. Recommend probiotics OTC per box instructions and ensure she stays hydrated or she will end up in the hospital. She can drink Pedialyte.  Thanks,

## 2022-08-10 PROBLEM — Z79.899 BENZODIAZEPINE CONTRACT EXISTS: Status: ACTIVE | Noted: 2022-01-01

## 2022-08-10 NOTE — PROGRESS NOTES
Subjective:       Patient ID: Maliha Jaramillo is a 81 y.o. female.    Chief Complaint: Follow-up (Pt is here for 2 week follow up)  -  82 y/o female presents for hospital follow up, was hospitalized c-dif, sepsis due to UTI discharged on 8/6/22. Is taking activa with pro-biotics.  The patient has chronic UTIs therefore will do an in and out catheterization today.  Will she continues with home health PT and does better when her son is not present.  Her glucose has been okay reports less than 150 in the morning due for labs today.  Continues to take Xanax b.i.d. for GA theD and has for many years.  Will obtain urine drug screen today yet  consistent and no red flags.  Continue to take your Xanax before you participate in physical therapy as it decreases her anxiety and she participates much better.  . -     Past Medical History:   Diagnosis Date    Adenocarcinoma of left lung     Anxiety 1954    Arthritis 2013    Cancer     Adenocarcinoma of left lung    Diabetes mellitus     Diabetes mellitus, type 2     Hyperlipidemia 2012    Hypertension     Hypothyroidism (acquired) 2004    Osteoporosis 07/2019    Paroxysmal A-fib     Stage 3b chronic kidney disease     Stroke     Tachycardia     Urinary tract infection        Past Surgical History:   Procedure Laterality Date    APPENDECTOMY  1948    BREAST BIOPSY Right 1980's    benign    EPIDURAL STEROID INJECTION N/A 2/25/2021    Procedure: Injection, Steroid, Epidural L4-L5;  Surgeon: Dereck Cervantes MD;  Location: [facility] OR;  Service: Pain Management;  Laterality: N/A;  oral    EPIDURAL STEROID INJECTION N/A 9/8/2021    Procedure: Injection, Steroid, Epidural;  Surgeon: Dereck Cervantes MD;  Location: [facility] OR;  Service: Pain Management;  Laterality: N/A;  L4-L5     EPIDURAL STEROID INJECTION N/A 9/15/2021    Procedure: Injection, Steroid, Epidural;  Surgeon: Dereck Cervantes MD;  Location: [facility] OR;  Service: Pain Management;  Laterality: N/A;  L4-L5      EPIDURAL STEROID INJECTION INTO LUMBAR SPINE N/A 11/16/2020    Procedure: Injection-steroid-epidural-lumbar;  Surgeon: Dereck Cervantes MD;  Location: [facility] OR;  Service: Pain Management;  Laterality: N/A;  L4-L5    ESOPHAGOGASTRODUODENOSCOPY N/A 6/25/2020    Procedure: ESOPHAGOGASTRODUODENOSCOPY (EGD);  Surgeon: Ruben Adame MD;  Location: [facility] ENDO;  Service: General;  Laterality: N/A;    EYE SURGERY      HYSTERECTOMY  1978    INJECTION OF ANESTHETIC AGENT AROUND MEDIAL BRANCH NERVES INNERVATING LUMBAR FACET JOINT Bilateral 4/18/2022    Procedure: Block-nerve-medial branch-lumbar L2.L3,L4,L5;  Surgeon: Dereck Cervantes MD;  Location: [facility] OR;  Service: Pain Management;  Laterality: Bilateral;    INJECTION OF ANESTHETIC AGENT AROUND NERVE Bilateral 3/30/2022    Procedure: Block, Nerve Medial Branch L2,L3,L4,L5;  Surgeon: Dereck Cervantes MD;  Location: [facility] OR;  Service: Pain Management;  Laterality: Bilateral;    INJECTION OF NERVE Bilateral 5/4/2022    Procedure: INJECTION, NERVE RFA L2-L3-L4-L5;  Surgeon: Dereck Cervantes MD;  Location: [facility] OR;  Service: Anesthesiology;  Laterality: Bilateral;    TONSILLECTOMY  1945    TOTAL REDUCTION MAMMOPLASTY Bilateral 1987    TUBAL LIGATION          Social History     Socioeconomic History    Marital status:    Tobacco Use    Smoking status: Former Smoker     Packs/day: 0.00     Years: 0.00     Pack years: 0.00    Smokeless tobacco: Never Used   Substance and Sexual Activity    Alcohol use: Yes     Alcohol/week: 1.0 standard drink     Types: 1 Standard drinks or equivalent per week     Comment: Couple of drinks per month    Drug use: No    Sexual activity: Not Currently     Partners: Male     Birth control/protection: Condom, Post-menopausal, Spermicide       Family History   Problem Relation Age of Onset    Cancer Sister         Adenocarcinoma    Stroke Father        Review of patient's allergies indicates:   Allergen Reactions    Flu  vaccine [id number](5 yr up) Anaphylaxis     Patient's left side of the face is severely swollen on the left side then became unresponsive.     Pneumococcal vaccine Anaphylaxis     Patient's left side of the face is severely swollen on the left side then became unresponsive. Had flu shot same day.     Ciprofloxacin Rash    Ace inhibitors Other (See Comments)     cough    Flu vaccine 2011-12(3 yr+)(pf)           Current Outpatient Medications:     blood sugar diagnostic Strp, To check BG 2 times daily, to use with insurance preferred meter, Disp: 200 each, Rfl: 1    blood-glucose meter kit, To check BG 2 times daily, to use with insurance preferred meter, Disp: 1 each, Rfl: 3    carvediloL (COREG) 3.125 MG tablet, Take 1 tablet (3.125 mg total) by mouth 2 (two) times daily with meals., Disp: 180 tablet, Rfl: 0    dulaglutide (TRULICITY) 3 mg/0.5 mL pen injector, Inject 3 mg into the skin every 7 days., Disp: , Rfl:     flash glucose scanning reader (FREESTYLE MAURICIO 14 DAY READER) Misc, 1 Device by Misc.(Non-Drug; Combo Route) route every 14 (fourteen) days. Freestyle Mauricio 2 sensor device, Disp: 2 each, Rfl: 5    FREESTYLE MAURICIO 2 SENSOR Kit, USE TO CHECK BLOOD GLUCOSE DAILY, Disp: , Rfl:     imipramine (TOFRANIL) 50 MG tablet, TAKE 1 TABLET BY MOUTH EVERY EVENING, Disp: 90 tablet, Rfl: 1    insulin degludec (TRESIBA FLEXTOUCH U-100) 100 unit/mL (3 mL) insulin pen, Inject 10 Units into the skin every morning., Disp: , Rfl:     lancets Misc, To check BG 2 times daily, to use with insurance preferred meter, Disp: 200 each, Rfl: 1    levothyroxine (SYNTHROID) 88 MCG tablet, Take 1 tablet (88 mcg total) by mouth before breakfast., Disp: 60 tablet, Rfl: 0    multivit-min/ferrous fumarate (MULTI VITAMIN ORAL),  1 tab, Oral, Daily, 0 Refill(s), Disp: , Rfl:     OLANZapine (ZYPREXA) 5 MG tablet, Take 5 mg by mouth every evening., Disp: , Rfl:     pantoprazole (PROTONIX) 40 MG tablet, Take 40 mg by mouth once  "daily., Disp: , Rfl:     paroxetine (PAXIL) 10 MG tablet, Take 1 tablet (10 mg total) by mouth every evening., Disp: 90 tablet, Rfl: 1    pen needle, diabetic (1ST TIER UNIFINE PENTIPS) 32 gauge x 5/32" Ndle, 1 Device by Misc.(Non-Drug; Combo Route) route every evening., Disp: 200 each, Rfl: 4    potassium chloride (MICRO-K) 10 MEQ CpSR, Take 1 capsule (10 mEq total) by mouth 2 (two) times daily., Disp: 180 capsule, Rfl: 1    sodium bicarbonate 650 MG tablet, Take 1,300 mg by mouth 3 (three) times daily., Disp: , Rfl:     ALPRAZolam (XANAX) 0.25 MG tablet, Take 1 tablet (0.25 mg total) by mouth 2 (two) times daily as needed for Insomnia or Anxiety (give 45 min prior to therapy appt)., Disp: 60 tablet, Rfl: 2    amoxicillin-clavulanate 500-125mg (AUGMENTIN) 500-125 mg Tab, Take 1 tablet (500 mg total) by mouth 2 (two) times daily. for 10 days, Disp: 20 tablet, Rfl: 0    diltiaZEM (CARDIZEM CD) 240 MG 24 hr capsule, Take 1 capsule (240 mg total) by mouth once daily., Disp: 90 capsule, Rfl: 1    insulin aspart U-100 (NOVOLOG FLEXPEN U-100 INSULIN) 100 unit/mL (3 mL) InPn pen, Pt to use moderate dose sliding scale provided, BEFORE MEALS. Do not exceed 40 units a day., Disp: 4 each, Rfl: 5    nitrofurantoin (MACRODANTIN) 50 MG capsule, Take 1 capsule (50 mg total) by mouth every evening., Disp: 90 capsule, Rfl: 1    nystatin (MYCOSTATIN) cream, Apply topically 2 (two) times daily., Disp: 30 g, Rfl: 1    rivaroxaban (XARELTO) 10 mg Tab, Take 1 tablet (10 mg total) by mouth every evening., Disp: 90 tablet, Rfl: 1    SANTYL ointment, Apply topically., Disp: , Rfl:   No current facility-administered medications for this visit.    Facility-Administered Medications Ordered in Other Visits:     lactated ringers infusion, , Intravenous, Once PRN, Dereck Cervantes MD    lactated ringers infusion, , Intravenous, Once Dereck LOPEZ MD    lactated ringers infusion, , Intravenous, Once Dereck LOPEZ, " MD    HPI  Review of Systems   Constitutional: Positive for fatigue.   HENT: Negative.    Eyes: Negative.    Respiratory: Negative.    Cardiovascular: Negative.    Gastrointestinal: Negative.    Endocrine: Negative.    Genitourinary: Positive for dysuria.   Musculoskeletal: Negative.    Skin: Negative.    Allergic/Immunologic: Negative.    Neurological: Positive for weakness.   Hematological: Negative.    Psychiatric/Behavioral: Negative.        Objective:      Physical Exam  Vitals and nursing note reviewed. Exam conducted with a chaperone present (Son).   Constitutional:       General: She is not in acute distress.     Appearance: She is well-developed. She is ill-appearing.   HENT:      Head: Normocephalic.   Eyes:      Conjunctiva/sclera: Conjunctivae normal.   Neck:      Thyroid: No thyromegaly.   Cardiovascular:      Rate and Rhythm: Normal rate and regular rhythm.      Heart sounds: Normal heart sounds. No murmur heard.  Pulmonary:      Effort: Pulmonary effort is normal.      Breath sounds: Normal breath sounds. No wheezing or rales.   Musculoskeletal:         General: Normal range of motion.      Cervical back: Normal range of motion.      Right lower leg: No edema.      Left lower leg: No edema.      Comments: Is in a wheelchair, unsteady gait and weakness noted   Skin:     General: Skin is warm and dry.   Neurological:      Mental Status: She is alert and oriented to person, place, and time. Mental status is at baseline.   Psychiatric:         Mood and Affect: Mood normal.         Behavior: Behavior normal.         Thought Content: Thought content normal.         Judgment: Judgment normal.         Assessment:       1. Hospital discharge follow-up    2. Chronic UTI    3. Weakness generalized    4. Uncontrolled type 2 DM with peripheral circulatory disorder    5. Paroxysmal SVT (supraventricular tachycardia)    6. ROSIBEL (generalized anxiety disorder)    7. Benzodiazepine contract exists    8. Primary insomnia     9. Essential hypertension    10. Hypothyroidism (acquired)        Plan:     1- finish sodium bicarb then stop  2. Resume Macrodantin every evening  3. Diflucan for vaginal rash   4. Labs and urine culture today  5. Xanax filled an urine drug screen today  6-RTC 6 weeks  ----------------------------  An in and out Italian catheter was done via sterile technique by the nurse practitioner assisted by Lulú linares MA.  Urine was very purulent.  Patient tolerated without complaints.  -  Hospital discharge follow-up  -     Urine culture    Chronic UTI  -     nitrofurantoin (MACRODANTIN) 50 MG capsule; Take 1 capsule (50 mg total) by mouth every evening.  Dispense: 90 capsule; Refill: 1  -     Urine culture  -     nystatin (MYCOSTATIN) cream; Apply topically 2 (two) times daily.  Dispense: 30 g; Refill: 1  -     fluconazole (DIFLUCAN) 150 MG Tab; Take 1 tablet (150 mg total) by mouth once daily. for 2 days  Dispense: 2 tablet; Refill: 0    Weakness generalized    Uncontrolled type 2 DM with peripheral circulatory disorder  -     insulin aspart U-100 (NOVOLOG FLEXPEN U-100 INSULIN) 100 unit/mL (3 mL) InPn pen; Pt to use moderate dose sliding scale provided, BEFORE MEALS. Do not exceed 40 units a day.  Dispense: 4 each; Refill: 5  -     Microalbumin/Creatinine Ratio, Urine; Future; Expected date: 08/11/2022  -     Hemoglobin A1C; Future; Expected date: 08/10/2022  -     Comprehensive Metabolic Panel; Future; Expected date: 08/10/2022  -     CBC Auto Differential; Future; Expected date: 08/10/2022  -     T4, Free; Future; Expected date: 08/10/2022  -     TSH; Future; Expected date: 08/10/2022  -     Lipid Panel; Future; Expected date: 08/10/2022    Paroxysmal SVT (supraventricular tachycardia)  -     rivaroxaban (XARELTO) 10 mg Tab; Take 1 tablet (10 mg total) by mouth every evening.  Dispense: 90 tablet; Refill: 1  -     diltiaZEM (CARDIZEM CD) 240 MG 24 hr capsule; Take 1 capsule (240 mg total) by mouth once daily.   Dispense: 90 capsule; Refill: 1    ROSIBEL (generalized anxiety disorder)  -     Pain Clinic Drug Screen  -     ALPRAZolam (XANAX) 0.25 MG tablet; Take 1 tablet (0.25 mg total) by mouth 2 (two) times daily as needed for Insomnia or Anxiety (give 45 min prior to therapy appt).  Dispense: 60 tablet; Refill: 2    Benzodiazepine contract exists  -     Pain Clinic Drug Screen  -     ALPRAZolam (XANAX) 0.25 MG tablet; Take 1 tablet (0.25 mg total) by mouth 2 (two) times daily as needed for Insomnia or Anxiety (give 45 min prior to therapy appt).  Dispense: 60 tablet; Refill: 2    Primary insomnia  -     ALPRAZolam (XANAX) 0.25 MG tablet; Take 1 tablet (0.25 mg total) by mouth 2 (two) times daily as needed for Insomnia or Anxiety (give 45 min prior to therapy appt).  Dispense: 60 tablet; Refill: 2    Essential hypertension  -     Microalbumin/Creatinine Ratio, Urine; Future; Expected date: 08/11/2022  -     Hemoglobin A1C; Future; Expected date: 08/10/2022  -     Comprehensive Metabolic Panel; Future; Expected date: 08/10/2022  -     CBC Auto Differential; Future; Expected date: 08/10/2022  -     T4, Free; Future; Expected date: 08/10/2022  -     TSH; Future; Expected date: 08/10/2022  -     Lipid Panel; Future; Expected date: 08/10/2022    Hypothyroidism (acquired)  -     Microalbumin/Creatinine Ratio, Urine; Future; Expected date: 08/11/2022  -     Hemoglobin A1C; Future; Expected date: 08/10/2022  -     Comprehensive Metabolic Panel; Future; Expected date: 08/10/2022  -     CBC Auto Differential; Future; Expected date: 08/10/2022  -     T4, Free; Future; Expected date: 08/10/2022  -     TSH; Future; Expected date: 08/10/2022  -     Lipid Panel; Future; Expected date: 08/10/2022        Risks, benefits, and side effects were discussed with the patient. All questions were answered to the fullest satisfaction of the patient, and pt verbalized understanding and agreement to treatment plan. Pt was to call with any new or  worsening symptoms, or present to the ER.

## 2022-08-21 PROBLEM — L60.0 INGROWN NAIL: Status: ACTIVE | Noted: 2022-01-01

## 2022-08-21 PROBLEM — E11.621 DIABETIC ULCER OF HEEL ASSOCIATED WITH TYPE 2 DIABETES MELLITUS: Status: ACTIVE | Noted: 2022-01-01

## 2022-08-21 PROBLEM — L97.409 DIABETIC ULCER OF HEEL ASSOCIATED WITH TYPE 2 DIABETES MELLITUS: Status: ACTIVE | Noted: 2022-01-01

## 2022-08-21 NOTE — PROGRESS NOTES
Subjective:       Patient ID: Maliha Jaramillo is a 81 y.o. female.    Chief Complaint: Follow-up, Nail Problem, and Diabetes Mellitus  Patient presents today for follow-up patient diabetic evaluation.  Patient states she has been a diabetic for 4 years however she has had neuropathy for about 8-9 years.  Patient relates a history of previous heel ulcer that subsequently resolved and healed in October of last year she states that she ambulates very little and is in a wheelchair most of the time.  Patient presents with her family member today.    Past Medical History:   Diagnosis Date    Adenocarcinoma of left lung     Anxiety 1954    Arthritis 2013    Cancer     Adenocarcinoma of left lung    Diabetes mellitus     Diabetes mellitus, type 2     Hyperlipidemia 2012    Hypertension     Hypothyroidism (acquired) 2004    Osteoporosis 07/2019    Paroxysmal A-fib     Stage 3b chronic kidney disease     Stroke     Tachycardia     Urinary tract infection      Past Surgical History:   Procedure Laterality Date    APPENDECTOMY  1948    BREAST BIOPSY Right 1980's    benign    EPIDURAL STEROID INJECTION N/A 2/25/2021    Procedure: Injection, Steroid, Epidural L4-L5;  Surgeon: Dereck Cervantes MD;  Location: [facility] OR;  Service: Pain Management;  Laterality: N/A;  oral    EPIDURAL STEROID INJECTION N/A 9/8/2021    Procedure: Injection, Steroid, Epidural;  Surgeon: Dereck Cervantes MD;  Location: [facility] OR;  Service: Pain Management;  Laterality: N/A;  L4-L5     EPIDURAL STEROID INJECTION N/A 9/15/2021    Procedure: Injection, Steroid, Epidural;  Surgeon: Dereck Cervantes MD;  Location: [facility] OR;  Service: Pain Management;  Laterality: N/A;  L4-L5     EPIDURAL STEROID INJECTION INTO LUMBAR SPINE N/A 11/16/2020    Procedure: Injection-steroid-epidural-lumbar;  Surgeon: Dereck Cervantes MD;  Location: [facility] OR;  Service: Pain Management;  Laterality: N/A;  L4-L5    ESOPHAGOGASTRODUODENOSCOPY N/A 6/25/2020     Procedure: ESOPHAGOGASTRODUODENOSCOPY (EGD);  Surgeon: Ruben Adame MD;  Location: [facility] ENDO;  Service: General;  Laterality: N/A;    EYE SURGERY      HYSTERECTOMY  1978    INJECTION OF ANESTHETIC AGENT AROUND MEDIAL BRANCH NERVES INNERVATING LUMBAR FACET JOINT Bilateral 4/18/2022    Procedure: Block-nerve-medial branch-lumbar L2.L3,L4,L5;  Surgeon: Dereck Cervantes MD;  Location: [facility] OR;  Service: Pain Management;  Laterality: Bilateral;    INJECTION OF ANESTHETIC AGENT AROUND NERVE Bilateral 3/30/2022    Procedure: Block, Nerve Medial Branch L2,L3,L4,L5;  Surgeon: Dereck Cervantes MD;  Location: [facility] OR;  Service: Pain Management;  Laterality: Bilateral;    INJECTION OF NERVE Bilateral 5/4/2022    Procedure: INJECTION, NERVE RFA L2-L3-L4-L5;  Surgeon: Dereck Cervantes MD;  Location: [facility] OR;  Service: Anesthesiology;  Laterality: Bilateral;    TONSILLECTOMY  1945    TOTAL REDUCTION MAMMOPLASTY Bilateral 1987    TUBAL LIGATION       Family History   Problem Relation Age of Onset    Cancer Sister         Adenocarcinoma    Stroke Father      Social History     Socioeconomic History    Marital status:    Tobacco Use    Smoking status: Former Smoker     Packs/day: 0.00     Years: 0.00     Pack years: 0.00    Smokeless tobacco: Never Used   Substance and Sexual Activity    Alcohol use: Yes     Alcohol/week: 1.0 standard drink     Types: 1 Standard drinks or equivalent per week     Comment: Couple of drinks per month    Drug use: No    Sexual activity: Not Currently     Partners: Male     Birth control/protection: Condom, Post-menopausal, Spermicide       Current Outpatient Medications   Medication Sig Dispense Refill    ALPRAZolam (XANAX) 0.25 MG tablet Take 1 tablet (0.25 mg total) by mouth 2 (two) times daily as needed for Insomnia or Anxiety (give 45 min prior to therapy appt). 60 tablet 2    amoxicillin-clavulanate 500-125mg (AUGMENTIN) 500-125 mg Tab Take 1 tablet (500 mg total) by  mouth 2 (two) times daily. for 10 days 20 tablet 0    blood sugar diagnostic Strp To check BG 2 times daily, to use with insurance preferred meter 200 each 1    blood-glucose meter kit To check BG 2 times daily, to use with insurance preferred meter 1 each 3    carvediloL (COREG) 3.125 MG tablet Take 1 tablet (3.125 mg total) by mouth 2 (two) times daily with meals. 180 tablet 0    diltiaZEM (CARDIZEM CD) 240 MG 24 hr capsule Take 1 capsule (240 mg total) by mouth once daily. 90 capsule 1    dulaglutide (TRULICITY) 3 mg/0.5 mL pen injector Inject 3 mg into the skin every 7 days.      flash glucose scanning reader (FREESTYLE MAURICIO 14 DAY READER) Misc 1 Device by Misc.(Non-Drug; Combo Route) route every 14 (fourteen) days. Freestyle Mauricio 2 sensor device 2 each 5    FREESTYLE MAURICIO 2 SENSOR Kit USE TO CHECK BLOOD GLUCOSE DAILY      imipramine (TOFRANIL) 50 MG tablet TAKE 1 TABLET BY MOUTH EVERY EVENING 90 tablet 1    insulin aspart U-100 (NOVOLOG FLEXPEN U-100 INSULIN) 100 unit/mL (3 mL) InPn pen Pt to use moderate dose sliding scale provided, BEFORE MEALS. Do not exceed 40 units a day. 4 each 5    insulin degludec (TRESIBA FLEXTOUCH U-100) 100 unit/mL (3 mL) insulin pen Inject 10 Units into the skin every morning.      lancets Misc To check BG 2 times daily, to use with insurance preferred meter 200 each 1    levothyroxine (SYNTHROID) 88 MCG tablet Take 1 tablet (88 mcg total) by mouth before breakfast. 60 tablet 0    multivit-min/ferrous fumarate (MULTI VITAMIN ORAL)   1 tab, Oral, Daily, 0 Refill(s)      nitrofurantoin (MACRODANTIN) 50 MG capsule Take 1 capsule (50 mg total) by mouth every evening. 90 capsule 1    nystatin (MYCOSTATIN) cream Apply topically 2 (two) times daily. 30 g 1    OLANZapine (ZYPREXA) 5 MG tablet Take 5 mg by mouth every evening.      pantoprazole (PROTONIX) 40 MG tablet Take 40 mg by mouth once daily.      paroxetine (PAXIL) 10 MG tablet Take 1 tablet (10 mg total) by  "mouth every evening. 90 tablet 1    pen needle, diabetic (1ST TIER UNIFINE PENTIPS) 32 gauge x 5/32" Ndle 1 Device by Misc.(Non-Drug; Combo Route) route every evening. 200 each 4    potassium chloride (MICRO-K) 10 MEQ CpSR Take 1 capsule (10 mEq total) by mouth 2 (two) times daily. 180 capsule 1    rivaroxaban (XARELTO) 10 mg Tab Take 1 tablet (10 mg total) by mouth every evening. 90 tablet 1    SANTYL ointment Apply topically.      sodium bicarbonate 650 MG tablet Take 1,300 mg by mouth 3 (three) times daily.       No current facility-administered medications for this visit.     Facility-Administered Medications Ordered in Other Visits   Medication Dose Route Frequency Provider Last Rate Last Admin    lactated ringers infusion   Intravenous Once PRN Dereck Cervantes MD        lactated ringers infusion   Intravenous Once PRN MD Shahana Jonesd ringers infusion   Intravenous Once PRN Dereck Cervantes MD         Review of patient's allergies indicates:   Allergen Reactions    Flu vaccine [id number](5 yr up) Anaphylaxis     Patient's left side of the face is severely swollen on the left side then became unresponsive.     Pneumococcal vaccine Anaphylaxis     Patient's left side of the face is severely swollen on the left side then became unresponsive. Had flu shot same day.     Ciprofloxacin Rash    Ace inhibitors Other (See Comments)     cough    Flu vaccine 2011-12(3 yr+)(pf)        Review of Systems   Musculoskeletal: Positive for arthralgias and gait problem.   Neurological: Positive for numbness.   All other systems reviewed and are negative.      Objective:      Vitals:    08/18/22 1418   BP: 121/69   Pulse: 86   Temp: 97.6 °F (36.4 °C)   Weight: 39 kg (86 lb)   Height: 5' (1.524 m)     Physical Exam  Vitals and nursing note reviewed.   Constitutional:       Appearance: Normal appearance.   Cardiovascular:      Pulses:           Dorsalis pedis pulses are 0 on the right side and 0 on " the left side.        Posterior tibial pulses are 0 on the right side and 0 on the left side.   Pulmonary:      Effort: Pulmonary effort is normal.   Musculoskeletal:         General: Deformity present.   Feet:      Right foot:      Protective Sensation: 4 sites tested. 1 site sensed.     Skin integrity: Ulcer, callus and dry skin present.      Toenail Condition: Right toenails are abnormally thick, long and ingrown. Fungal disease present.     Left foot:      Protective Sensation: 4 sites tested. 1 site sensed.     Skin integrity: Dry skin present.      Toenail Condition: Left toenails are abnormally thick, long and ingrown. Fungal disease present.  Skin:     Capillary Refill: Capillary refill takes more than 3 seconds.      Findings: Lesion present.   Neurological:      Mental Status: She is alert.      Sensory: Sensory deficit present.   Psychiatric:         Mood and Affect: Mood normal.         Behavior: Behavior normal.         Thought Content: Thought content normal.         Judgment: Judgment normal.                                                                      Assessment:       1. Type 2 diabetes mellitus with diabetic polyneuropathy, without long-term current use of insulin    2. Comprehensive diabetic foot examination, type 2 DM, encounter for    3. Diabetic ulcer of heel associated with type 2 diabetes mellitus, unspecified laterality, unspecified ulcer stage        Plan:       Patient presents today for follow-up patient diabetic evaluation.  Patient states she has been a diabetic for 4 years however she has had neuropathy for about 8-9 years.  Patient relates a history of previous heel ulcer that subsequently resolved and healed in October of last year she states that she ambulates very little and is in a wheelchair most of the time.  Patient presents with her family member today.  A comprehensive new patient diabetic evaluation was performed today patient does have obvious signs of peripheral  vascular disease pulses were nonpalpable bilateral skin temperature is cool to cold from the anterior tibia to the distal digits bilateral.  Patient has significant diabetic related neuropathy she states she had been on Lyrica but had an issue taking the Lyrica and was taking off of it about a year ago she is not sure that the Lyrica was really helping her very much either.  Patient indicated today that she had never tried gabapentin she is having a lot of nerve discomfort in both lower extremities I recommended starting her on a low dose of gabapentin 100 mg 3 times a day and see how well this controls the patient's nerve related symptoms.  Patient was advised we can always adjust the dose as necessary.  Patient states she does have a lot of discomfort in both lower extremities and feet related to her neuropathy and the constant numbness as well as swelling in her feet.  Patient also had several ingrowing nails today secondary to fungal involvement I did debride the nails the patient did not require a nail avulsion at this time and she indicated that they actually felt better once the ingrowing toenail was removed.  I do recommended follow-up in every 4-6 months unless the patient has any problems questions or concerns sooner I did advised the patient and the patient's family member they need to monitor her left heel very closely she does have heel protectors at home they need to make sure that they keep this padded protected this could easily ulcerate again as she does have dry callus and scar tissue over the area of previously noted wound.  Patient was advised to contact us with any problems questions or concerns prior to regularly scheduled appointment.  Patient was advised it would be helpful to keep her skin well moisturized and hydrated over the area of previously noted heel ulcer.  This note was created using [employer] voice recognition software that occasionally misinterpreted phrases or words.

## 2022-09-14 NOTE — DISCHARGE INSTRUCTIONS
[phone]  [facility]   [phone]  [facility]      [phone]    Managing nausea    Some people have an upset stomach after surgery. This is often because of anesthesia, pain, or pain medicine, or the stress of surgery. These tips will help you handle nausea and eat healthy foods as you get better. If you were on a special food plan before surgery, ask your healthcare provider if you should follow it while you get better. These tips may help:  Do not push yourself to eat. Your body will tell you when to eat and how much.  Start off with clear liquids and soup. They are easier to digest.  Next try semi-solid foods, such as mashed potatoes, applesauce, and gelatin, as you feel ready.  Slowly move to solid foods. Dont eat fatty, rich, or spicy foods at first.  Do not force yourself to have 3 large meals a day. Instead eat smaller amounts more often.  Take pain medicines with a small amount of solid food, such as crackers or toast, to avoid nausea.

## 2022-09-14 NOTE — H&P
FOLLOW UP NOTE:     CHIEF COMPLAINT: back pain    INTERVAL HISTORY OF PRESENT ILLNESS: Maliha Jaramillo is a 81 y.o. female who presents for bilateral S1, S2, and S3 lateral branch blocks. The patient denies of any significant changes in her health since her last appointment. The patient also denies of any changes in the character of her pain since her last appointment.     ROS:  Review of Systems   Constitutional:  Negative for chills and fever.   HENT:  Negative for sore throat.    Eyes:  Negative for visual disturbance.   Respiratory:  Negative for shortness of breath.    Cardiovascular:  Negative for chest pain.   Gastrointestinal:  Negative for nausea and vomiting.   Genitourinary:  Negative for difficulty urinating.   Musculoskeletal:  Positive for back pain.   Skin:  Negative for rash.   Allergic/Immunologic: Negative for immunocompromised state.   Neurological:  Negative for syncope.   Hematological:  Does not bruise/bleed easily.   Psychiatric/Behavioral:  Negative for suicidal ideas.       MEDICAL, SURGICAL, FAMILY, SOCIAL HX: reviewed    MEDICATIONS/ALLERGIES: reviewed    PHYSICAL EXAM:    VITALS: Vitals reviewed.   Vitals:    09/14/22 1027 09/14/22 1031   BP: (!) 140/79    Pulse: 90    Resp: 13    Temp:  98.4 °F (36.9 °C)   SpO2: 99%        Physical Exam  Vitals and nursing note reviewed.   Constitutional:       Appearance: Normal appearance. She is not toxic-appearing or diaphoretic.   HENT:      Head: Normocephalic and atraumatic.   Eyes:      General:         Right eye: No discharge.         Left eye: No discharge.      Extraocular Movements: Extraocular movements intact.      Conjunctiva/sclera: Conjunctivae normal.   Cardiovascular:      Rate and Rhythm: Normal rate.   Pulmonary:      Effort: Pulmonary effort is normal. No respiratory distress.      Breath sounds: Normal breath sounds.   Abdominal:      Palpations: Abdomen is soft.   Skin:     General: Skin is warm and dry.      Findings: No rash.    Neurological:      Mental Status: She is alert and oriented to person, place, and time.   Psychiatric:         Mood and Affect: Mood and affect normal.         Cognition and Memory: Memory normal.         Judgment: Judgment normal.        EXTREMITIES:    Gen: No cyanosis, edema, varicosities, or tenderness to palpation BLE   Skin: Warm, pink, dry, no rashes, no lesions BLE   Strength: 5/5 motor strength BLE   ROM: hips, knees and ankles without pain or instability.     NEUROLOGICAL:    Gen: No clonus or spasticity.   Gait: Normal without antalgic lean   DTR's: 2+ in bilateral patellar, and ankle   BABINSKI: Absent bilaterally  Sensory: Intact to light touch and proprioception BLE    ASSESSMENT: Maliha Jaramillo is a 81 y.o. female who presents for bilateral S1, S2, and S3 lateral branch blocks.     PLAN:  Proceed with bilateral S1, S2, and S3 lateral branch blocks as previously discussed.    This patient has been cleared for surgery in an ambulatory surgical facility    ASA 3,  Mallampatti Score 3  No history of anesthetic complications  Plan for RN IV sedation    Dereck Cervantes MD  Pain Management

## 2022-09-14 NOTE — DISCHARGE SUMMARY
[facility]  Discharge Note  Short Stay    Procedure(s) (LRB):  Block, Nerve S1-S2-S3 (Bilateral)    OUTCOME: Patient tolerated treatment/procedure well without complication and is now ready for discharge.    DISPOSITION: Home or Self Care    FINAL DIAGNOSIS:  Sacroiliac joint dysfunction    FOLLOWUP: In clinic    DISCHARGE INSTRUCTIONS:    Discharge Procedure Orders   Diet general     Call MD for:  temperature >100.4     Call MD for:  persistent nausea and vomiting     Call MD for:  severe uncontrolled pain     Call MD for:  difficulty breathing, headache or visual disturbances     Call MD for:  redness, tenderness, or signs of infection (pain, swelling, redness, odor or green/yellow discharge around incision site)     Call MD for:  hives     Call MD for:  persistent dizziness or light-headedness     Call MD for:  extreme fatigue        TIME SPENT ON DISCHARGE: 30 minutes

## 2022-09-14 NOTE — OP NOTE
PROCEDURE DATE: 9/14/2022    PROCEDURE:  Bilateral S1, S2, and S3 lateral branch nerve blocks    DIAGNOSIS:  Sacroiliac joint dysfunction    Post Op diagnosis: Same    PHYSICIAN: Dereck Cervantes MD    MEDICATIONS INJECTED: 0.5% bupivicaine, 0.5 ml at each level    SEDATION MEDICATIONS: RN IV sedation    LOCAL ANESTHETIC USED: 1% lidocaine, 1 mL at each level    ESTIMATED BLOOD LOSS:  none    COMPLICATIONS:  none    TECHNIQUE: A time out was taken to identify the patient, procedure and side of the procedure. The patient was placed in a prone position, then prepped and draped in the usual sterile fashion using ChloraPrep and sterile towels.  The levels were determined under fluoroscopic guidance and then marked.  A 25-gauge 3.5 inch needle was introduced to the anatomic location of the S1, S2, and S3 lateral branch nerves on the bilateral side. The above medication was then injected. The patient tolerated the procedure well.     The patient was monitored after the procedure. Patient was given pain diary to record pain levels at home.     If found to have greater than a 50% recovery and so will be scheduled for a radiofrequency ablation of the corresponding nerves.  Patient was given post procedure and discharge instructions to follow at home.  The patient was discharged in a stable condition.

## 2022-09-15 NOTE — TELEPHONE ENCOUNTER
----- Message from Lisbeth Rene sent at 9/15/2022  1:02 PM CDT -----  Contact: pt's son Drake  Type: Needs Medical Advice    Who Called:  pt's son Drake     Symptoms (please be specific):  called to say the surgery went well the patient is doing good       Best Call Back Number: [phone]    Additional Information: Please call back if you have any questions.

## 2022-09-22 NOTE — TELEPHONE ENCOUNTER
----- Message from Randi Bauman sent at 9/22/2022 12:51 PM CDT -----  Contact: Drake Abdul - [facility]  Type: Patient Call Back         Who called:  Drake Abdul - [facility]         What is the request in detail:pt was in [facility] and when they discharged her they didn't jot giver her orders for a nurse aid to wash her twice a week; [facility]; please advise         Best call back number: [phone]         Additional Information:           Thank You

## 2022-09-22 NOTE — TELEPHONE ENCOUNTER
Pt was at [facility] and when they discharged her they didn't not give her orders for an aid to wash her twice a week; needs orders sent to [facility]

## 2022-09-27 PROBLEM — N18.32 STAGE 3B CHRONIC KIDNEY DISEASE: Status: ACTIVE | Noted: 2022-01-01

## 2022-09-27 NOTE — PROGRESS NOTES
Subjective:       Patient ID: Maliha Jaramillo is a 81 y.o. female.    Chief Complaint: Follow-up (Pt is here for a follow up after being discharged from [facility] in [place] pt also states he needs an order for a home nurse aid she also states she is having cough and congestion)  -  82 y/o female presents for hospital follow up for an apparent stroke of unknown etiology then went to [facility] for 2-3 weeks, was in the hospital for 7 days. The son took her out of the facility early as they wanted her to stay there 6 mo but not allow her to continue her specialized care. She is scheduled to undergo a lung biopsy as something has returned on her imaging yet oncology feels it is scar tissue from radiation.  A1c at the hospital was 7.7%.  She is requesting HH be resume. The son is present. H/o vascular dementia doing well, zyprexia effective for prn agitation mostly at night. Heel wounds has resolved.   -  Past Medical History:   Diagnosis Date    Adenocarcinoma of left lung     Anxiety 1954    Arthritis 2013    Cancer     Adenocarcinoma of left lung    Diabetes mellitus     Diabetes mellitus, type 2     Hyperlipidemia 2012    Hypertension     Hypothyroidism (acquired) 2004    Osteoporosis 07/2019    Paroxysmal A-fib     Stage 3b chronic kidney disease     Stroke     Tachycardia     TIA (transient ischemic attack)     Urinary tract infection        Past Surgical History:   Procedure Laterality Date    APPENDECTOMY  1948    BREAST BIOPSY Right 1980's    benign    EPIDURAL STEROID INJECTION N/A 2/25/2021    Procedure: Injection, Steroid, Epidural L4-L5;  Surgeon: Dereck Cervantes MD;  Location: [facility] OR;  Service: Pain Management;  Laterality: N/A;  oral    EPIDURAL STEROID INJECTION N/A 9/8/2021    Procedure: Injection, Steroid, Epidural;  Surgeon: Dereck Cervantes MD;  Location: [facility] OR;  Service: Pain Management;  Laterality: N/A;  L4-L5     EPIDURAL STEROID INJECTION N/A 9/15/2021    Procedure:  Injection, Steroid, Epidural;  Surgeon: Dereck Cervantes MD;  Location: [facility] OR;  Service: Pain Management;  Laterality: N/A;  L4-L5     EPIDURAL STEROID INJECTION INTO LUMBAR SPINE N/A 11/16/2020    Procedure: Injection-steroid-epidural-lumbar;  Surgeon: Dereck Cervantes MD;  Location: [facility] OR;  Service: Pain Management;  Laterality: N/A;  L4-L5    ESOPHAGOGASTRODUODENOSCOPY N/A 6/25/2020    Procedure: ESOPHAGOGASTRODUODENOSCOPY (EGD);  Surgeon: Ruben Adame MD;  Location: [facility] ENDO;  Service: General;  Laterality: N/A;    EYE SURGERY      HYSTERECTOMY  1978    INJECTION OF ANESTHETIC AGENT AROUND MEDIAL BRANCH NERVES INNERVATING LUMBAR FACET JOINT Bilateral 4/18/2022    Procedure: Block-nerve-medial branch-lumbar L2.L3,L4,L5;  Surgeon: Dereck Cervantes MD;  Location: [facility] OR;  Service: Pain Management;  Laterality: Bilateral;    INJECTION OF ANESTHETIC AGENT AROUND NERVE Bilateral 3/30/2022    Procedure: Block, Nerve Medial Branch L2,L3,L4,L5;  Surgeon: Dereck Cervantes MD;  Location: [facility] OR;  Service: Pain Management;  Laterality: Bilateral;    INJECTION OF ANESTHETIC AGENT AROUND NERVE Bilateral 9/14/2022    Procedure: Block, Nerve S1-S2-S3;  Surgeon: Dereck Cervantes MD;  Location: [facility] OR;  Service: Pain Management;  Laterality: Bilateral;  Medicare    INJECTION OF NERVE Bilateral 5/4/2022    Procedure: INJECTION, NERVE RFA L2-L3-L4-L5;  Surgeon: Dereck Cervantes MD;  Location: [facility] OR;  Service: Anesthesiology;  Laterality: Bilateral;    TONSILLECTOMY  1945    TOTAL REDUCTION MAMMOPLASTY Bilateral 1987    TUBAL LIGATION          Social History     Socioeconomic History    Marital status:    Tobacco Use    Smoking status: Former     Packs/day: 0.00     Years: 0.00     Pack years: 0.00     Types: Cigarettes    Smokeless tobacco: Never   Substance and Sexual Activity    Alcohol use: Yes     Alcohol/week: 1.0 standard drink     Types: 1 Standard drinks or equivalent per week     Comment: Couple of  drinks per month    Drug use: No    Sexual activity: Not Currently     Partners: Male     Birth control/protection: Condom, Post-menopausal, Spermicide       Family History   Problem Relation Age of Onset    Cancer Sister         Adenocarcinoma    Stroke Father        Review of patient's allergies indicates:   Allergen Reactions    Flu vaccine [id number](5 yr up) Anaphylaxis     Patient's left side of the face is severely swollen on the left side then became unresponsive.     Pneumococcal vaccine Anaphylaxis     Patient's left side of the face is severely swollen on the left side then became unresponsive. Had flu shot same day.     Ciprofloxacin Rash    Ace inhibitors Other (See Comments)     cough    Flu vaccine 2011-12(3 yr+)(pf)           Current Outpatient Medications:     ALPRAZolam (XANAX) 0.25 MG tablet, Take 1 tablet (0.25 mg total) by mouth 2 (two) times daily as needed for Insomnia or Anxiety (give 45 min prior to therapy appt)., Disp: 60 tablet, Rfl: 2    blood sugar diagnostic Strp, To check BG 2 times daily, to use with insurance preferred meter, Disp: 200 each, Rfl: 1    blood-glucose meter kit, To check BG 2 times daily, to use with insurance preferred meter, Disp: 1 each, Rfl: 3    carvediloL (COREG) 3.125 MG tablet, Take 1 tablet (3.125 mg total) by mouth 2 (two) times daily with meals., Disp: 180 tablet, Rfl: 0    diltiaZEM (CARDIZEM CD) 240 MG 24 hr capsule, Take 1 capsule (240 mg total) by mouth once daily., Disp: 90 capsule, Rfl: 1    dulaglutide (TRULICITY) 3 mg/0.5 mL pen injector, Inject 3 mg into the skin every 7 days., Disp: , Rfl:     flash glucose scanning reader (FREESTYLE MAURICIO 14 DAY READER) Misc, 1 Device by Misc.(Non-Drug; Combo Route) route every 14 (fourteen) days. Freestyle Mauricio 2 sensor device, Disp: 2 each, Rfl: 5    FREESTYLE MAURICIO 2 SENSOR Kit, USE TO CHECK BLOOD GLUCOSE DAILY, Disp: , Rfl:     imipramine (TOFRANIL) 50 MG tablet, TAKE 1 TABLET BY MOUTH EVERY EVENING, Disp: 90  "tablet, Rfl: 1    insulin aspart U-100 (NOVOLOG FLEXPEN U-100 INSULIN) 100 unit/mL (3 mL) InPn pen, Pt to use moderate dose sliding scale provided, BEFORE MEALS. Do not exceed 40 units a day., Disp: 4 each, Rfl: 5    insulin degludec (TRESIBA FLEXTOUCH U-100) 100 unit/mL (3 mL) insulin pen, Inject 10 Units into the skin every morning., Disp: , Rfl:     lancets [facility], To check BG 2 times daily, to use with insurance preferred meter, Disp: 200 each, Rfl: 1    levothyroxine (SYNTHROID) 88 MCG tablet, Take 1 tablet (88 mcg total) by mouth before breakfast., Disp: 90 tablet, Rfl: 0    multivit-min/ferrous fumarate (MULTI VITAMIN ORAL),  1 tab, Oral, Daily, 0 Refill(s), Disp: , Rfl:     nitrofurantoin (MACRODANTIN) 50 MG capsule, TAKE 1 CAPSULE(50 MG) BY MOUTH EVERY EVENING, Disp: 90 capsule, Rfl: 1    nystatin (MYCOSTATIN) cream, Apply topically 2 (two) times daily., Disp: 30 g, Rfl: 1    OLANZapine (ZYPREXA) 5 MG tablet, Take 1 tablet (5 mg total) by mouth every evening., Disp: 90 tablet, Rfl: 1    pantoprazole (PROTONIX) 40 MG tablet, Take 40 mg by mouth once daily., Disp: , Rfl:     paroxetine (PAXIL) 10 MG tablet, Take 1 tablet (10 mg total) by mouth every evening., Disp: 90 tablet, Rfl: 1    pen needle, diabetic (1ST TIER UNIFINE PENTIPS) 32 gauge x 5/32" Ndle, 1 Device by Misc.(Non-Drug; Combo Route) route every evening., Disp: 200 each, Rfl: 4    potassium chloride (MICRO-K) 10 MEQ CpSR, Take 1 capsule (10 mEq total) by mouth 2 (two) times daily., Disp: 180 capsule, Rfl: 1    SANTYL ointment, Apply topically., Disp: , Rfl:     rivaroxaban (XARELTO) 10 mg Tab, Take 1 tablet (10 mg total) by mouth every evening., Disp: 90 tablet, Rfl: 1    sodium bicarbonate 650 MG tablet, Take 2 tablets (1,300 mg total) by mouth 3 (three) times daily., Disp: 90 tablet, Rfl: 1  No current facility-administered medications for this visit.    Facility-Administered Medications Ordered in Other Visits:     lactated ringers infusion, , " Intravenous, Once PRN, Dereck Cervantes MD    lactated ringers infusion, , Intravenous, Once PRN, Dereck Cervantes MD    lactated ringers infusion, , Intravenous, Once PRN, Dereck Cervantes MD    Follow-up  Associated symptoms include weakness.   Review of Systems   Constitutional: Negative.    HENT: Negative.     Eyes: Negative.    Respiratory: Negative.     Cardiovascular: Negative.    Gastrointestinal: Negative.    Endocrine: Negative.    Genitourinary: Negative.    Musculoskeletal: Negative.    Skin: Negative.    Allergic/Immunologic: Negative.    Neurological:  Positive for weakness.   Hematological: Negative.    Psychiatric/Behavioral: Negative.       Objective:      Physical Exam  Vitals and nursing note reviewed. Exam conducted with a chaperone present (Son).   Constitutional:       General: She is not in acute distress.     Appearance: Normal appearance. She is well-developed. She is not ill-appearing.      Comments: Thin and frail looking in a wheelchair   HENT:      Head: Normocephalic.   Eyes:      Conjunctiva/sclera: Conjunctivae normal.   Neck:      Thyroid: No thyromegaly.   Cardiovascular:      Rate and Rhythm: Regular rhythm. Tachycardia present.      Heart sounds: Normal heart sounds. No murmur heard.  Pulmonary:      Effort: Pulmonary effort is normal.      Breath sounds: Normal breath sounds. No wheezing or rales.   Musculoskeletal:         General: Normal range of motion.      Cervical back: Normal range of motion.      Right lower leg: No edema.      Left lower leg: No edema.   Skin:     General: Skin is warm and dry.   Neurological:      Mental Status: She is alert and oriented to person, place, and time. Mental status is at baseline.   Psychiatric:         Mood and Affect: Mood normal.         Behavior: Behavior normal.         Thought Content: Thought content normal.         Judgment: Judgment normal.       Assessment:       1. Hospital discharge follow-up    2. Chronic UTI    3.  Uncontrolled type 2 DM with peripheral circulatory disorder    4. Benzodiazepine contract exists    5. ROSIBEL (generalized anxiety disorder)    6. TIA (transient ischemic attack)    7. Weakness generalized    8. Cerebral infarction, unspecified mechanism    9. Paroxysmal SVT (supraventricular tachycardia)    10. Stage 3b chronic kidney disease    11. Dementia without behavioral disturbance    12. H/O: stroke    13. Mild vascular dementia with agitation        Plan:     1- Resume/refer HH  2- eye exam today  3-RTC for regular scheduled appt 11/14/22  4-Refer to nephrology for CKD  -    Hospital discharge follow-up  -     Ambulatory referral/consult to Home Health; Future; Expected date: 09/28/2022    Chronic UTI  -     Ambulatory referral/consult to Home Health; Future; Expected date: 09/28/2022    Uncontrolled type 2 DM with peripheral circulatory disorder  -     Ambulatory referral/consult to Home Health; Future; Expected date: 09/28/2022  -     Diabetic Eye Screening Photo; Future    Benzodiazepine contract exists  -     Ambulatory referral/consult to Home Health; Future; Expected date: 09/28/2022    ROSIBEL (generalized anxiety disorder)  -     Ambulatory referral/consult to Home Health; Future; Expected date: 09/28/2022    TIA (transient ischemic attack)  -     Ambulatory referral/consult to Home Health; Future; Expected date: 09/28/2022  -     rivaroxaban (XARELTO) 10 mg Tab; Take 1 tablet (10 mg total) by mouth every evening.  Dispense: 90 tablet; Refill: 1    Weakness generalized  -     Ambulatory referral/consult to Home Health; Future; Expected date: 09/28/2022    Cerebral infarction, unspecified mechanism  -     Ambulatory referral/consult to Home Health; Future; Expected date: 09/28/2022  -     rivaroxaban (XARELTO) 10 mg Tab; Take 1 tablet (10 mg total) by mouth every evening.  Dispense: 90 tablet; Refill: 1    Paroxysmal SVT (supraventricular tachycardia)  -     Ambulatory referral/consult to Home Health;  Future; Expected date: 09/28/2022  -     rivaroxaban (XARELTO) 10 mg Tab; Take 1 tablet (10 mg total) by mouth every evening.  Dispense: 90 tablet; Refill: 1    Stage 3b chronic kidney disease  -     sodium bicarbonate 650 MG tablet; Take 2 tablets (1,300 mg total) by mouth 3 (three) times daily.  Dispense: 90 tablet; Refill: 1  -     Ambulatory referral/consult to Nephrology; Future; Expected date: 10/04/2022    Dementia without behavioral disturbance    H/O: stroke    Mild vascular dementia with agitation      Risks, benefits, and side effects were discussed with the patient. All questions were answered to the fullest satisfaction of the patient, and pt verbalized understanding and agreement to treatment plan. Pt was to call with any new or worsening symptoms, or present to the ER.

## 2022-10-03 PROBLEM — Z86.73 H/O: STROKE: Status: ACTIVE | Noted: 2022-01-01

## 2022-10-03 PROBLEM — F01.A11 MILD VASCULAR DEMENTIA WITH AGITATION: Status: ACTIVE | Noted: 2022-01-01

## 2022-10-06 NOTE — TELEPHONE ENCOUNTER
----- Message from Gi Mansfield RN sent at 10/6/2022 11:13 AM CDT -----  Regarding: OPCM potential-HH recertification  The recertification of home health services for Maliha Jaramillo  has been reviewed by our Post-Acute UM team.  Please note the following:  Per [employer] guidelines, criteria has not been met for home health recertification due to no homebound status identified.  A potential need for OPCM has been identified. Please consider referring to OPCM for further assistance.          Respectfully,    Gi GRAMAJO,RN  Clinical Coordinator  [facility] Utilization [facility]

## 2022-10-06 NOTE — TELEPHONE ENCOUNTER
Gi Black RN-The recertification of home health services for Maliha Jaramillo  has been reviewed by our Post-Acute UM team.   Please note the following:   Per InterQual guidelines, criteria has not been met for home health recertification due to no homebound status identified.   A potential need for OPCM has been identified. Please consider referring to OPCM for further assistance.

## 2022-10-10 NOTE — TELEPHONE ENCOUNTER
I do not understand this message because pt is homebound thus information sent to Lulú Hernandez NP at [facility] for clarification.

## 2022-10-12 NOTE — TELEPHONE ENCOUNTER
Pt son stated that Ms Craig is in the [facility] and they need the instruction sheet for the procedure she is having next week. Obtained number for fax. Will send over.

## 2022-10-12 NOTE — TELEPHONE ENCOUNTER
----- Message from Alberto Nice sent at 10/12/2022  1:08 PM CDT -----  Type: Needs Medical Advice  Who Called:  Jannet Juan     Best Call Back Number: [phone]  Additional Information: Caller states that he would like a callback regarding needing the patient's procedure paperwork sent to [facility] in [place].

## 2022-10-13 NOTE — TELEPHONE ENCOUNTER
Informed Pt and son that when I checked the approvals for procedures next week her procedure has already been approved. They verbalized understanding and stated they will let the facility where she is know.

## 2022-10-13 NOTE — TELEPHONE ENCOUNTER
----- Message from Kelsie Dunn sent at 10/13/2022  3:20 PM CDT -----  Contact: PT  Type: Needs Medical Advice  Who Called:  PT   Best Call Back Number: [phone]  Additional Information: PT IS WANTING TO KNOW IF THE INJECTION WILL BE COVERED BY INSURANCE.

## 2022-11-02 NOTE — TELEPHONE ENCOUNTER
----- Message from Arcelia Turcios sent at 11/2/2022 11:34 AM CDT -----  Regarding: rescheduled procedure  Contact: Drake rock  Type:  Sooner Appointment Request    Caller is requesting a sooner appointment.  Caller declined first available appointment listed below.  Caller will not accept being placed on the waitlist and is requesting a message be sent to doctor.    Name of Caller:  Drake rock  When is the first available appointment?  11/03/22  Symptoms:  procedure  Best Call Back Number:  [phone]  Additional Information:  Patient is in rehab and needs to schedule two weeks out. Please call patient to advise.Thanks!

## 2022-11-30 NOTE — H&P
FOLLOW UP NOTE:     CHIEF COMPLAINT: back pain    INTERVAL HISTORY OF PRESENT ILLNESS: Maliha Jaramillo is a 81 y.o. female who presents for Bilateral S1, S2, and S3 lateral branch nerve blocks. The patient denies of any significant changes in her health since her last appointment. The patient also denies of any changes in the character of her pain since her last appointment.     ROS:  Review of Systems   Constitutional:  Negative for chills and fever.   HENT:  Negative for sore throat.    Eyes:  Negative for visual disturbance.   Respiratory:  Negative for shortness of breath.    Cardiovascular:  Negative for chest pain.   Gastrointestinal:  Negative for nausea and vomiting.   Genitourinary:  Negative for difficulty urinating.   Musculoskeletal:  Positive for arthralgias and back pain.   Skin:  Negative for rash.   Allergic/Immunologic: Negative for immunocompromised state.   Neurological:  Negative for syncope.   Hematological:  Does not bruise/bleed easily.   Psychiatric/Behavioral:  Negative for suicidal ideas.       MEDICAL, SURGICAL, FAMILY, SOCIAL HX: reviewed    MEDICATIONS/ALLERGIES: reviewed    PHYSICAL EXAM:    VITALS: Vitals reviewed. There were no vitals filed for this visit.    Physical Exam  Vitals and nursing note reviewed.   Constitutional:       Appearance: Normal appearance. She is not toxic-appearing or diaphoretic.   HENT:      Head: Normocephalic and atraumatic.   Eyes:      General:         Right eye: No discharge.         Left eye: No discharge.      Extraocular Movements: Extraocular movements intact.      Conjunctiva/sclera: Conjunctivae normal.   Cardiovascular:      Rate and Rhythm: Normal rate.   Pulmonary:      Effort: Pulmonary effort is normal. No respiratory distress.      Breath sounds: Normal breath sounds.   Abdominal:      Palpations: Abdomen is soft.   Skin:     General: Skin is warm and dry.      Findings: No rash.   Neurological:      Mental Status: She is alert and oriented  to person, place, and time.   Psychiatric:         Mood and Affect: Mood and affect normal.         Cognition and Memory: Memory normal.         Judgment: Judgment normal.        EXTREMITIES:    Gen: No cyanosis, edema, varicosities, or tenderness to palpation BLE   Skin: Warm, pink, dry, no rashes, no lesions BLE   Strength: 5/5 motor strength BLE   ROM: hips, knees and ankles without pain or instability.     NEUROLOGICAL:    Gen: No clonus or spasticity.   Gait: Normal without antalgic lean   DTR's: 2+ in bilateral patellar, and ankle   BABINSKI: Absent bilaterally  Sensory: Intact to light touch and proprioception BLE    ASSESSMENT: Maliha Jaramillo is a 81 y.o. female who presents for Bilateral S1, S2, and S3 lateral branch nerve blocks.     PLAN:  Proceed with Bilateral S1, S2, and S3 lateral branch nerve blocks as previously discussed.    This patient has been cleared for surgery in an ambulatory surgical facility    ASA 3,  Mallampatti Score 3  No history of anesthetic complications  Plan for local anesthesia    Dereck Cervantes MD  Pain Management

## 2022-11-30 NOTE — DISCHARGE SUMMARY
[facility]  Discharge Note  Short Stay    Procedure(s) (LRB):  Block, Nerve SI joint block (Bilateral)      OUTCOME: Patient tolerated treatment/procedure well without complication and is now ready for discharge.    DISPOSITION: Home or Self Care    FINAL DIAGNOSIS:  Sacroiliac joint dysfunction    FOLLOWUP: In clinic    DISCHARGE INSTRUCTIONS:    Discharge Procedure Orders   Diet general     Call MD for:  temperature >100.4     Call MD for:  persistent nausea and vomiting     Call MD for:  severe uncontrolled pain     Call MD for:  difficulty breathing, headache or visual disturbances     Call MD for:  redness, tenderness, or signs of infection (pain, swelling, redness, odor or green/yellow discharge around incision site)     Call MD for:  hives     Call MD for:  persistent dizziness or light-headedness     Call MD for:  extreme fatigue        TIME SPENT ON DISCHARGE: 30 minutes

## 2022-11-30 NOTE — OP NOTE
PROCEDURE DATE: 11/30/2022     PROCEDURE:  Bilateral S1, S2, and S3 lateral branch nerve blocks     DIAGNOSIS:  Sacroiliac joint dysfunction     Post Op diagnosis: Same     PHYSICIAN: Dereck Cervantes MD     MEDICATIONS INJECTED: 0.5% bupivicaine, 0.5 ml at each level     SEDATION MEDICATIONS: RN IV sedation     LOCAL ANESTHETIC USED: 1% lidocaine, 1 mL at each level     ESTIMATED BLOOD LOSS:  none     COMPLICATIONS:  none     TECHNIQUE: A time out was taken to identify the patient, procedure and side of the procedure. The patient was placed in a prone position, then prepped and draped in the usual sterile fashion using ChloraPrep and sterile towels.  The levels were determined under fluoroscopic guidance and then marked.  A 25-gauge 3.5 inch needle was introduced to the anatomic location of the S1, S2, and S3 lateral branch nerves on the bilateral side. The above medication was then injected. The patient tolerated the procedure well.     The patient was monitored after the procedure. Patient was given pain diary to record pain levels at home.      If found to have greater than a 50% recovery and so will be scheduled for a radiofrequency ablation of the corresponding nerves.  Patient was given post procedure and discharge instructions to follow at home.  The patient was discharged in a stable condition.

## 2022-12-09 NOTE — TELEPHONE ENCOUNTER
Talked with patient and son as NP was told by Cruz Hernandez NP that patient got a new PCP (Jammie Johnson NP) that is closer to home at [facility]. All care is received at [facility] thus encouraged to obtain PCP in [facility] system due to her oncologist and cardiologist are at [facility] and lung cancer has returned. One month of synthroid sent to pharmacy to allow time to call new PCP.

## 2023-01-01 ENCOUNTER — DOCUMENT SCAN (OUTPATIENT)
Dept: HOME HEALTH SERVICES | Facility: HOSPITAL | Age: 83
End: 2023-01-01
Payer: MEDICARE

## 2023-01-01 ENCOUNTER — TELEPHONE (OUTPATIENT)
Dept: FAMILY MEDICINE | Facility: CLINIC | Age: 83
End: 2023-01-01
Payer: MEDICARE

## 2023-05-16 NOTE — TELEPHONE ENCOUNTER
Patient passed away today of a stroke yet recurrent lung CA per Lulú Hernandez NP with [facility]. Condolences called to the son Drake. Has been under this providers care since 2015.

## 2023-05-17 ENCOUNTER — DOCUMENTATION ONLY (OUTPATIENT)
Dept: ADMINISTRATIVE | Facility: HOSPITAL | Age: 83
End: 2023-05-17
Payer: MEDICARE

## (undated) DEVICE — KIT NERVE BLOCK PREP BAPTIST

## (undated) DEVICE — STRAP OR TABLE 5IN X 72IN

## (undated) DEVICE — SYS LABEL CORRECT MED

## (undated) DEVICE — SYR 10CC LUER LOCK

## (undated) DEVICE — BANDAGE CURAD ADH FABRIC 1X3IN

## (undated) DEVICE — CANNULA RADIOPAQUE 20G 145MM

## (undated) DEVICE — APPLICATOR CHLORAPREP CLR 10.5

## (undated) DEVICE — GLOVE SURG ULTRA TOUCH 7.5

## (undated) DEVICE — SYR DISP LL 5CC

## (undated) DEVICE — NDL 10CC 20GAX1 COMBO

## (undated) DEVICE — BITE BLOCK ADULT LATEX FREE

## (undated) DEVICE — TOWEL OR DISP STRL BLUE 4/PK

## (undated) DEVICE — NDL HYPODERMIC BLUNT 18G 1.5IN

## (undated) DEVICE — CHLORAPREP 10.5 ML APPLICATOR

## (undated) DEVICE — NDL TUOHY EPIDURAL 20G X 3.5

## (undated) DEVICE — KIT ENDOKIT COMPLIANCE CUSTOM

## (undated) DEVICE — PAD GROUNDING DISPER ELECTRODE

## (undated) DEVICE — NDL SPINAL 25GX3.5 SPINOCAN

## (undated) DEVICE — NDL SAFETY 25G X 1.5 ECLIPSE

## (undated) DEVICE — GLOVE SURGEONS ULTRA TOUCH 6.5

## (undated) DEVICE — TUBING SUCTION 3/16X10 2 CONN

## (undated) DEVICE — SYR LUER SLIP GLASS 5ML

## (undated) DEVICE — SOL WATER STRL IRR 1000ML

## (undated) DEVICE — FORCEP BIOSY RJ 4 HOT 2.2X240

## (undated) DEVICE — KIT DEFENDO VLV  AIR WATER SUC

## (undated) DEVICE — BANDAID GARFIELD

## (undated) DEVICE — Device

## (undated) DEVICE — DILATOR CRE 18-20MM

## (undated) DEVICE — CANNULA SUPERSOFT CO2 7FT

## (undated) DEVICE — MARKER SKIN STND TIP BLUE BARR

## (undated) DEVICE — PAD PREPS ALCOHOL 2-PLY LARGE

## (undated) DEVICE — SYR GLASS 5CC LUER LOK

## (undated) DEVICE — GLOVE PI ULTRA TOUCH G SURGEON

## (undated) DEVICE — CANISTER SUCTION 3000CC

## (undated) DEVICE — CANNULA ETCO2 ADULT 7 O2/CO2

## (undated) DEVICE — TUBING MINIBORE EXTENSION

## (undated) DEVICE — SYR LUER-LOCK STERILE 3ML

## (undated) DEVICE — SEE MEDLINE ITEM 157116